# Patient Record
Sex: MALE | Race: WHITE | NOT HISPANIC OR LATINO | Employment: OTHER | URBAN - METROPOLITAN AREA
[De-identification: names, ages, dates, MRNs, and addresses within clinical notes are randomized per-mention and may not be internally consistent; named-entity substitution may affect disease eponyms.]

---

## 2017-04-05 RX ORDER — LEVOTHYROXINE SODIUM 0.05 MG/1
TABLET ORAL
Qty: 30 TABLET | Refills: 3 | Status: SHIPPED | OUTPATIENT
Start: 2017-04-05 | End: 2017-08-01 | Stop reason: SDUPTHER

## 2017-08-01 ENCOUNTER — OFFICE VISIT (OUTPATIENT)
Dept: INTERNAL MEDICINE | Facility: CLINIC | Age: 57
End: 2017-08-01

## 2017-08-01 VITALS
HEART RATE: 57 BPM | BODY MASS INDEX: 28.21 KG/M2 | OXYGEN SATURATION: 98 % | DIASTOLIC BLOOD PRESSURE: 62 MMHG | SYSTOLIC BLOOD PRESSURE: 98 MMHG | WEIGHT: 196.6 LBS

## 2017-08-01 DIAGNOSIS — K21.9 GASTROESOPHAGEAL REFLUX DISEASE, ESOPHAGITIS PRESENCE NOT SPECIFIED: ICD-10-CM

## 2017-08-01 DIAGNOSIS — E03.8 OTHER SPECIFIED HYPOTHYROIDISM: Primary | ICD-10-CM

## 2017-08-01 LAB
T4 FREE SERPL-MCNC: 0.96 NG/DL (ref 0.89–1.76)
TSH SERPL DL<=0.05 MIU/L-ACNC: 3.42 MIU/ML (ref 0.35–5.35)

## 2017-08-01 PROCEDURE — 84439 ASSAY OF FREE THYROXINE: CPT | Performed by: INTERNAL MEDICINE

## 2017-08-01 PROCEDURE — 84443 ASSAY THYROID STIM HORMONE: CPT | Performed by: INTERNAL MEDICINE

## 2017-08-01 PROCEDURE — 99213 OFFICE O/P EST LOW 20 MIN: CPT | Performed by: INTERNAL MEDICINE

## 2017-08-01 RX ORDER — LEVOTHYROXINE SODIUM 0.05 MG/1
50 TABLET ORAL DAILY
Qty: 30 TABLET | Refills: 0 | Status: SHIPPED | OUTPATIENT
Start: 2017-08-01 | End: 2018-01-31 | Stop reason: SDUPTHER

## 2017-08-01 NOTE — PROGRESS NOTES
Hypothyroidism    Subjective   Philippe Mixon is a 57 y.o. male is here today for follow-up.    History of Present Illness   Here for f/u on his hypothyroid nad gerd.  Doing well, denies sxs - wt gain, fatigue or hair loss.  Occ epigastric discomfort.  Takes meds prn only.      Current Outpatient Prescriptions:   •  levothyroxine (SYNTHROID, LEVOTHROID) 50 MCG tablet, Take 1 tablet by mouth Daily., Disp: 30 tablet, Rfl: 0      The following portions of the patient's history were reviewed and updated as appropriate: allergies, current medications, past family history, past medical history, past social history, past surgical history and problem list.    Review of Systems   Constitutional: Negative.  Negative for chills and fever.   HENT: Negative for ear discharge, ear pain, sinus pressure and sore throat.    Respiratory: Negative for cough, chest tightness and shortness of breath.    Cardiovascular: Negative for chest pain, palpitations and leg swelling.   Gastrointestinal: Positive for abdominal pain (epigastric occ.). Negative for diarrhea, nausea and vomiting.   Musculoskeletal: Negative for arthralgias, back pain and myalgias.   Neurological: Negative for dizziness, syncope and headaches.   Psychiatric/Behavioral: Negative for confusion and sleep disturbance.       Objective   BP 98/62  Pulse 57  Wt 196 lb 9.6 oz (89.2 kg)  SpO2 98% Comment: ra  BMI 28.21 kg/m2  Physical Exam   Constitutional: He is oriented to person, place, and time. He appears well-developed and well-nourished.   HENT:   Head: Normocephalic and atraumatic.   Mouth/Throat: No oropharyngeal exudate.   Eyes: Conjunctivae are normal. Pupils are equal, round, and reactive to light.   Neck: Neck supple. No thyromegaly present.   Cardiovascular: Normal rate and regular rhythm.    Pulmonary/Chest: Effort normal and breath sounds normal.   Abdominal: Soft. Bowel sounds are normal. He exhibits no distension. There is no tenderness.    Musculoskeletal: He exhibits no edema.   Neurological: He is alert and oriented to person, place, and time. No cranial nerve deficit.   Skin: Skin is warm and dry.   Psychiatric: He has a normal mood and affect. Judgment normal.   Nursing note and vitals reviewed.        Results for orders placed or performed in visit on 12/13/16   T4, Free   Result Value Ref Range    Free T4 1.12 0.89 - 1.76 ng/dL   TSH   Result Value Ref Range    TSH 4.845 0.350 - 5.350 mIU/mL   Comprehensive Metabolic Panel   Result Value Ref Range    Glucose 84 70 - 100 mg/dL    BUN 15 9 - 23 mg/dL    Creatinine 0.90 0.60 - 1.30 mg/dL    Sodium 139 132 - 146 mmol/L    Potassium 4.2 3.5 - 5.5 mmol/L    Chloride 102 99 - 109 mmol/L    CO2 32.0 (H) 20.0 - 31.0 mmol/L    Calcium 9.9 8.7 - 10.4 mg/dL    Total Protein 7.5 5.7 - 8.2 g/dL    Albumin 4.40 3.20 - 4.80 g/dL    ALT (SGPT) 24 7 - 40 U/L    AST (SGOT) 33 0 - 33 U/L    Alkaline Phosphatase 80 25 - 100 U/L    Total Bilirubin 0.5 0.3 - 1.2 mg/dL    eGFR Non African Amer 88 >60 mL/min/1.73    Globulin 3.1 gm/dL    A/G Ratio 1.4 g/dL    BUN/Creatinine Ratio 16.7 7.0 - 25.0    Anion Gap 5.0 3.0 - 11.0 mmol/L   Lipid Panel   Result Value Ref Range    Total Cholesterol 174 0 - 200 mg/dL    Triglycerides 98 0 - 150 mg/dL    HDL Cholesterol 60 40 - 60 mg/dL    LDL Cholesterol  60 0 - 130 mg/dL   Hepatitis C Antibody   Result Value Ref Range    Hepatitis C Ab Non-Reactive Non-Reactive   PSA   Result Value Ref Range    PSA 1.300 0.000 - 4.000 ng/mL   Vitamin D 25 Hydroxy   Result Value Ref Range    25 Hydroxy, Vitamin D 37.4 ng/ml   CBC (No Diff)   Result Value Ref Range    WBC 7.22 3.50 - 10.80 10*3/mm3    RBC 5.22 4.20 - 5.76 10*6/mm3    Hemoglobin 15.2 13.1 - 17.5 g/dL    Hematocrit 46.5 38.9 - 50.9 %    MCV 89.1 80.0 - 99.0 fL    MCH 29.1 27.0 - 31.0 pg    MCHC 32.7 32.0 - 36.0 g/dL    RDW 12.5 11.3 - 14.5 %    RDW-SD 40.2 37.0 - 54.0 fl    MPV 9.2 6.0 - 12.0 fL    Platelets 255 150 - 450 10*3/mm3    POC Urinalysis Dipstick, Automated   Result Value Ref Range    Color Yellow Yellow, Straw, Dark Yellow, Karena    Clarity, UA Clear Clear    Glucose, UA Negative Negative mg/dL    Bilirubin Negative Negative    Ketones, UA Negative Negative    Specific Gravity  1.015 1.005 - 1.030    Blood, UA Negative Negative    pH, Urine 7.0 5.0 - 8.0    Protein, POC Negative Negative mg/dL    Urobilinogen, UA Normal Normal    Leukocytes Negative Negative    Nitrite, UA Negative Negative             Assessment/Plan   Diagnoses and all orders for this visit:    Other specified hypothyroidism  -     levothyroxine (SYNTHROID, LEVOTHROID) 50 MCG tablet; Take 1 tablet by mouth Daily.  -     TSH  -     T4, Free    Gastroesophageal reflux disease, esophagitis presence not specified  Comments:  stable, continue current regimen.                 Return in about 6 months (around 2/1/2018) for Annual physical.

## 2017-10-01 RX ORDER — LEVOTHYROXINE SODIUM 0.05 MG/1
TABLET ORAL
Qty: 30 TABLET | Refills: 2 | Status: SHIPPED | OUTPATIENT
Start: 2017-10-01 | End: 2017-12-27 | Stop reason: SDUPTHER

## 2017-12-27 RX ORDER — LEVOTHYROXINE SODIUM 0.05 MG/1
TABLET ORAL
Qty: 30 TABLET | Refills: 1 | Status: SHIPPED | OUTPATIENT
Start: 2017-12-27 | End: 2018-01-31 | Stop reason: SDUPTHER

## 2018-01-31 ENCOUNTER — OFFICE VISIT (OUTPATIENT)
Dept: INTERNAL MEDICINE | Facility: CLINIC | Age: 58
End: 2018-01-31

## 2018-01-31 VITALS
HEART RATE: 69 BPM | SYSTOLIC BLOOD PRESSURE: 120 MMHG | WEIGHT: 204.3 LBS | BODY MASS INDEX: 29.25 KG/M2 | HEIGHT: 70 IN | OXYGEN SATURATION: 97 % | DIASTOLIC BLOOD PRESSURE: 80 MMHG

## 2018-01-31 DIAGNOSIS — E03.8 OTHER SPECIFIED HYPOTHYROIDISM: ICD-10-CM

## 2018-01-31 DIAGNOSIS — Z83.3 FAMILY HISTORY OF DIABETES MELLITUS (DM): ICD-10-CM

## 2018-01-31 DIAGNOSIS — N52.9 ERECTILE DYSFUNCTION, UNSPECIFIED ERECTILE DYSFUNCTION TYPE: ICD-10-CM

## 2018-01-31 DIAGNOSIS — B35.1 ONYCHOMYCOSIS: ICD-10-CM

## 2018-01-31 DIAGNOSIS — Z00.00 ANNUAL PHYSICAL EXAM: Primary | ICD-10-CM

## 2018-01-31 LAB
25(OH)D3 SERPL-MCNC: 32.4 NG/ML
ARTICHOKE IGE QN: 62 MG/DL (ref 0–130)
BILIRUB BLD-MCNC: NEGATIVE MG/DL
CHOLEST SERPL-MCNC: 157 MG/DL (ref 0–200)
CLARITY, POC: CLEAR
COLOR UR: YELLOW
DEPRECATED RDW RBC AUTO: 41.2 FL (ref 37–54)
ERYTHROCYTE [DISTWIDTH] IN BLOOD BY AUTOMATED COUNT: 12.8 % (ref 11.3–14.5)
GLUCOSE UR STRIP-MCNC: NEGATIVE MG/DL
HBA1C MFR BLD: 5.6 % (ref 4.8–5.6)
HCT VFR BLD AUTO: 46.7 % (ref 38.9–50.9)
HDLC SERPL-MCNC: 57 MG/DL (ref 40–60)
HGB BLD-MCNC: 15.7 G/DL (ref 13.1–17.5)
KETONES UR QL: NEGATIVE
LEUKOCYTE EST, POC: NEGATIVE
MCH RBC QN AUTO: 29.8 PG (ref 27–31)
MCHC RBC AUTO-ENTMCNC: 33.6 G/DL (ref 32–36)
MCV RBC AUTO: 88.6 FL (ref 80–99)
NITRITE UR-MCNC: NEGATIVE MG/ML
PH UR: 7 [PH] (ref 5–8)
PLATELET # BLD AUTO: 266 10*3/MM3 (ref 150–450)
PMV BLD AUTO: 9.6 FL (ref 6–12)
PROT UR STRIP-MCNC: NEGATIVE MG/DL
PSA SERPL-MCNC: 1.45 NG/ML (ref 0–4)
RBC # BLD AUTO: 5.27 10*6/MM3 (ref 4.2–5.76)
RBC # UR STRIP: NEGATIVE /UL
SP GR UR: 1.01 (ref 1–1.03)
T4 FREE SERPL-MCNC: 1.11 NG/DL (ref 0.89–1.76)
TRIGL SERPL-MCNC: 83 MG/DL (ref 0–150)
TSH SERPL DL<=0.05 MIU/L-ACNC: 5.64 MIU/ML (ref 0.35–5.35)
UROBILINOGEN UR QL: NORMAL
WBC NRBC COR # BLD: 7.26 10*3/MM3 (ref 3.5–10.8)

## 2018-01-31 PROCEDURE — 85027 COMPLETE CBC AUTOMATED: CPT | Performed by: INTERNAL MEDICINE

## 2018-01-31 PROCEDURE — 99396 PREV VISIT EST AGE 40-64: CPT | Performed by: INTERNAL MEDICINE

## 2018-01-31 PROCEDURE — 82270 OCCULT BLOOD FECES: CPT | Performed by: INTERNAL MEDICINE

## 2018-01-31 PROCEDURE — 84439 ASSAY OF FREE THYROXINE: CPT | Performed by: INTERNAL MEDICINE

## 2018-01-31 PROCEDURE — G0103 PSA SCREENING: HCPCS | Performed by: INTERNAL MEDICINE

## 2018-01-31 PROCEDURE — 84443 ASSAY THYROID STIM HORMONE: CPT | Performed by: INTERNAL MEDICINE

## 2018-01-31 PROCEDURE — 83036 HEMOGLOBIN GLYCOSYLATED A1C: CPT | Performed by: INTERNAL MEDICINE

## 2018-01-31 PROCEDURE — 81003 URINALYSIS AUTO W/O SCOPE: CPT | Performed by: INTERNAL MEDICINE

## 2018-01-31 PROCEDURE — 82306 VITAMIN D 25 HYDROXY: CPT | Performed by: INTERNAL MEDICINE

## 2018-01-31 PROCEDURE — 80061 LIPID PANEL: CPT | Performed by: INTERNAL MEDICINE

## 2018-01-31 RX ORDER — LEVOTHYROXINE SODIUM 0.05 MG/1
50 TABLET ORAL DAILY
Qty: 90 TABLET | Refills: 1 | Status: SHIPPED | OUTPATIENT
Start: 2018-01-31 | End: 2018-08-17 | Stop reason: SDUPTHER

## 2018-01-31 RX ORDER — SILDENAFIL 100 MG/1
100 TABLET, FILM COATED ORAL DAILY PRN
Qty: 10 TABLET | Refills: 2 | Status: SHIPPED | OUTPATIENT
Start: 2018-01-31 | End: 2018-08-17

## 2018-01-31 NOTE — PROGRESS NOTES
Chief Complaint   Patient presents with   • Annual Exam       History of Present Illness  HM, Adult Male: The patient is being seen for a health maintenance evaluation. The last health maintenance visit was many year(s) ago.   Social History: Household members include spouse. He is . Work status: working full time. The patient is a former cigarette smoker and quit smoking 1990. Appx 20 pack years.The patient has no concerns about alcohol abuse. He has never used illicit drugs.   General Health: The patient's health is described as fair. He has regular dental visits. He denies vision problems. He denies hearing loss. Immunizations status: not up to date.   Lifestyle:. He consumes a diverse and healthy diet. He does not have any weight concerns. He exercises regularly. Denies tobacco or alcohol use.  Risk: Uptodate on immunization.  Cancer risk ,Screening is UTD, updated.    Feeling very tired. Working 60 + hours , and shift change. 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  Currently getting 5-6 hrs of sleep. occ HA on the rt forehead        Review of Systems   Constitutional: Positive for fatigue. Negative for chills and fever.   HENT: Negative for ear discharge, ear pain, sinus pressure and sore throat.    Eyes: Negative for pain and redness.   Respiratory: Negative for cough, chest tightness and shortness of breath.    Cardiovascular: Negative for chest pain, palpitations and leg swelling.   Gastrointestinal: Positive for abdominal pain (epigastric ). Negative for diarrhea, nausea and vomiting.   Genitourinary: Negative for frequency and urgency.   Musculoskeletal: Negative for arthralgias, back pain and myalgias.   Skin: Negative for wound.   Neurological: Positive for headaches (rt sided). Negative for dizziness and syncope.   Psychiatric/Behavioral: Negative for confusion and sleep disturbance.       Patient Active Problem List   Diagnosis   • Esophageal reflux   • Hyperglycemia   • Hypothyroidism   •  "Vitamin D deficiency   • Duodenitis   • Dyslipidemia   • Family history of prostate cancer in father       Social History     Social History   • Marital status:      Spouse name: N/A   • Number of children: N/A   • Years of education: N/A     Occupational History   • Not on file.     Social History Main Topics   • Smoking status: Former Smoker   • Smokeless tobacco: Not on file   • Alcohol use No   • Drug use: Not on file   • Sexual activity: Not on file     Other Topics Concern   • Not on file     Social History Narrative       Current Outpatient Prescriptions on File Prior to Visit   Medication Sig Dispense Refill   • [DISCONTINUED] levothyroxine (SYNTHROID, LEVOTHROID) 50 MCG tablet Take 1 tablet by mouth Daily. 30 tablet 0   • [DISCONTINUED] levothyroxine (SYNTHROID, LEVOTHROID) 50 MCG tablet TAKE ONE TABLET BY MOUTH DAILY 30 tablet 1     No current facility-administered medications on file prior to visit.        No Known Allergies    /80 (BP Location: Left arm, Patient Position: Sitting, Cuff Size: Adult)  Pulse 69  Ht 177.8 cm (70\")  Wt 92.7 kg (204 lb 4.8 oz)  SpO2 97%  BMI 29.31 kg/m2         The following portions of the patient's history were reviewed and updated as appropriate: allergies, current medications, past family history, past medical history, past social history, past surgical history and problem list.    Physical Exam   Constitutional: He is oriented to person, place, and time.   HENT:   Head: Normocephalic and atraumatic.   Mouth/Throat: No oropharyngeal exudate.   Eyes: Conjunctivae and EOM are normal. Pupils are equal, round, and reactive to light. Right eye exhibits no discharge. Left eye exhibits no discharge. No scleral icterus.   Neck: Normal range of motion. Neck supple. No JVD present. No muscular tenderness present. Carotid bruit is not present. No rigidity. No tracheal deviation and normal range of motion present. No thyroid mass and no thyromegaly present. "   Cardiovascular: Normal rate, regular rhythm, S1 normal, S2 normal, normal heart sounds and intact distal pulses.    No murmur heard.  Pulmonary/Chest: Effort normal and breath sounds normal. No respiratory distress. He has no decreased breath sounds. He has no wheezes. He has no rhonchi. He has no rales.   Abdominal: Soft. Normal appearance and bowel sounds are normal. He exhibits no distension, no ascites and no mass. There is no hepatosplenomegaly. There is no tenderness. There is no rigidity, no rebound and no guarding. No hernia. Hernia confirmed negative in the right inguinal area and confirmed negative in the left inguinal area.   Genitourinary: Rectal exam shows no external hemorrhoid, no internal hemorrhoid, no mass and guaiac negative stool. Prostate is not enlarged and not tender.   Genitourinary Comments: Mildly enlarged prostate, no nodules.   Musculoskeletal:        Right shoulder: Normal.        Left shoulder: Normal.        Right hip: Normal.        Left hip: Normal.        Cervical back: He exhibits no tenderness and no swelling.        Thoracic back: He exhibits normal range of motion, no tenderness and no deformity.        Lumbar back: He exhibits normal range of motion, no tenderness and no deformity.       Vascular Status -  His exam exhibits right foot vasculature normal. His exam exhibits no right foot edema. His exam exhibits left foot vasculature normal. His exam exhibits no left foot edema.  Lymphadenopathy:     He has no cervical adenopathy.     He has no axillary adenopathy. No inguinal adenopathy noted on the right or left side.        Right: No inguinal adenopathy present.        Left: No inguinal adenopathy present.   Neurological: He is alert and oriented to person, place, and time. He has normal strength and normal reflexes. He is not disoriented. No cranial nerve deficit or sensory deficit. He displays no Babinski's sign on the right side. He displays no Babinski's sign on the left  side.   Skin: No bruising, no ecchymosis and no rash noted. He is not diaphoretic.       Results for orders placed or performed in visit on 01/31/18   POC Urinalysis Dipstick, Automated   Result Value Ref Range    Color Yellow Yellow, Straw, Dark Yellow, Karena    Clarity, UA Clear Clear    Glucose, UA Negative Negative, 1000 mg/dL (3+) mg/dL    Bilirubin Negative Negative    Ketones, UA Negative Negative    Specific Gravity  1.010 1.005 - 1.030    Blood, UA Negative Negative    pH, Urine 7.0 5.0 - 8.0    Protein, POC Negative Negative mg/dL    Urobilinogen, UA Normal Normal    Leukocytes Negative Negative    Nitrite, UA Negative Negative       Philippe was seen today for annual exam.    Diagnoses and all orders for this visit:    Annual physical exam  -     POC Urinalysis Dipstick, Automated  -     CBC (No Diff)  -     Hemoglobin A1c  -     Lipid Panel  -     TSH  -     T4, Free  -     Vitamin D 25 Hydroxy  -     PSA Screen    Family history of diabetes mellitus (DM)  -     Hemoglobin A1c    Other specified hypothyroidism  -     levothyroxine (SYNTHROID, LEVOTHROID) 50 MCG tablet; Take 1 tablet by mouth Daily.    Erectile dysfunction, unspecified erectile dysfunction type  -     sildenafil (VIAGRA) 100 MG tablet; Take 1 tablet by mouth Daily As Needed for erectile dysfunction.    Onychomycosis  -     ciclopirox (PENLAC) 8 % solution; Apply  topically Every Night.        Health Maintenance   Topic Date Due   • COLONOSCOPY  05/23/2021   • TDAP/TD VACCINES (2 - Td) 09/14/2025   • HEPATITIS C SCREENING  Completed   • INFLUENZA VACCINE  Addressed       Discussion/Summary  Impression: health maintenance visit, healthy adult male.   Currently, he eats a healthy diet and has an adequate exercise regimen.   Prostate cancer screening: PSA was ordered.   Testicular cancer screening: monthly self testicular exam was advised.   Colorectal cancer screening: fecal occult blood testing is needed every year and colonoscopy .    Screening lab work includes glucose, lipid profile and 25-hydroxyvitamin D.   The immunizations are up to date and Adv. to come in for Zostavax, if insurance covers it.   Advice and education were given regarding cardiovascular risk reduction and self skin examination.   Patient discussion: discussed with the patient.     Return in about 7 months (around 8/31/2018) for Next scheduled follow up.

## 2018-08-15 DIAGNOSIS — E03.8 OTHER SPECIFIED HYPOTHYROIDISM: ICD-10-CM

## 2018-08-16 RX ORDER — LEVOTHYROXINE SODIUM 0.05 MG/1
TABLET ORAL
Qty: 90 TABLET | Refills: 0 | OUTPATIENT
Start: 2018-08-16

## 2018-08-17 ENCOUNTER — OFFICE VISIT (OUTPATIENT)
Dept: INTERNAL MEDICINE | Facility: CLINIC | Age: 58
End: 2018-08-17

## 2018-08-17 VITALS
DIASTOLIC BLOOD PRESSURE: 80 MMHG | BODY MASS INDEX: 29.04 KG/M2 | SYSTOLIC BLOOD PRESSURE: 102 MMHG | HEART RATE: 78 BPM | OXYGEN SATURATION: 98 % | WEIGHT: 202.4 LBS

## 2018-08-17 DIAGNOSIS — E78.5 DYSLIPIDEMIA: ICD-10-CM

## 2018-08-17 DIAGNOSIS — E03.8 OTHER SPECIFIED HYPOTHYROIDISM: Primary | ICD-10-CM

## 2018-08-17 DIAGNOSIS — N52.9 ERECTILE DYSFUNCTION, UNSPECIFIED ERECTILE DYSFUNCTION TYPE: ICD-10-CM

## 2018-08-17 DIAGNOSIS — R73.9 HYPERGLYCEMIA: ICD-10-CM

## 2018-08-17 DIAGNOSIS — E53.8 B12 DEFICIENCY: ICD-10-CM

## 2018-08-17 LAB
HBA1C MFR BLD: 5.7 % (ref 4.8–5.6)
T4 FREE SERPL-MCNC: 1.11 NG/DL (ref 0.89–1.76)
TSH SERPL DL<=0.05 MIU/L-ACNC: 2.81 MIU/ML (ref 0.35–5.35)
VIT B12 BLD-MCNC: 493 PG/ML (ref 211–911)

## 2018-08-17 PROCEDURE — 82607 VITAMIN B-12: CPT | Performed by: INTERNAL MEDICINE

## 2018-08-17 PROCEDURE — 86376 MICROSOMAL ANTIBODY EACH: CPT | Performed by: INTERNAL MEDICINE

## 2018-08-17 PROCEDURE — 84439 ASSAY OF FREE THYROXINE: CPT | Performed by: INTERNAL MEDICINE

## 2018-08-17 PROCEDURE — 99214 OFFICE O/P EST MOD 30 MIN: CPT | Performed by: INTERNAL MEDICINE

## 2018-08-17 PROCEDURE — 84443 ASSAY THYROID STIM HORMONE: CPT | Performed by: INTERNAL MEDICINE

## 2018-08-17 PROCEDURE — 83036 HEMOGLOBIN GLYCOSYLATED A1C: CPT | Performed by: INTERNAL MEDICINE

## 2018-08-17 RX ORDER — LEVOTHYROXINE SODIUM 0.05 MG/1
50 TABLET ORAL DAILY
Qty: 90 TABLET | Refills: 1 | Status: SHIPPED | OUTPATIENT
Start: 2018-08-17 | End: 2018-08-20 | Stop reason: SDUPTHER

## 2018-08-17 RX ORDER — SILDENAFIL CITRATE 20 MG/1
TABLET ORAL
Qty: 30 TABLET | Refills: 5 | Status: SHIPPED | OUTPATIENT
Start: 2018-08-17 | End: 2019-02-22 | Stop reason: SDUPTHER

## 2018-08-17 NOTE — PROGRESS NOTES
Hypothyroidism (Follow up) and Hyperglycemia    Subjective   Philippe Mixon is a 58 y.o. male is here today for follow-up.    History of Present Illness   Philippe is here for a follow up on the hypothyroid, ifg, would like b12 checked.  Also viagra helped on occasion , not all the time.    Current Outpatient Prescriptions:   •  levothyroxine (SYNTHROID, LEVOTHROID) 50 MCG tablet, Take 1 tablet by mouth Daily., Disp: 90 tablet, Rfl: 1  •  sildenafil (REVATIO) 20 MG tablet, Take 2-5 PO daily prn, Disp: 30 tablet, Rfl: 5      The following portions of the patient's history were reviewed and updated as appropriate: allergies, current medications, past family history, past medical history, past social history, past surgical history and problem list.    Review of Systems   Constitutional: Negative.  Negative for chills and fever.   HENT: Negative for ear discharge, ear pain, sinus pressure and sore throat.    Respiratory: Negative for cough, chest tightness and shortness of breath.    Cardiovascular: Negative for chest pain, palpitations and leg swelling.   Gastrointestinal: Negative for diarrhea, nausea and vomiting.   Musculoskeletal: Negative for arthralgias, back pain and myalgias.   Neurological: Negative for dizziness, syncope and headaches.   Psychiatric/Behavioral: Negative for confusion and sleep disturbance.       Objective   /80   Pulse 78   Wt 91.8 kg (202 lb 6.4 oz)   SpO2 98%   BMI 29.04 kg/m²   Physical Exam   Constitutional: He is oriented to person, place, and time. He appears well-developed and well-nourished.   HENT:   Head: Normocephalic and atraumatic.   Mouth/Throat: No oropharyngeal exudate.   Neck: Neck supple. No thyromegaly present.   Cardiovascular: Normal rate and regular rhythm.    Pulmonary/Chest: Effort normal and breath sounds normal.   Abdominal: Soft. Bowel sounds are normal. He exhibits no distension. There is no tenderness.   Musculoskeletal: He exhibits no edema.    Neurological: He is alert and oriented to person, place, and time. No cranial nerve deficit.   Skin: Skin is warm and dry.   Psychiatric: He has a normal mood and affect. Judgment normal.   Nursing note and vitals reviewed.        Results for orders placed or performed in visit on 08/17/18   TSH   Result Value Ref Range    TSH 2.810 0.350 - 5.350 mIU/mL   T4, Free   Result Value Ref Range    Free T4 1.11 0.89 - 1.76 ng/dL   Thyroid Peroxidase Antibody   Result Value Ref Range    Thyroid Peroxidase Antibody 44 (H) 0 - 34 IU/mL   Vitamin B12   Result Value Ref Range    Vitamin B-12 493 211 - 911 pg/mL   Hemoglobin A1c   Result Value Ref Range    Hemoglobin A1C 5.70 (H) 4.80 - 5.60 %             Assessment/Plan   Diagnoses and all orders for this visit:    Other specified hypothyroidism  Comments:  check labs , and if low, increase levothyroxine- to 75mcg.  Orders:  -     levothyroxine (SYNTHROID, LEVOTHROID) 50 MCG tablet; Take 1 tablet by mouth Daily.  -     TSH  -     T4, Free  -     Thyroid Peroxidase Antibody    Dyslipidemia    B12 deficiency  -     Vitamin B12    Erectile dysfunction, unspecified erectile dysfunction type  -     sildenafil (REVATIO) 20 MG tablet; Take 2-5 PO daily prn    Hyperglycemia  -     Hemoglobin A1c                 Return in about 6 months (around 2/17/2019) for Annual.

## 2018-08-18 LAB — THYROPEROXIDASE AB SERPL-ACNC: 44 IU/ML (ref 0–34)

## 2018-08-20 DIAGNOSIS — E03.8 OTHER SPECIFIED HYPOTHYROIDISM: ICD-10-CM

## 2018-08-20 RX ORDER — LEVOTHYROXINE SODIUM 0.07 MG/1
75 TABLET ORAL DAILY
Qty: 30 TABLET | Refills: 5 | Status: SHIPPED | OUTPATIENT
Start: 2018-08-20 | End: 2019-02-22

## 2018-12-11 ENCOUNTER — CLINICAL SUPPORT (OUTPATIENT)
Dept: INTERNAL MEDICINE | Facility: CLINIC | Age: 58
End: 2018-12-11

## 2018-12-11 DIAGNOSIS — Z23 ENCOUNTER FOR VACCINATION: ICD-10-CM

## 2018-12-11 PROCEDURE — 90471 IMMUNIZATION ADMIN: CPT | Performed by: INTERNAL MEDICINE

## 2018-12-11 PROCEDURE — 90632 HEPA VACCINE ADULT IM: CPT | Performed by: INTERNAL MEDICINE

## 2019-02-22 ENCOUNTER — OFFICE VISIT (OUTPATIENT)
Dept: INTERNAL MEDICINE | Facility: CLINIC | Age: 59
End: 2019-02-22

## 2019-02-22 VITALS
BODY MASS INDEX: 27.06 KG/M2 | OXYGEN SATURATION: 96 % | HEART RATE: 64 BPM | DIASTOLIC BLOOD PRESSURE: 85 MMHG | SYSTOLIC BLOOD PRESSURE: 120 MMHG | WEIGHT: 189 LBS | HEIGHT: 70 IN | RESPIRATION RATE: 20 BRPM

## 2019-02-22 DIAGNOSIS — N52.9 ERECTILE DYSFUNCTION, UNSPECIFIED ERECTILE DYSFUNCTION TYPE: ICD-10-CM

## 2019-02-22 DIAGNOSIS — M54.41 CHRONIC RIGHT-SIDED LOW BACK PAIN WITH RIGHT-SIDED SCIATICA: ICD-10-CM

## 2019-02-22 DIAGNOSIS — E03.8 OTHER SPECIFIED HYPOTHYROIDISM: ICD-10-CM

## 2019-02-22 DIAGNOSIS — G89.29 CHRONIC RIGHT-SIDED LOW BACK PAIN WITH RIGHT-SIDED SCIATICA: ICD-10-CM

## 2019-02-22 DIAGNOSIS — Z80.9 FAMILY HISTORY OF CANCER IN BROTHER: ICD-10-CM

## 2019-02-22 DIAGNOSIS — Z80.42 FAMILY HISTORY OF PROSTATE CANCER IN FATHER: ICD-10-CM

## 2019-02-22 DIAGNOSIS — Z00.00 ANNUAL PHYSICAL EXAM: Primary | ICD-10-CM

## 2019-02-22 DIAGNOSIS — R73.01 IFG (IMPAIRED FASTING GLUCOSE): ICD-10-CM

## 2019-02-22 LAB
25(OH)D3 SERPL-MCNC: 51 NG/ML
ALBUMIN SERPL-MCNC: 4.52 G/DL (ref 3.2–4.8)
ALBUMIN/GLOB SERPL: 2 G/DL (ref 1.5–2.5)
ALP SERPL-CCNC: 79 U/L (ref 25–100)
ALT SERPL W P-5'-P-CCNC: 22 U/L (ref 7–40)
ANION GAP SERPL CALCULATED.3IONS-SCNC: 7 MMOL/L (ref 3–11)
ARTICHOKE IGE QN: 44 MG/DL (ref 0–130)
AST SERPL-CCNC: 23 U/L (ref 0–33)
BILIRUB SERPL-MCNC: 0.8 MG/DL (ref 0.3–1.2)
BUN BLD-MCNC: 14 MG/DL (ref 9–23)
BUN/CREAT SERPL: 13.3 (ref 7–25)
CALCIUM SPEC-SCNC: 9.7 MG/DL (ref 8.7–10.4)
CHLORIDE SERPL-SCNC: 103 MMOL/L (ref 99–109)
CHOLEST SERPL-MCNC: 150 MG/DL (ref 0–200)
CO2 SERPL-SCNC: 31 MMOL/L (ref 20–31)
CREAT BLD-MCNC: 1.05 MG/DL (ref 0.6–1.3)
DEPRECATED RDW RBC AUTO: 42.5 FL (ref 37–54)
ERYTHROCYTE [DISTWIDTH] IN BLOOD BY AUTOMATED COUNT: 12.9 % (ref 11.3–14.5)
GFR SERPL CREATININE-BSD FRML MDRD: 73 ML/MIN/1.73
GLOBULIN UR ELPH-MCNC: 2.3 GM/DL
GLUCOSE BLD-MCNC: 79 MG/DL (ref 70–100)
HBA1C MFR BLD: 5.2 % (ref 4.8–5.6)
HCT VFR BLD AUTO: 48.6 % (ref 38.9–50.9)
HDLC SERPL-MCNC: 62 MG/DL (ref 40–60)
HGB BLD-MCNC: 16 G/DL (ref 13.1–17.5)
MCH RBC QN AUTO: 29.9 PG (ref 27–31)
MCHC RBC AUTO-ENTMCNC: 32.9 G/DL (ref 32–36)
MCV RBC AUTO: 90.8 FL (ref 80–99)
PLATELET # BLD AUTO: 281 10*3/MM3 (ref 150–450)
PMV BLD AUTO: 9.7 FL (ref 6–12)
POTASSIUM BLD-SCNC: 4.1 MMOL/L (ref 3.5–5.5)
PROT SERPL-MCNC: 6.8 G/DL (ref 5.7–8.2)
PSA SERPL-MCNC: 1.68 NG/ML (ref 0–4)
RBC # BLD AUTO: 5.35 10*6/MM3 (ref 4.2–5.76)
SODIUM BLD-SCNC: 141 MMOL/L (ref 132–146)
TRIGL SERPL-MCNC: 104 MG/DL (ref 0–150)
TSH SERPL DL<=0.05 MIU/L-ACNC: 3.76 MIU/ML (ref 0.35–5.35)
WBC NRBC COR # BLD: 7.13 10*3/MM3 (ref 3.5–10.8)

## 2019-02-22 PROCEDURE — 99396 PREV VISIT EST AGE 40-64: CPT | Performed by: INTERNAL MEDICINE

## 2019-02-22 PROCEDURE — 84443 ASSAY THYROID STIM HORMONE: CPT | Performed by: INTERNAL MEDICINE

## 2019-02-22 PROCEDURE — 83036 HEMOGLOBIN GLYCOSYLATED A1C: CPT | Performed by: INTERNAL MEDICINE

## 2019-02-22 PROCEDURE — G0103 PSA SCREENING: HCPCS | Performed by: INTERNAL MEDICINE

## 2019-02-22 PROCEDURE — 80053 COMPREHEN METABOLIC PANEL: CPT | Performed by: INTERNAL MEDICINE

## 2019-02-22 PROCEDURE — 82306 VITAMIN D 25 HYDROXY: CPT | Performed by: INTERNAL MEDICINE

## 2019-02-22 PROCEDURE — 85027 COMPLETE CBC AUTOMATED: CPT | Performed by: INTERNAL MEDICINE

## 2019-02-22 PROCEDURE — 80061 LIPID PANEL: CPT | Performed by: INTERNAL MEDICINE

## 2019-02-22 PROCEDURE — 99213 OFFICE O/P EST LOW 20 MIN: CPT | Performed by: INTERNAL MEDICINE

## 2019-02-22 RX ORDER — SILDENAFIL CITRATE 20 MG/1
TABLET ORAL
Qty: 30 TABLET | Refills: 5 | Status: SHIPPED | OUTPATIENT
Start: 2019-02-22 | End: 2019-08-23 | Stop reason: SDUPTHER

## 2019-02-22 RX ORDER — LEVOTHYROXINE SODIUM 0.07 MG/1
75 TABLET ORAL DAILY
Qty: 90 TABLET | Refills: 1 | Status: SHIPPED | OUTPATIENT
Start: 2019-02-22 | End: 2019-07-23 | Stop reason: SDUPTHER

## 2019-02-22 NOTE — PROGRESS NOTES
Chief Complaint   Patient presents with   • Hypothyroidism   • Annual Exam       History of Present Illness  HM, Adult Male: The patient is being seen for a health maintenance evaluation. The last health maintenance visit was many year(s) ago.   Social History: Household members include spouse. He is . Work status: working full time. The patient is a former cigarette smoker and quit smoking - ( 1- 1.5 packs for 10- 15 years). Appx 20 pack years.The patient has no concerns about alcohol abuse. He has never used illicit drugs.   General Health: The patient's health is described as fair. He has regular dental visits. He denies vision problems. He denies hearing loss. Immunizations status: not up to date.   Lifestyle:. He consumes a diverse and healthy diet. He does not have any weight concerns. He exercises regularly. Denies tobacco or alcohol use.  Risk: Uptodate on immunization.  Cancer risk ,Screening is UTD, updated. .     Has been doing better- exercise and diet wise.   Dad had prostate ca and brother  of lung cancer , would like genetic testing.  Also his epigastric pain comes on and off, goes away on its own.  Rt lower back pain radiates to his thigh and leg Has had it x 2-3 years, thinks may be getting worse.   Thinks has had PT several years ago.    Review of Systems   Constitutional: Negative.  Negative for chills and fever.   HENT: Negative for ear discharge, ear pain, sinus pressure and sore throat.    Eyes: Negative for pain and redness.   Respiratory: Negative for cough, chest tightness and shortness of breath.    Cardiovascular: Negative for chest pain, palpitations and leg swelling.   Gastrointestinal: Positive for abdominal pain. Negative for diarrhea, nausea and vomiting.   Endocrine: Negative for polydipsia and polyphagia.   Genitourinary: Negative for frequency and urgency.   Musculoskeletal: Positive for arthralgias, back pain and neck pain. Negative for myalgias.   Skin: Negative  "for pallor and rash.   Neurological: Positive for numbness. Negative for dizziness, syncope and headaches.   Psychiatric/Behavioral: Negative for confusion and sleep disturbance.       Patient Active Problem List   Diagnosis   • Esophageal reflux   • Hyperglycemia   • Hypothyroidism   • Vitamin D deficiency   • Duodenitis   • Dyslipidemia   • Family history of prostate cancer in father       Social History     Socioeconomic History   • Marital status:      Spouse name: Not on file   • Number of children: Not on file   • Years of education: Not on file   • Highest education level: Not on file   Social Needs   • Financial resource strain: Not on file   • Food insecurity - worry: Not on file   • Food insecurity - inability: Not on file   • Transportation needs - medical: Not on file   • Transportation needs - non-medical: Not on file   Occupational History   • Not on file   Tobacco Use   • Smoking status: Former Smoker   Substance and Sexual Activity   • Alcohol use: No   • Drug use: Not on file   • Sexual activity: Not on file   Other Topics Concern   • Not on file   Social History Narrative   • Not on file       No current outpatient medications on file prior to visit.     No current facility-administered medications on file prior to visit.        No Known Allergies    /85   Pulse 64   Resp 20   Ht 177.8 cm (70\")   Wt 85.7 kg (189 lb)   SpO2 96%   BMI 27.12 kg/m²          The following portions of the patient's history were reviewed and updated as appropriate: allergies, current medications, past family history, past medical history, past social history, past surgical history and problem list.    Physical Exam   Constitutional: He is oriented to person, place, and time. He appears well-developed and well-nourished.   HENT:   Head: Normocephalic and atraumatic.   Right Ear: External ear normal.   Left Ear: External ear normal.   Mouth/Throat: No oropharyngeal exudate.   Eyes: Conjunctivae are " normal. Pupils are equal, round, and reactive to light.   Neck: Neck supple. No thyromegaly present.   Cardiovascular: Normal rate, regular rhythm and intact distal pulses. Exam reveals no gallop and no friction rub.   Pulmonary/Chest: Effort normal and breath sounds normal. No stridor. He has no wheezes.   Abdominal: Soft. Bowel sounds are normal. He exhibits no distension. There is no tenderness.   Musculoskeletal: He exhibits tenderness. He exhibits no edema.   Neurological: He is alert and oriented to person, place, and time. No cranial nerve deficit.   Skin: Skin is warm and dry.   Psychiatric: He has a normal mood and affect. Judgment normal.   Nursing note and vitals reviewed.      Results for orders placed or performed in visit on 02/22/19   CBC (No Diff)   Result Value Ref Range    WBC 7.13 3.50 - 10.80 10*3/mm3    RBC 5.35 4.20 - 5.76 10*6/mm3    Hemoglobin 16.0 13.1 - 17.5 g/dL    Hematocrit 48.6 38.9 - 50.9 %    MCV 90.8 80.0 - 99.0 fL    MCH 29.9 27.0 - 31.0 pg    MCHC 32.9 32.0 - 36.0 g/dL    RDW 12.9 11.3 - 14.5 %    RDW-SD 42.5 37.0 - 54.0 fl    MPV 9.7 6.0 - 12.0 fL    Platelets 281 150 - 450 10*3/mm3   Comprehensive Metabolic Panel   Result Value Ref Range    Glucose 79 70 - 100 mg/dL    BUN 14 9 - 23 mg/dL    Creatinine 1.05 0.60 - 1.30 mg/dL    Sodium 141 132 - 146 mmol/L    Potassium 4.1 3.5 - 5.5 mmol/L    Chloride 103 99 - 109 mmol/L    CO2 31.0 20.0 - 31.0 mmol/L    Calcium 9.7 8.7 - 10.4 mg/dL    Total Protein 6.8 5.7 - 8.2 g/dL    Albumin 4.52 3.20 - 4.80 g/dL    ALT (SGPT) 22 7 - 40 U/L    AST (SGOT) 23 0 - 33 U/L    Alkaline Phosphatase 79 25 - 100 U/L    Total Bilirubin 0.8 0.3 - 1.2 mg/dL    eGFR Non African Amer 73 >60 mL/min/1.73    Globulin 2.3 gm/dL    A/G Ratio 2.0 1.5 - 2.5 g/dL    BUN/Creatinine Ratio 13.3 7.0 - 25.0    Anion Gap 7.0 3.0 - 11.0 mmol/L   Lipid Panel   Result Value Ref Range    Total Cholesterol 150 0 - 200 mg/dL    Triglycerides 104 0 - 150 mg/dL    HDL  Cholesterol 62 (H) 40 - 60 mg/dL    LDL Cholesterol  44 0 - 130 mg/dL   TSH   Result Value Ref Range    TSH 3.757 0.350 - 5.350 mIU/mL   Vitamin D 25 Hydroxy   Result Value Ref Range    25 Hydroxy, Vitamin D 51.0 ng/ml   PSA Screen   Result Value Ref Range    PSA 1.680 0.000 - 4.000 ng/mL   Hemoglobin A1c   Result Value Ref Range    Hemoglobin A1C 5.20 4.80 - 5.60 %       Philippe was seen today for hypothyroidism and annual exam.    Diagnoses and all orders for this visit:    Annual physical exam  -     CBC (No Diff)  -     Comprehensive Metabolic Panel  -     Lipid Panel  -     TSH  -     Vitamin D 25 Hydroxy  -     PSA Screen  -     Hemoglobin A1c    Other specified hypothyroidism  Comments:  stable, continue current regimen.  Orders:  -     levothyroxine (SYNTHROID, LEVOTHROID) 75 MCG tablet; Take 1 tablet by mouth Daily.    Erectile dysfunction, unspecified erectile dysfunction type  -     sildenafil (REVATIO) 20 MG tablet; Take 2-5 PO daily prn    Family history of prostate cancer in father  -     PSA Screen  -     Ambulatory Referral to Genetic Counseling ([facility])    Family history of cancer in brother  -     Ambulatory Referral to Genetic Counseling ([facility])    Chronic right-sided low back pain with right-sided sciatica  -     Ambulatory Referral to Physical Therapy Evaluate and treat    IFG (impaired fasting glucose)  -     Hemoglobin A1c        Health Maintenance   Topic Date Due   • ZOSTER VACCINE (1 of 2) 04/16/2011   • ANNUAL PHYSICAL  02/23/2020   • COLONOSCOPY  05/23/2021   • TDAP/TD VACCINES (2 - Td) 09/14/2025   • HEPATITIS C SCREENING  Completed   • INFLUENZA VACCINE  Addressed       Discussion/Summary  Impression: health maintenance visit, healthy adult male.   Currently, he eats a healthy diet and has an adequate exercise regimen.   Prostate cancer screening: PSA was ordered.   Testicular cancer screening: monthly self testicular exam was advised.   Colorectal cancer screening: fecal occult  blood testing is needed every year and colonoscopy .   Screening lab work includes glucose, lipid profile and 25-hydroxyvitamin D.   The immunizations are up to date.   Advice and education were given regarding cardiovascular risk reduction, healthy dietary habits, Seatbelt and helmet use and self skin examination.        Return in about 6 months (around 8/22/2019) for Next scheduled follow up, and physical in 1 year.

## 2019-03-04 ENCOUNTER — TELEPHONE (OUTPATIENT)
Dept: GENETICS | Facility: HOSPITAL | Age: 59
End: 2019-03-04

## 2019-05-10 ENCOUNTER — APPOINTMENT (OUTPATIENT)
Dept: LAB | Facility: HOSPITAL | Age: 59
End: 2019-05-10

## 2019-05-10 ENCOUNTER — CLINICAL SUPPORT (OUTPATIENT)
Dept: GENETICS | Facility: HOSPITAL | Age: 59
End: 2019-05-10

## 2019-05-10 DIAGNOSIS — Z80.1 FAMILY HISTORY OF LUNG CANCER: ICD-10-CM

## 2019-05-10 DIAGNOSIS — Z80.8 FAMILY HISTORY OF THYROID CANCER: ICD-10-CM

## 2019-05-10 DIAGNOSIS — Z80.42 FAMILY HISTORY OF PROSTATE CANCER IN FATHER: Primary | ICD-10-CM

## 2019-05-10 DIAGNOSIS — Z80.6 FAMILY HISTORY OF LEUKEMIA: ICD-10-CM

## 2019-05-10 DIAGNOSIS — Z13.79 GENETIC TESTING: Primary | ICD-10-CM

## 2019-05-10 PROCEDURE — 96040: CPT | Performed by: GENETIC COUNSELOR, MS

## 2019-05-10 NOTE — PROGRESS NOTES
Philippe Mixon is a 59-year-old male who was seen for genetic counseling due to a family history of metastatic prostate cancer.  Mr. Mixon has no personal history of cancer. Mr. Mixon was interested in discussing his risk for a hereditary cancer syndrome and genetic testing recommendations.  A multi-gene panel was ordered, the CancerNext panel through [employer] which analyzes 34 genes associated with an increased cancer risk. The genes on this panel include APC, RON, BARD1, BMPR1A, BRCA1, BRCA2, BRIP1, CDH1, CDK4, CDKN2A, CHEK2, DICER1, EPCAM, GREM1, HOXB13, MLH1, MRE11A, MSH2, MSH6, MUTYH, NBN, NF1, PALB2, PMS2, POLD1, POLE, PTEN, RAD50, RAD51C, RAD51D, SMAD4, SMARCA4, STK11, and TP53. Results from this testing are expected in approximately 2-3 weeks.    FAMILY HISTORY (see attached pedigree):    Father:  Prostate cancer (metastatic to bone), 70  Brother: Lung cancer, 48  Daughter: Thyroid cancer, 35; Leukemia (reportedly due to thyroid cancer treatment), 36  Mat. Aunt: Possible bladder cancer    We do not have medical records confirming the diagnoses in Mr. Mixon’s family.    RISK ASSESSMENT:  Mr. Mixon’s family history of cancer led to concern regarding a hereditary cancer syndrome. The most recent version of the NCCN guidelines (updated October 2017) for BRCA1/2 testing has been updated to include testing due to a family history of metastatic prostate cancer in a first- or second-degree relative. Therefore, Mr. Mixon meets this new criterion for BRCA1/2 testing based on his family history. We discussed the availability of multigene panels that evaluate BRCA1/2 and additional genes associated with increased cancer risk. Mr. Mixon opted to pursue multigene panel testing via the CancerNext panel. These risk assessments are based on the family history information provided at the time of the appointment and could change in the future should new information be obtained.    GENETIC COUNSELING (30  minutes):  We reviewed the family history information in detail. Cases of cancer follow three general patterns: sporadic, familial, and hereditary.  While most breast cancer is sporadic, some cases appear to occur in family clusters.  These cases are said to be familial and account for 10-20% of cancer cases.  Familial cases may be due to a combination of shared genes and environmental factors among family members.  In even fewer families, the cancer is said to be inherited, and the genes responsible for the cancer are known.      Family histories typical of hereditary cancer syndromes usually include multiple first- and second-degree relatives diagnosed with cancer types that define a syndrome.  These cases tend to be diagnosed at younger-than-expected ages and can be bilateral or multifocal.  The cancer in these families follows an autosomal dominant inheritance pattern, which indicates the likely presence of a mutation in a cancer susceptibility gene.  Children and siblings of an individual believed to carry this mutation have a 50% chance of inheriting that mutation, thereby inheriting the increased risk to develop cancer.  These mutations can be passed down from the maternal or the paternal lineage.    Based on Mr. Mixon’s family history, we discussed that hereditary breast cancer accounts for 5-10% of all cases of breast cancer.  A significant proportion of hereditary breast cancer can be attributed to mutations in the BRCA1 and BRCA2 genes.  Mutations in these genes confer an increased risk for breast cancer, ovarian cancer, male breast cancer, prostate cancer, and pancreatic cancer.  Women with a BRCA1 or BRCA2 mutation have up to an 87% lifetime risk of breast cancer and up to a 44% risk of ovarian cancer.     We discussed that there are other, more rare, hereditary cancer syndromes. Some of these conditions have well defined cancer risks and established management guidelines.  Other genes that can be  tested for have been more recently described, and there may be less data regarding the risks and therefore may not have established management guidelines.  We discussed these limitations at length. Based on Mr. Mixon’s family history of cancer and his desire to get more information regarding his personal risks and potential risks for his family, he opted to pursue testing through a panel evaluating several other genes known to increase the risk for cancer.    GENETIC TESTING:  The risks, benefits and limitations of genetic testing and implications for clinical management following testing were reviewed. DNA test results can influence decisions regarding screening and prevention.  Genetic testing can have significant psychological implications for both individuals and families. Also discussed was the possibility of employment and insurance discrimination based on genetic test results and the federal and states laws that are in place to prevent this (HALI).         We discussed panel testing, which would involve testing 34 genes associated with increased cancer risk. The benefits and limitations of genetic testing were discussed.  The implications of a positive or negative test result were discussed.  We also discussed the importance of testing on an affected relative. We discussed the possibility that, in some cases, genetic test results may be ambiguous due to the identification of a genetic variant. These variants may or may not be associated with an increased cancer risk. With multigene panel testing, it is not uncommon for a variant of uncertain significance (VUS) to be identified.  If a VUS is identified, testing family members is not recommended and screening recommendations are made based on the family history.  The laboratories that perform genetic testing work to reclassify the VUS and send out an amended report if and when a VUS is reclassified.  The majority of variant findings are ultimately  reclassified to a negative result. Given his family history, a negative test result does not eliminate all cancer risk, although the risk would not be as high as it would with positive genetic testing. We also discussed that some of the genes on this particular panel have not been well studied yet and there may not be clear implications or guidelines for some of the genes included on this comprehensive panel.    PLAN:  Genetic testing via the CancerNext panel through [employer] was ordered and results are expected in 2-3 weeks. We will contact Mr. Mixon with his results once they are received.      Suzette Chowdhury MS, [facility], [facility]  Licensed Certified Genetic Counselor

## 2019-06-04 ENCOUNTER — DOCUMENTATION (OUTPATIENT)
Dept: GENETICS | Facility: HOSPITAL | Age: 59
End: 2019-06-04

## 2019-06-04 NOTE — PROGRESS NOTES
Philippe Mixon is a 59-year-old male who was seen for genetic counseling due to a family history of metastatic prostate cancer.  Mr. Mixon has no personal history of cancer. He has colonoscopies every five years, and he reports having had a colon polyp removed. Mr. Mixon was interested in discussing his risk for a hereditary cancer syndrome and genetic testing recommendations.  A multi-gene panel was ordered, the CancerNext panel through [employer] which analyzes 34 genes associated with an increased cancer risk. The genes on this panel include APC, RON, BARD1, BMPR1A, BRCA1, BRCA2, BRIP1, CDH1, CDK4, CDKN2A, CHEK2, DICER1, EPCAM, GREM1, HOXB13, MLH1, MRE11A, MSH2, MSH6, MUTYH, NBN, NF1, PALB2, PMS2, POLD1, POLE, PTEN, RAD50, RAD51C, RAD51D, SMAD4, SMARCA4, STK11, and TP53. Genetic testing was negative for pathogenic mutations in BRCA1/2 and 32 additional genes on this panel. These normal results were discussed with Mr. Mixon by telephone on 6/4/19.    FAMILY HISTORY (see attached pedigree):    Father:  Prostate cancer (metastatic to bone), 70  Brother: Lung cancer, 48  Daughter: Thyroid cancer, 35; Leukemia, 36 (reported to be secondary to thyroid cancer treatment)  Mat. Aunt: Possible bladder cancer, 60s    We do not have medical records regarding the diagnoses in Mr. Mixon’s family.    RISK ASSESSMENT:  Mr. Mixon’s family history of cancer led to concern regarding a hereditary cancer syndrome. The NCCN guidelines (updated October 2017) for BRCA1/2 testing has been updated to include testing due to a family history of metastatic prostate cancer in a first- or second-degree relative. Therefore, Mr. Mixon meets this new criterion for BRCA1/2 testing based on his family history. We discussed the availability of multigene panels that evaluate BRCA1/2 and additional genes associated with increased cancer risk. Mr. Mixon opted to pursue multigene panel testing via the CancerNext panel. These risk  assessments are based on the family history information provided at the time of the appointment and could change in the future should new information be obtained.    GENETIC COUNSELING:  We reviewed the family history information in detail. Cases of cancer follow three general patterns: sporadic, familial, and hereditary.  While most cancer is sporadic, some cases appear to occur in family clusters.  These cases are said to be familial and account for 10-20% of cancer cases.  Familial cases may be due to a combination of shared genes and environmental factors among family members.  In even fewer families, the cancer is said to be inherited, and the genes responsible for the cancer are known.      Family histories typical of hereditary cancer syndromes usually include multiple first- and second-degree relatives diagnosed with cancer types that define a syndrome.  These cases tend to be diagnosed at younger-than-expected ages and can be bilateral or multifocal.  The cancer in these families follows an autosomal dominant inheritance pattern, which indicates the likely presence of a mutation in a cancer susceptibility gene.  Children and siblings of an individual believed to carry this mutation have a 50% chance of inheriting that mutation, thereby inheriting the increased risk to develop cancer.  These mutations can be passed down from the maternal or the paternal lineage.    Based on Mr. Mixon’s family history, we discussed that prostate cancer has been associated with the BRCA1/2 genes. A significant proportion of hereditary breast cancer can be attributed to mutations in the BRCA1 and BRCA2 genes.  Mutations in these genes confer an increased risk for breast cancer, ovarian cancer, male breast cancer, prostate cancer, and pancreatic cancer.  Women with a BRCA1 or BRCA2 mutation have up to an 87% lifetime risk of breast cancer and up to a 44% risk of ovarian cancer. Men with a BRCA1 or BRCA2 mutation have up to a  20% risk of prostate cancer and 7% risk of male breast cancer.     We discussed that there are other, more rare, hereditary cancer syndromes. Some of these conditions have well defined cancer risks and established management guidelines.  Other genes that can be tested for have been more recently described, and there may be less data regarding the risks and therefore may not have established management guidelines.  We discussed these limitations at length. Based on Mr. Mixon’s family history of cancer and his desire to get more information regarding his personal risks and potential risks for his family, he opted to pursue testing through a panel evaluating several other genes known to increase the risk for cancer.    GENETIC TESTING:  The risks, benefits and limitations of genetic testing and implications for clinical management following testing were reviewed. DNA test results can influence decisions regarding screening and prevention.  Genetic testing can have significant psychological implications for both individuals and families. Also discussed was the possibility of employment and insurance discrimination based on genetic test results and the federal and states laws that are in place to prevent this (HALI).         We discussed panel testing, which would involve testing 34 genes associated with increased cancer risk. The benefits and limitations of genetic testing were discussed.  The implications of a positive or negative test result were discussed.  We also discussed the importance of testing on an affected relative. We discussed the possibility that, in some cases, genetic test results may be ambiguous due to the identification of a genetic variant. These variants may or may not be associated with an increased cancer risk. With multigene panel testing, it is not uncommon for a variant of uncertain significance (VUS) to be identified.  If a VUS is identified, testing family members is not recommended and  screening recommendations are made based on the family history.  The laboratories that perform genetic testing work to reclassify the VUS and send out an amended report if and when a VUS is reclassified.  The majority of variant findings are ultimately reclassified to a negative result. Given his family history, a negative test result does not eliminate all cancer risk, although the risk would not be as high as it would with positive genetic testing. We also discussed that some of the genes on this particular panel have not been well studied yet and there may not be clear implications or guidelines for some of the genes included on this comprehensive panel.    TEST RESULTS: Genetic testing was negative for known pathogenic mutations by sequencing and rearrangement testing for the 34 genes on the CancerNext panel.  This negative result greatly lowers, but does not eliminate, the risk of a hereditary cancer syndrome for Mr. Mixon. It is possible that the family history is due to a hereditary cancer syndrome that Mr. Mixon did not happen to inherit. If a relative of his were to have testing and was found to carry a mutation in a gene included on this panel, Mr. Mixon’s risk assessment would need to be updated. This assessment is based on the information provided at the time of the consultation.    PLAN:  Genetic counseling remains available to Mr. Mixon and his family. If Mr. Mixon has any questions or concerns, he is welcome to contact us at [phone].      Suzette Chowdhury MS, [facility], [facility]  Licensed Certified Genetic Counselor       Cc: Philippe Livingston MD

## 2019-06-12 ENCOUNTER — CLINICAL SUPPORT (OUTPATIENT)
Dept: INTERNAL MEDICINE | Facility: CLINIC | Age: 59
End: 2019-06-12

## 2019-06-12 DIAGNOSIS — Z23 ENCOUNTER FOR ADMINISTRATION OF VACCINE: Primary | ICD-10-CM

## 2019-06-12 PROCEDURE — 90471 IMMUNIZATION ADMIN: CPT | Performed by: INTERNAL MEDICINE

## 2019-06-12 PROCEDURE — 90632 HEPA VACCINE ADULT IM: CPT | Performed by: INTERNAL MEDICINE

## 2019-07-23 DIAGNOSIS — E03.8 OTHER SPECIFIED HYPOTHYROIDISM: ICD-10-CM

## 2019-07-23 RX ORDER — LEVOTHYROXINE SODIUM 0.07 MG/1
TABLET ORAL
Qty: 90 TABLET | Refills: 0 | Status: SHIPPED | OUTPATIENT
Start: 2019-07-23 | End: 2019-08-20 | Stop reason: SDUPTHER

## 2019-08-20 DIAGNOSIS — E03.8 OTHER SPECIFIED HYPOTHYROIDISM: ICD-10-CM

## 2019-08-20 RX ORDER — LEVOTHYROXINE SODIUM 0.07 MG/1
TABLET ORAL
Qty: 90 TABLET | Refills: 0 | Status: SHIPPED | OUTPATIENT
Start: 2019-08-20 | End: 2019-08-23 | Stop reason: SDUPTHER

## 2019-08-23 ENCOUNTER — OFFICE VISIT (OUTPATIENT)
Dept: INTERNAL MEDICINE | Facility: CLINIC | Age: 59
End: 2019-08-23

## 2019-08-23 VITALS
BODY MASS INDEX: 27.2 KG/M2 | DIASTOLIC BLOOD PRESSURE: 62 MMHG | HEIGHT: 70 IN | HEART RATE: 62 BPM | WEIGHT: 190 LBS | SYSTOLIC BLOOD PRESSURE: 100 MMHG | OXYGEN SATURATION: 98 %

## 2019-08-23 DIAGNOSIS — G89.29 CHRONIC PAIN OF MULTIPLE JOINTS: Primary | ICD-10-CM

## 2019-08-23 DIAGNOSIS — R73.01 IFG (IMPAIRED FASTING GLUCOSE): ICD-10-CM

## 2019-08-23 DIAGNOSIS — N52.9 ERECTILE DYSFUNCTION, UNSPECIFIED ERECTILE DYSFUNCTION TYPE: ICD-10-CM

## 2019-08-23 DIAGNOSIS — E03.8 OTHER SPECIFIED HYPOTHYROIDISM: ICD-10-CM

## 2019-08-23 DIAGNOSIS — M25.50 CHRONIC PAIN OF MULTIPLE JOINTS: Primary | ICD-10-CM

## 2019-08-23 LAB
HBA1C MFR BLD: 5.44 % (ref 4.8–5.6)
T4 FREE SERPL-MCNC: 1.26 NG/DL (ref 0.93–1.7)
TSH SERPL DL<=0.05 MIU/L-ACNC: 3.2 MIU/ML (ref 0.27–4.2)

## 2019-08-23 PROCEDURE — 84443 ASSAY THYROID STIM HORMONE: CPT | Performed by: INTERNAL MEDICINE

## 2019-08-23 PROCEDURE — 83036 HEMOGLOBIN GLYCOSYLATED A1C: CPT | Performed by: INTERNAL MEDICINE

## 2019-08-23 PROCEDURE — 84439 ASSAY OF FREE THYROXINE: CPT | Performed by: INTERNAL MEDICINE

## 2019-08-23 PROCEDURE — 99213 OFFICE O/P EST LOW 20 MIN: CPT | Performed by: INTERNAL MEDICINE

## 2019-08-23 RX ORDER — LEVOTHYROXINE SODIUM 0.07 MG/1
75 TABLET ORAL DAILY
Qty: 90 TABLET | Refills: 1 | Status: SHIPPED | OUTPATIENT
Start: 2019-08-23 | End: 2020-03-04 | Stop reason: SDUPTHER

## 2019-08-23 RX ORDER — SILDENAFIL CITRATE 20 MG/1
TABLET ORAL
Qty: 30 TABLET | Refills: 5 | Status: SHIPPED | OUTPATIENT
Start: 2019-08-23 | End: 2020-03-04 | Stop reason: SDUPTHER

## 2019-08-23 RX ORDER — CELECOXIB 200 MG/1
CAPSULE ORAL
Qty: 30 CAPSULE | Refills: 1 | Status: SHIPPED | OUTPATIENT
Start: 2019-08-23 | End: 2019-11-13 | Stop reason: SDUPTHER

## 2019-08-23 NOTE — PROGRESS NOTES
"6 MONTH FU (hand weakness and pain, right knee pain, bilateral foot pain) and Hypothyroidism    Subjective   Philippe Mixon is a 59 y.o. male is here today for follow-up.    History of Present Illness   Doing better, thinks more wear and tear, some trouble with  ad some pain in hands.  S/p surgery on his shoulders. Some issues- going through workmans comp.  Knee pain on the medial aspect.  His back issues are persisiting. Never got called for PT by [facility].  Eventually he again strained it at work, therefore getting PT through them.    Current Outpatient Medications:   •  levothyroxine (SYNTHROID, LEVOTHROID) 75 MCG tablet, Take 1 tablet by mouth Daily., Disp: 90 tablet, Rfl: 1  •  sildenafil (REVATIO) 20 MG tablet, Take 2-5 PO daily prn, Disp: 30 tablet, Rfl: 5  •  celecoxib (CELEBREX) 200 MG capsule, Take 1 po daily x 10 days then take prn., Disp: 30 capsule, Rfl: 1      The following portions of the patient's history were reviewed and updated as appropriate: allergies, current medications, past family history, past medical history, past social history, past surgical history and problem list.    Review of Systems   Constitutional: Negative.  Negative for chills and fever.   HENT: Negative for ear discharge, ear pain, sinus pressure and sore throat.    Respiratory: Negative for cough, chest tightness and shortness of breath.    Cardiovascular: Negative for chest pain, palpitations and leg swelling.   Gastrointestinal: Negative for diarrhea, nausea and vomiting.   Musculoskeletal: Positive for arthralgias, back pain and joint swelling. Negative for myalgias.   Neurological: Negative for dizziness, syncope and headaches.   Psychiatric/Behavioral: Negative for confusion and sleep disturbance.       Objective   /62   Pulse 62   Ht 177.8 cm (70\")   Wt 86.2 kg (190 lb)   SpO2 98% Comment: ra  BMI 27.26 kg/m²   Physical Exam   Constitutional: He is oriented to person, place, and time. He appears " well-developed and well-nourished.   HENT:   Head: Normocephalic and atraumatic.   Right Ear: External ear normal.   Left Ear: External ear normal.   Mouth/Throat: No oropharyngeal exudate.   Eyes: Conjunctivae are normal. Pupils are equal, round, and reactive to light.   Neck: Neck supple. No thyromegaly present.   Cardiovascular: Normal rate and regular rhythm.   Pulmonary/Chest: Effort normal and breath sounds normal.   Abdominal: Soft. Bowel sounds are normal. He exhibits no distension. There is no tenderness.   Musculoskeletal: He exhibits edema, tenderness and deformity (swelling hand ).   Neurological: He is alert and oriented to person, place, and time. No cranial nerve deficit.   Skin: Skin is warm and dry.   Psychiatric: He has a normal mood and affect. Judgment normal.   Nursing note and vitals reviewed.        Results for orders placed or performed in visit on 08/23/19   TSH   Result Value Ref Range    TSH 3.200 0.270 - 4.200 mIU/mL   T4, Free   Result Value Ref Range    Free T4 1.26 0.93 - 1.70 ng/dL   Hemoglobin A1c   Result Value Ref Range    Hemoglobin A1C 5.44 4.80 - 5.60 %             Assessment/Plan   Diagnoses and all orders for this visit:    Chronic pain of multiple joints  Comments:  start turmeric and glucosamine samples.  Orders:  -     celecoxib (CELEBREX) 200 MG capsule; Take 1 po daily x 10 days then take prn.    Other specified hypothyroidism  Comments:  stable, continue current regimen.  Orders:  -     levothyroxine (SYNTHROID, LEVOTHROID) 75 MCG tablet; Take 1 tablet by mouth Daily.  -     TSH  -     T4, Free    Erectile dysfunction, unspecified erectile dysfunction type  -     sildenafil (REVATIO) 20 MG tablet; Take 2-5 PO daily prn    IFG (impaired fasting glucose)  -     Hemoglobin A1c                 Return in about 6 months (around 2/23/2020) for Annual.

## 2019-11-13 DIAGNOSIS — M25.50 CHRONIC PAIN OF MULTIPLE JOINTS: ICD-10-CM

## 2019-11-13 DIAGNOSIS — G89.29 CHRONIC PAIN OF MULTIPLE JOINTS: ICD-10-CM

## 2019-11-13 RX ORDER — CELECOXIB 200 MG/1
CAPSULE ORAL
Qty: 30 CAPSULE | Refills: 0 | Status: SHIPPED | OUTPATIENT
Start: 2019-11-13 | End: 2019-12-11 | Stop reason: SDUPTHER

## 2019-12-11 DIAGNOSIS — G89.29 CHRONIC PAIN OF MULTIPLE JOINTS: ICD-10-CM

## 2019-12-11 DIAGNOSIS — M25.50 CHRONIC PAIN OF MULTIPLE JOINTS: ICD-10-CM

## 2019-12-11 RX ORDER — CELECOXIB 200 MG/1
CAPSULE ORAL
Qty: 30 CAPSULE | Refills: 3 | Status: SHIPPED | OUTPATIENT
Start: 2019-12-11 | End: 2020-03-04

## 2020-03-04 ENCOUNTER — HOSPITAL ENCOUNTER (OUTPATIENT)
Dept: GENERAL RADIOLOGY | Facility: HOSPITAL | Age: 60
Discharge: HOME | End: 2020-03-04
Admitting: INTERNAL MEDICINE

## 2020-03-04 ENCOUNTER — OFFICE VISIT (OUTPATIENT)
Dept: INTERNAL MEDICINE | Facility: CLINIC | Age: 60
End: 2020-03-04

## 2020-03-04 ENCOUNTER — APPOINTMENT (OUTPATIENT)
Dept: LAB | Facility: HOSPITAL | Age: 60
End: 2020-03-04

## 2020-03-04 VITALS
DIASTOLIC BLOOD PRESSURE: 72 MMHG | SYSTOLIC BLOOD PRESSURE: 110 MMHG | HEART RATE: 58 BPM | HEIGHT: 70 IN | WEIGHT: 204 LBS | OXYGEN SATURATION: 99 % | BODY MASS INDEX: 29.2 KG/M2

## 2020-03-04 DIAGNOSIS — S46.812A STRAIN OF LEFT TRAPEZIUS MUSCLE, INITIAL ENCOUNTER: Primary | ICD-10-CM

## 2020-03-04 DIAGNOSIS — N52.9 ERECTILE DYSFUNCTION, UNSPECIFIED ERECTILE DYSFUNCTION TYPE: ICD-10-CM

## 2020-03-04 DIAGNOSIS — Z00.00 ANNUAL PHYSICAL EXAM: ICD-10-CM

## 2020-03-04 DIAGNOSIS — E03.8 OTHER SPECIFIED HYPOTHYROIDISM: ICD-10-CM

## 2020-03-04 LAB
25(OH)D3 SERPL-MCNC: 37 NG/ML (ref 30–100)
ALBUMIN SERPL-MCNC: 4.6 G/DL (ref 3.5–5.2)
ALBUMIN/GLOB SERPL: 1.8 G/DL
ALP SERPL-CCNC: 67 U/L (ref 39–117)
ALT SERPL W P-5'-P-CCNC: 21 U/L (ref 1–41)
ANION GAP SERPL CALCULATED.3IONS-SCNC: 11.4 MMOL/L (ref 5–15)
AST SERPL-CCNC: 18 U/L (ref 1–40)
BILIRUB BLD-MCNC: NEGATIVE MG/DL
BILIRUB SERPL-MCNC: 0.5 MG/DL (ref 0.2–1.2)
BUN BLD-MCNC: 18 MG/DL (ref 6–20)
BUN/CREAT SERPL: 17.3 (ref 7–25)
CALCIUM SPEC-SCNC: 9.7 MG/DL (ref 8.6–10.5)
CHLORIDE SERPL-SCNC: 101 MMOL/L (ref 98–107)
CHOLEST SERPL-MCNC: 177 MG/DL (ref 0–200)
CLARITY, POC: CLEAR
CO2 SERPL-SCNC: 28.6 MMOL/L (ref 22–29)
COLOR UR: NORMAL
CREAT BLD-MCNC: 1.04 MG/DL (ref 0.76–1.27)
DEPRECATED RDW RBC AUTO: 40 FL (ref 37–54)
ERYTHROCYTE [DISTWIDTH] IN BLOOD BY AUTOMATED COUNT: 12.6 % (ref 12.3–15.4)
GFR SERPL CREATININE-BSD FRML MDRD: 73 ML/MIN/1.73
GLOBULIN UR ELPH-MCNC: 2.5 GM/DL
GLUCOSE BLD-MCNC: 97 MG/DL (ref 65–99)
GLUCOSE UR STRIP-MCNC: NEGATIVE MG/DL
HCT VFR BLD AUTO: 44.8 % (ref 37.5–51)
HDLC SERPL-MCNC: 63 MG/DL (ref 40–60)
HGB BLD-MCNC: 15.7 G/DL (ref 13–17.7)
KETONES UR QL: NEGATIVE
LDLC SERPL CALC-MCNC: 83 MG/DL (ref 0–100)
LDLC/HDLC SERPL: 1.31 {RATIO}
LEUKOCYTE EST, POC: NEGATIVE
MCH RBC QN AUTO: 30.7 PG (ref 26.6–33)
MCHC RBC AUTO-ENTMCNC: 35 G/DL (ref 31.5–35.7)
MCV RBC AUTO: 87.5 FL (ref 79–97)
NITRITE UR-MCNC: NEGATIVE MG/ML
PH UR: 6 [PH] (ref 5–8)
PLATELET # BLD AUTO: 313 10*3/MM3 (ref 140–450)
PMV BLD AUTO: 10 FL (ref 6–12)
POTASSIUM BLD-SCNC: 3.8 MMOL/L (ref 3.5–5.2)
PROT SERPL-MCNC: 7.1 G/DL (ref 6–8.5)
PROT UR STRIP-MCNC: NEGATIVE MG/DL
PSA SERPL-MCNC: 1.99 NG/ML (ref 0–4)
RBC # BLD AUTO: 5.12 10*6/MM3 (ref 4.14–5.8)
RBC # UR STRIP: NEGATIVE /UL
SODIUM BLD-SCNC: 141 MMOL/L (ref 136–145)
SP GR UR: 1.02 (ref 1–1.03)
TRIGL SERPL-MCNC: 157 MG/DL (ref 0–150)
TSH SERPL DL<=0.05 MIU/L-ACNC: 1.57 UIU/ML (ref 0.27–4.2)
UROBILINOGEN UR QL: NORMAL
VLDLC SERPL-MCNC: 31.4 MG/DL (ref 5–40)
WBC NRBC COR # BLD: 10.21 10*3/MM3 (ref 3.4–10.8)

## 2020-03-04 PROCEDURE — 85027 COMPLETE CBC AUTOMATED: CPT | Performed by: INTERNAL MEDICINE

## 2020-03-04 PROCEDURE — 72040 X-RAY EXAM NECK SPINE 2-3 VW: CPT

## 2020-03-04 PROCEDURE — G0103 PSA SCREENING: HCPCS | Performed by: INTERNAL MEDICINE

## 2020-03-04 PROCEDURE — 81003 URINALYSIS AUTO W/O SCOPE: CPT | Performed by: INTERNAL MEDICINE

## 2020-03-04 PROCEDURE — 80053 COMPREHEN METABOLIC PANEL: CPT | Performed by: INTERNAL MEDICINE

## 2020-03-04 PROCEDURE — 82306 VITAMIN D 25 HYDROXY: CPT | Performed by: INTERNAL MEDICINE

## 2020-03-04 PROCEDURE — 84443 ASSAY THYROID STIM HORMONE: CPT | Performed by: INTERNAL MEDICINE

## 2020-03-04 PROCEDURE — 99396 PREV VISIT EST AGE 40-64: CPT | Performed by: INTERNAL MEDICINE

## 2020-03-04 PROCEDURE — 80061 LIPID PANEL: CPT | Performed by: INTERNAL MEDICINE

## 2020-03-04 RX ORDER — SILDENAFIL CITRATE 20 MG/1
TABLET ORAL
Qty: 30 TABLET | Refills: 5 | Status: SHIPPED | OUTPATIENT
Start: 2020-03-04 | End: 2021-01-11 | Stop reason: SDUPTHER

## 2020-03-04 RX ORDER — CYCLOBENZAPRINE HCL 10 MG
10 TABLET ORAL NIGHTLY
Qty: 30 TABLET | Refills: 3 | Status: SHIPPED | OUTPATIENT
Start: 2020-03-04 | End: 2021-03-08 | Stop reason: SDUPTHER

## 2020-03-04 RX ORDER — DICLOFENAC SODIUM 75 MG/1
75 TABLET, DELAYED RELEASE ORAL 2 TIMES DAILY
Qty: 60 TABLET | Refills: 3 | Status: SHIPPED | OUTPATIENT
Start: 2020-03-04 | End: 2021-03-08

## 2020-03-04 RX ORDER — LEVOTHYROXINE SODIUM 0.07 MG/1
75 TABLET ORAL DAILY
Qty: 90 TABLET | Refills: 1 | Status: SHIPPED | OUTPATIENT
Start: 2020-03-04 | End: 2020-11-06

## 2020-03-04 NOTE — PROGRESS NOTES
Chief Complaint   Patient presents with   • Annual Exam     would like to switch from celebrex to diclofenac   • Headache     for the past 2 months       History of Present Illness  HM, Adult Male: The patient is being seen for a health maintenance evaluation. The last health maintenance visit was many year(s) ago.   Social History: Household members include spouse. He is . Work status: working full time. The patient is a former cigarette smoker and quit smoking 1990- ( 1- 1.5 packs for 10- 15 years). Appx 20 pack years.The patient has no concerns about alcohol abuse. He has never used illicit drugs.   General Health: The patient's health is described as fair. He has regular dental visits. He denies vision problems. He denies hearing loss. Immunizations status: not up to date.   Lifestyle:. He consumes a diverse and healthy diet. He does not have any weight concerns. He exercises regularly. Denies tobacco or alcohol use.  Risk: Uptodate on immunization.  Cancer risk ,Screening is UTD, updated. .    s/p genetic testing - was okay.    Would like diclofenac, as he had it n the past for his shoulder.    Has been havingbad headaches in the base of his head/ upper neck, spreads to the front, started since 1st of the year, and happens everyday, and stays all day, and better when he lays down. Getting worse.    Review of Systems   Constitutional: Negative for chills and fatigue.   HENT: Negative for congestion, ear pain and sore throat.    Eyes: Negative for pain, redness and visual disturbance.   Respiratory: Negative for cough and shortness of breath.    Cardiovascular: Negative for chest pain, palpitations and leg swelling.   Gastrointestinal: Negative for abdominal pain, diarrhea and nausea.   Endocrine: Negative for cold intolerance and heat intolerance.   Genitourinary: Negative for flank pain and urgency.   Musculoskeletal: Positive for arthralgias, neck pain and neck stiffness. Negative for gait problem.  "  Skin: Negative for pallor and rash.   Neurological: Negative for dizziness, weakness and headaches.   Psychiatric/Behavioral: Negative for dysphoric mood and sleep disturbance. The patient is not nervous/anxious.        Patient Active Problem List   Diagnosis   • Esophageal reflux   • Hyperglycemia   • Hypothyroidism   • Vitamin D deficiency   • Duodenitis   • Dyslipidemia   • Family history of prostate cancer in father       Social History     Socioeconomic History   • Marital status:      Spouse name: Not on file   • Number of children: Not on file   • Years of education: Not on file   • Highest education level: Not on file   Tobacco Use   • Smoking status: Former Smoker   Substance and Sexual Activity   • Alcohol use: No   • Sexual activity: Defer       Current Outpatient Medications on File Prior to Visit   Medication Sig Dispense Refill   • DICLOFENAC PO Take  by mouth.       No current facility-administered medications on file prior to visit.        No Known Allergies    /72   Pulse 58   Ht 177.8 cm (70\")   Wt 92.5 kg (204 lb)   SpO2 99% Comment: ra  BMI 29.27 kg/m²          The following portions of the patient's history were reviewed and updated as appropriate: allergies, current medications, past family history, past medical history, past social history, past surgical history and problem list.    Physical Exam    Results for orders placed or performed in visit on 03/04/20   CBC (No Diff)   Result Value Ref Range    WBC 10.21 3.40 - 10.80 10*3/mm3    RBC 5.12 4.14 - 5.80 10*6/mm3    Hemoglobin 15.7 13.0 - 17.7 g/dL    Hematocrit 44.8 37.5 - 51.0 %    MCV 87.5 79.0 - 97.0 fL    MCH 30.7 26.6 - 33.0 pg    MCHC 35.0 31.5 - 35.7 g/dL    RDW 12.6 12.3 - 15.4 %    RDW-SD 40.0 37.0 - 54.0 fl    MPV 10.0 6.0 - 12.0 fL    Platelets 313 140 - 450 10*3/mm3   Comprehensive Metabolic Panel   Result Value Ref Range    Glucose 97 65 - 99 mg/dL    BUN 18 6 - 20 mg/dL    Creatinine 1.04 0.76 - 1.27 mg/dL "    Sodium 141 136 - 145 mmol/L    Potassium 3.8 3.5 - 5.2 mmol/L    Chloride 101 98 - 107 mmol/L    CO2 28.6 22.0 - 29.0 mmol/L    Calcium 9.7 8.6 - 10.5 mg/dL    Total Protein 7.1 6.0 - 8.5 g/dL    Albumin 4.60 3.50 - 5.20 g/dL    ALT (SGPT) 21 1 - 41 U/L    AST (SGOT) 18 1 - 40 U/L    Alkaline Phosphatase 67 39 - 117 U/L    Total Bilirubin 0.5 0.2 - 1.2 mg/dL    eGFR Non African Amer 73 >60 mL/min/1.73    Globulin 2.5 gm/dL    A/G Ratio 1.8 g/dL    BUN/Creatinine Ratio 17.3 7.0 - 25.0    Anion Gap 11.4 5.0 - 15.0 mmol/L   Lipid Panel   Result Value Ref Range    Total Cholesterol 177 0 - 200 mg/dL    Triglycerides 157 (H) 0 - 150 mg/dL    HDL Cholesterol 63 (H) 40 - 60 mg/dL    LDL Cholesterol  83 0 - 100 mg/dL    VLDL Cholesterol 31.4 5 - 40 mg/dL    LDL/HDL Ratio 1.31    TSH   Result Value Ref Range    TSH 1.570 0.270 - 4.200 uIU/mL   Vitamin D 25 Hydroxy   Result Value Ref Range    25 Hydroxy, Vitamin D 37.0 30.0 - 100.0 ng/ml   PSA Screen   Result Value Ref Range    PSA 1.990 0.000 - 4.000 ng/mL   POCT urinalysis dipstick, automated   Result Value Ref Range    Color Dark Yellow Yellow, Straw, Dark Yellow, Karena    Clarity, UA Clear Clear    Specific Gravity  1.025 1.005 - 1.030    pH, Urine 6.0 5.0 - 8.0    Leukocytes Negative Negative    Nitrite, UA Negative Negative    Protein, POC Negative Negative mg/dL    Glucose, UA Negative Negative, 1000 mg/dL (3+) mg/dL    Ketones, UA Negative Negative    Urobilinogen, UA Normal Normal    Bilirubin Negative Negative    Blood, UA Negative Negative       Philippe was seen today for annual exam and headache.    Diagnoses and all orders for this visit:    Strain of left trapezius muscle, initial encounter  -     diclofenac (VOLTAREN) 75 MG EC tablet; Take 1 tablet by mouth 2 (Two) Times a Day. With food  -     cyclobenzaprine (FLEXERIL) 10 MG tablet; Take 1 tablet by mouth Every Night.  -     XR Spine Cervical 2 or 3 View  -     Ambulatory Referral to Physical Therapy  Evaluate and treat    Other specified hypothyroidism  Comments:  stable, continue current regimen.  Orders:  -     levothyroxine (SYNTHROID, LEVOTHROID) 75 MCG tablet; Take 1 tablet by mouth Daily.    Erectile dysfunction, unspecified erectile dysfunction type  -     sildenafil (REVATIO) 20 MG tablet; Take 2-5 PO daily prn    Annual physical exam  -     CBC (No Diff)  -     Comprehensive Metabolic Panel  -     Lipid Panel  -     TSH  -     Vitamin D 25 Hydroxy  -     PSA Screen  -     POCT urinalysis dipstick, automated        Health Maintenance   Topic Date Due   • ZOSTER VACCINE (1 of 2) 08/31/2020 (Originally 4/16/2011)   • ANNUAL PHYSICAL  03/05/2021   • COLONOSCOPY  05/23/2021   • TDAP/TD VACCINES (3 - Td) 09/14/2025   • HEPATITIS C SCREENING  Completed   • INFLUENZA VACCINE  Addressed       Discussion/Summary  Impression: health maintenance visit, healthy adult male.   Currently, he eats a healthy diet and has an adequate exercise regimen.   Prostate cancer screening: PSA was ordered.   Testicular cancer screening: monthly self testicular exam was advised.   Colorectal cancer screening: fecal occult blood testing is needed every year and colonoscopy is utd.   Screening lab work includes glucose, lipid profile and 25-hydroxyvitamin D.   The immunizations are up to date.   Advice and education were given regarding cardiovascular risk reduction, healthy dietary habits, Seatbelt and helmet use and self skin examination.        Return in about 6 months (around 9/4/2020) for Next scheduled follow up.

## 2020-03-09 ENCOUNTER — TELEPHONE (OUTPATIENT)
Dept: INTERNAL MEDICINE | Facility: CLINIC | Age: 60
End: 2020-03-09

## 2020-03-09 NOTE — TELEPHONE ENCOUNTER
----- Message from Stephanie Livingston MD sent at 3/8/2020  7:32 PM EDT -----  Please make sure patient is aware of the following mychart message:    Your neck x-rays show arthritis.  The bone spurs could be causing the pinched nerve, hope you were able to get into physical therapy.    Please call if you are not better, we can proceed with the MRI.

## 2020-03-24 ENCOUNTER — TELEPHONE (OUTPATIENT)
Dept: INTERNAL MEDICINE | Facility: CLINIC | Age: 60
End: 2020-03-24

## 2020-03-24 NOTE — TELEPHONE ENCOUNTER
Pt went to PT 4 times and is still having pain, PT has closed, pt wants to know what the next step would be.  Please advise

## 2020-03-24 NOTE — TELEPHONE ENCOUNTER
All non urgent MRIs are on hold as well.  So if his pain is bearable, he needs to continue any home exercises given by them and stay on the meds as discussed.  If pain is worsening, we can try to get the MRI..      thanks

## 2020-03-24 NOTE — TELEPHONE ENCOUNTER
Patient would like a call back to discuss his therapy that he has completed and what his next step is.

## 2020-09-16 ENCOUNTER — TRANSCRIBE ORDERS (OUTPATIENT)
Dept: INTERNAL MEDICINE | Facility: CLINIC | Age: 60
End: 2020-09-16

## 2020-09-16 ENCOUNTER — LAB (OUTPATIENT)
Dept: LAB | Facility: HOSPITAL | Age: 60
End: 2020-09-16

## 2020-09-16 ENCOUNTER — OFFICE VISIT (OUTPATIENT)
Dept: INTERNAL MEDICINE | Facility: CLINIC | Age: 60
End: 2020-09-16

## 2020-09-16 VITALS
WEIGHT: 203.8 LBS | SYSTOLIC BLOOD PRESSURE: 128 MMHG | OXYGEN SATURATION: 98 % | HEIGHT: 70 IN | HEART RATE: 54 BPM | BODY MASS INDEX: 29.18 KG/M2 | TEMPERATURE: 98 F | DIASTOLIC BLOOD PRESSURE: 82 MMHG

## 2020-09-16 DIAGNOSIS — R51.9 WORSENING HEADACHES: ICD-10-CM

## 2020-09-16 DIAGNOSIS — E03.8 OTHER SPECIFIED HYPOTHYROIDISM: ICD-10-CM

## 2020-09-16 DIAGNOSIS — M25.50 PAIN IN JOINT, MULTIPLE SITES: ICD-10-CM

## 2020-09-16 DIAGNOSIS — M79.641 PAIN IN BOTH HANDS: ICD-10-CM

## 2020-09-16 DIAGNOSIS — M79.642 PAIN IN BOTH HANDS: ICD-10-CM

## 2020-09-16 DIAGNOSIS — M54.2 CERVICALGIA: ICD-10-CM

## 2020-09-16 DIAGNOSIS — M54.2 CERVICALGIA: Primary | ICD-10-CM

## 2020-09-16 DIAGNOSIS — R51.9 WORSENING HEADACHES: Primary | ICD-10-CM

## 2020-09-16 DIAGNOSIS — M05.80 POLYARTHRITIS WITH POSITIVE RHEUMATOID FACTOR (HCC): ICD-10-CM

## 2020-09-16 LAB
CHROMATIN AB SERPL-ACNC: 17.1 IU/ML (ref 0–14)
DEPRECATED RDW RBC AUTO: 39 FL (ref 37–54)
ERYTHROCYTE [DISTWIDTH] IN BLOOD BY AUTOMATED COUNT: 12.5 % (ref 12.3–15.4)
HCT VFR BLD AUTO: 46.2 % (ref 37.5–51)
HGB BLD-MCNC: 15.8 G/DL (ref 13–17.7)
MCH RBC QN AUTO: 29.5 PG (ref 26.6–33)
MCHC RBC AUTO-ENTMCNC: 34.2 G/DL (ref 31.5–35.7)
MCV RBC AUTO: 86.4 FL (ref 79–97)
PLATELET # BLD AUTO: 261 10*3/MM3 (ref 140–450)
PMV BLD AUTO: 9.9 FL (ref 6–12)
RBC # BLD AUTO: 5.35 10*6/MM3 (ref 4.14–5.8)
T4 FREE SERPL-MCNC: 1.27 NG/DL (ref 0.93–1.7)
TSH SERPL DL<=0.05 MIU/L-ACNC: 3.74 UIU/ML (ref 0.27–4.2)
WBC # BLD AUTO: 5.94 10*3/MM3 (ref 3.4–10.8)

## 2020-09-16 PROCEDURE — 86038 ANTINUCLEAR ANTIBODIES: CPT | Performed by: INTERNAL MEDICINE

## 2020-09-16 PROCEDURE — 99214 OFFICE O/P EST MOD 30 MIN: CPT | Performed by: INTERNAL MEDICINE

## 2020-09-16 PROCEDURE — 84443 ASSAY THYROID STIM HORMONE: CPT | Performed by: INTERNAL MEDICINE

## 2020-09-16 PROCEDURE — 86235 NUCLEAR ANTIGEN ANTIBODY: CPT

## 2020-09-16 PROCEDURE — 85027 COMPLETE CBC AUTOMATED: CPT | Performed by: INTERNAL MEDICINE

## 2020-09-16 PROCEDURE — 85652 RBC SED RATE AUTOMATED: CPT | Performed by: INTERNAL MEDICINE

## 2020-09-16 PROCEDURE — 84439 ASSAY OF FREE THYROXINE: CPT | Performed by: INTERNAL MEDICINE

## 2020-09-16 PROCEDURE — 86225 DNA ANTIBODY NATIVE: CPT | Performed by: INTERNAL MEDICINE

## 2020-09-16 PROCEDURE — 86431 RHEUMATOID FACTOR QUANT: CPT | Performed by: INTERNAL MEDICINE

## 2020-09-16 RX ORDER — PREDNISONE 10 MG/1
TABLET ORAL
Qty: 18 TABLET | Refills: 0 | Status: SHIPPED | OUTPATIENT
Start: 2020-09-16 | End: 2021-03-08

## 2020-09-16 RX ORDER — AMOXICILLIN AND CLAVULANATE POTASSIUM 875; 125 MG/1; MG/1
1 TABLET, FILM COATED ORAL 2 TIMES DAILY
Qty: 20 TABLET | Refills: 0 | Status: SHIPPED | OUTPATIENT
Start: 2020-09-16 | End: 2021-03-08

## 2020-09-16 NOTE — PROGRESS NOTES
Hypothyroidism    Subjective   Philippe Mixon is a 60 y.o. male is here today for follow-up.    History of Present Illness   Still having neck pain and headaches. Notes when he bends and gets back, gets very unsteady, and sometimes it gets very sore on the side of his head.  Not all the time. Head is tender near the suture lines.  Pressure on the front and top of the head. occ runs into walls when in a hurry.  Also having hand pain, and bruising,     Current Outpatient Medications:   •  cyclobenzaprine (FLEXERIL) 10 MG tablet, Take 1 tablet by mouth Every Night., Disp: 30 tablet, Rfl: 3  •  diclofenac (VOLTAREN) 75 MG EC tablet, Take 1 tablet by mouth 2 (Two) Times a Day. With food, Disp: 60 tablet, Rfl: 3  •  levothyroxine (SYNTHROID, LEVOTHROID) 75 MCG tablet, Take 1 tablet by mouth Daily., Disp: 90 tablet, Rfl: 1  •  sildenafil (REVATIO) 20 MG tablet, Take 2-5 PO daily prn, Disp: 30 tablet, Rfl: 5  •  amoxicillin-clavulanate (Augmentin) 875-125 MG per tablet, Take 1 tablet by mouth 2 (Two) Times a Day., Disp: 20 tablet, Rfl: 0  •  DICLOFENAC PO, Take  by mouth., Disp: , Rfl:   •  predniSONE (DELTASONE) 10 MG tablet, Take 3 tabs daily x 3 days then 2 tabs daily x 3 days then 1 tab daily x 3 days then stop., Disp: 18 tablet, Rfl: 0      The following portions of the patient's history were reviewed and updated as appropriate: allergies, current medications, past family history, past medical history, past social history, past surgical history and problem list.    Review of Systems   Constitutional: Negative.  Negative for chills and fever.   HENT: Positive for sinus pressure. Negative for ear discharge, ear pain and sore throat.    Respiratory: Negative for cough, chest tightness and shortness of breath.    Cardiovascular: Negative for chest pain, palpitations and leg swelling.   Gastrointestinal: Negative for diarrhea, nausea and vomiting.   Musculoskeletal: Positive for arthralgias and myalgias. Negative for back  "pain.   Neurological: Positive for headaches. Negative for dizziness and syncope.   Hematological: Bruises/bleeds easily.   Psychiatric/Behavioral: Negative for confusion and sleep disturbance.       Objective   /82   Pulse 54   Temp 98 °F (36.7 °C)   Ht 177.8 cm (70\")   Wt 92.4 kg (203 lb 12.8 oz)   SpO2 98% Comment: ra  BMI 29.24 kg/m²   Physical Exam  Vitals signs and nursing note reviewed.   Constitutional:       Appearance: He is well-developed.   HENT:      Head: Normocephalic and atraumatic.      Right Ear: External ear normal. Tympanic membrane is bulging.      Left Ear: External ear normal. Tympanic membrane is bulging.      Nose: Congestion present.      Mouth/Throat:      Pharynx: Posterior oropharyngeal erythema present. No oropharyngeal exudate.      Tonsils: No tonsillar abscesses.   Eyes:      Conjunctiva/sclera: Conjunctivae normal.      Pupils: Pupils are equal, round, and reactive to light.   Neck:      Musculoskeletal: Normal range of motion and neck supple.      Thyroid: No thyromegaly.   Cardiovascular:      Rate and Rhythm: Normal rate and regular rhythm.   Pulmonary:      Effort: Pulmonary effort is normal.      Breath sounds: Normal breath sounds. No stridor.   Abdominal:      General: Bowel sounds are normal. There is no distension.      Palpations: Abdomen is soft.      Tenderness: There is no abdominal tenderness.   Lymphadenopathy:      Cervical: No cervical adenopathy.   Skin:     General: Skin is warm and dry.   Neurological:      Mental Status: He is alert and oriented to person, place, and time.      Cranial Nerves: No cranial nerve deficit.   Psychiatric:         Judgment: Judgment normal.           Results for orders placed or performed in visit on 03/04/20   CBC (No Diff)    Specimen: Blood   Result Value Ref Range    WBC 10.21 3.40 - 10.80 10*3/mm3    RBC 5.12 4.14 - 5.80 10*6/mm3    Hemoglobin 15.7 13.0 - 17.7 g/dL    Hematocrit 44.8 37.5 - 51.0 %    MCV 87.5 79.0 - " 97.0 fL    MCH 30.7 26.6 - 33.0 pg    MCHC 35.0 31.5 - 35.7 g/dL    RDW 12.6 12.3 - 15.4 %    RDW-SD 40.0 37.0 - 54.0 fl    MPV 10.0 6.0 - 12.0 fL    Platelets 313 140 - 450 10*3/mm3   Comprehensive Metabolic Panel    Specimen: Blood   Result Value Ref Range    Glucose 97 65 - 99 mg/dL    BUN 18 6 - 20 mg/dL    Creatinine 1.04 0.76 - 1.27 mg/dL    Sodium 141 136 - 145 mmol/L    Potassium 3.8 3.5 - 5.2 mmol/L    Chloride 101 98 - 107 mmol/L    CO2 28.6 22.0 - 29.0 mmol/L    Calcium 9.7 8.6 - 10.5 mg/dL    Total Protein 7.1 6.0 - 8.5 g/dL    Albumin 4.60 3.50 - 5.20 g/dL    ALT (SGPT) 21 1 - 41 U/L    AST (SGOT) 18 1 - 40 U/L    Alkaline Phosphatase 67 39 - 117 U/L    Total Bilirubin 0.5 0.2 - 1.2 mg/dL    eGFR Non African Amer 73 >60 mL/min/1.73    Globulin 2.5 gm/dL    A/G Ratio 1.8 g/dL    BUN/Creatinine Ratio 17.3 7.0 - 25.0    Anion Gap 11.4 5.0 - 15.0 mmol/L   Lipid Panel    Specimen: Blood   Result Value Ref Range    Total Cholesterol 177 0 - 200 mg/dL    Triglycerides 157 (H) 0 - 150 mg/dL    HDL Cholesterol 63 (H) 40 - 60 mg/dL    LDL Cholesterol  83 0 - 100 mg/dL    VLDL Cholesterol 31.4 5 - 40 mg/dL    LDL/HDL Ratio 1.31    TSH    Specimen: Blood   Result Value Ref Range    TSH 1.570 0.270 - 4.200 uIU/mL   Vitamin D 25 Hydroxy    Specimen: Blood   Result Value Ref Range    25 Hydroxy, Vitamin D 37.0 30.0 - 100.0 ng/ml   PSA Screen    Specimen: Blood   Result Value Ref Range    PSA 1.990 0.000 - 4.000 ng/mL   POCT urinalysis dipstick, automated    Specimen: Urine   Result Value Ref Range    Color Dark Yellow Yellow, Straw, Dark Yellow, Karena    Clarity, UA Clear Clear    Specific Gravity  1.025 1.005 - 1.030    pH, Urine 6.0 5.0 - 8.0    Leukocytes Negative Negative    Nitrite, UA Negative Negative    Protein, POC Negative Negative mg/dL    Glucose, UA Negative Negative, 1000 mg/dL (3+) mg/dL    Ketones, UA Negative Negative    Urobilinogen, UA Normal Normal    Bilirubin Negative Negative    Blood, UA  Negative Negative             Assessment/Plan   Diagnoses and all orders for this visit:    Worsening headaches  -     MRI Brain Without Contrast; Future  -     amoxicillin-clavulanate (Augmentin) 875-125 MG per tablet; Take 1 tablet by mouth 2 (Two) Times a Day.  -     predniSONE (DELTASONE) 10 MG tablet; Take 3 tabs daily x 3 days then 2 tabs daily x 3 days then 1 tab daily x 3 days then stop.  -     CBC (No Diff); Future    Cervicalgia  -     MRI Cervical Spine Without Contrast; Future    Other specified hypothyroidism  -     TSH; Future  -     T4, Free; Future    Pain in both hands  -     CBC (No Diff); Future  -     JUAN With / DsDNA, RNP, Sjogrens A / B, Saab; Future  -     Sedimentation Rate; Future  -     Rheumatoid Factor; Future    Pain in joint, multiple sites  -     CBC (No Diff); Future  -     JUAN With / DsDNA, RNP, Sjogrens A / B, Saab; Future  -     Sedimentation Rate; Future  -     Rheumatoid Factor; Future                 Return in about 6 months (around 3/16/2021) for Annual.

## 2020-09-17 LAB
ANA SER QL: POSITIVE
CENTROMERE B AB SER-ACNC: <0.2 AI (ref 0–0.9)
CHROMATIN AB SERPL-ACNC: <0.2 AI (ref 0–0.9)
DSDNA AB SER-ACNC: 7 IU/ML (ref 0–9)
ENA JO1 AB SER-ACNC: <0.2 AI (ref 0–0.9)
ENA RNP AB SER-ACNC: 3.3 AI (ref 0–0.9)
ENA SCL70 AB SER-ACNC: <0.2 AI (ref 0–0.9)
ENA SM AB SER-ACNC: <0.2 AI (ref 0–0.9)
ENA SS-A AB SER-ACNC: <0.2 AI (ref 0–0.9)
ENA SS-B AB SER-ACNC: <0.2 AI (ref 0–0.9)
ERYTHROCYTE [SEDIMENTATION RATE] IN BLOOD: 1 MM/HR (ref 0–20)
Lab: ABNORMAL

## 2020-10-01 ENCOUNTER — TELEPHONE (OUTPATIENT)
Dept: INTERNAL MEDICINE | Facility: CLINIC | Age: 60
End: 2020-10-01

## 2020-10-01 NOTE — TELEPHONE ENCOUNTER
Patient states that he had labs drawn recently and a brain scan and would like to see if he could get the results.  He can be reached at [phone]

## 2020-10-01 NOTE — TELEPHONE ENCOUNTER
Please let him know that the MRI of his brain is consistent with chronic migraines.  Neck MRI shows arthritis and disk degeneration in multiple levels.  If his neck pain is worsening, he may benefit from pain management eval for possible injections.    thanks

## 2020-10-13 ENCOUNTER — TELEPHONE (OUTPATIENT)
Dept: INTERNAL MEDICINE | Facility: CLINIC | Age: 60
End: 2020-10-13

## 2020-10-13 NOTE — TELEPHONE ENCOUNTER
[facility] for pt to let them know MRI's were authorized by both [employer].  Dr. Livingston also did a peer to peer for MRI's.      Let pt know also that any request for records must come from the insurance company themselves.  We cannot blindly send records.

## 2020-10-13 NOTE — TELEPHONE ENCOUNTER
Patient was referred for an MRI and his insurance is not wanting to cover it. They are requesting the records so they can try and get it processed for medical necessity.     You can fax the records to Jessica at [phone]

## 2020-11-06 DIAGNOSIS — E03.8 OTHER SPECIFIED HYPOTHYROIDISM: ICD-10-CM

## 2020-11-06 RX ORDER — LEVOTHYROXINE SODIUM 0.07 MG/1
TABLET ORAL
Qty: 90 TABLET | Refills: 1 | Status: SHIPPED | OUTPATIENT
Start: 2020-11-06 | End: 2021-03-08 | Stop reason: SDUPTHER

## 2021-01-11 DIAGNOSIS — N52.9 ERECTILE DYSFUNCTION, UNSPECIFIED ERECTILE DYSFUNCTION TYPE: ICD-10-CM

## 2021-01-11 RX ORDER — SILDENAFIL CITRATE 20 MG/1
TABLET ORAL
Qty: 30 TABLET | Refills: 1 | Status: SHIPPED | OUTPATIENT
Start: 2021-01-11 | End: 2021-01-11 | Stop reason: SDUPTHER

## 2021-01-11 RX ORDER — SILDENAFIL 100 MG/1
50-100 TABLET, FILM COATED ORAL DAILY PRN
Qty: 30 TABLET | Refills: 1 | Status: SHIPPED | OUTPATIENT
Start: 2021-01-11 | End: 2021-03-08 | Stop reason: SDUPTHER

## 2021-01-11 NOTE — TELEPHONE ENCOUNTER
Caller: Philippe Mixon    Relationship: Self    Best call back number:[phone]     Medication needed:   Requested Prescriptions     Pending Prescriptions Disp Refills   • sildenafil (REVATIO) 20 MG tablet 30 tablet 5     Sig: Take 2-5 PO daily prn       When do you need the refill by: ASAP  What details did the patient provide when requesting the medication: OUT OF MEDICATION    Does the patient have less than a 3 day supply:  [x] Yes  [] No    What is the patient's preferred pharmacy: [facility] [address] - [address] AT [facility] [address] - [phone]  - [phone] FX

## 2021-01-11 NOTE — TELEPHONE ENCOUNTER
Last Office Visit: 09/16/20  Next Office Visit:03/08/20    Labs completed in past 6 months?yes  Labs completed in past year? yes    Last Refill Date:3/4/20  Quantity:30  Refills:5    Pharmacy:

## 2021-01-25 ENCOUNTER — TRANSCRIBE ORDERS (OUTPATIENT)
Dept: INTERNAL MEDICINE | Facility: CLINIC | Age: 61
End: 2021-01-25

## 2021-01-25 ENCOUNTER — TELEPHONE (OUTPATIENT)
Dept: INTERNAL MEDICINE | Facility: CLINIC | Age: 61
End: 2021-01-25

## 2021-01-25 NOTE — TELEPHONE ENCOUNTER
Advised pt that Dr Livingston does not do disability forms, states it is a simple form for diagnosis. I asked pt if he could get us another copy of this and states he will mail it to office to my attention.

## 2021-01-25 NOTE — TELEPHONE ENCOUNTER
Pt called stating he dropped off disability paperwork for Dr. Livingston to fill out several months ago (4-5 months ago).      I was not able to find anything in Epic regarding this.  Pt would like a call back regarding these forms.

## 2021-03-08 ENCOUNTER — LAB (OUTPATIENT)
Dept: LAB | Facility: HOSPITAL | Age: 61
End: 2021-03-08

## 2021-03-08 ENCOUNTER — OFFICE VISIT (OUTPATIENT)
Dept: INTERNAL MEDICINE | Facility: CLINIC | Age: 61
End: 2021-03-08

## 2021-03-08 VITALS
DIASTOLIC BLOOD PRESSURE: 80 MMHG | HEIGHT: 70 IN | TEMPERATURE: 97 F | BODY MASS INDEX: 28.23 KG/M2 | HEART RATE: 64 BPM | SYSTOLIC BLOOD PRESSURE: 122 MMHG | WEIGHT: 197.2 LBS

## 2021-03-08 DIAGNOSIS — K21.00 GASTROESOPHAGEAL REFLUX DISEASE WITH ESOPHAGITIS WITHOUT HEMORRHAGE: ICD-10-CM

## 2021-03-08 DIAGNOSIS — M25.50 CHRONIC PAIN OF MULTIPLE JOINTS: ICD-10-CM

## 2021-03-08 DIAGNOSIS — M54.2 CERVICALGIA: ICD-10-CM

## 2021-03-08 DIAGNOSIS — Z12.5 PROSTATE CANCER SCREENING: ICD-10-CM

## 2021-03-08 DIAGNOSIS — N52.9 ERECTILE DYSFUNCTION, UNSPECIFIED ERECTILE DYSFUNCTION TYPE: ICD-10-CM

## 2021-03-08 DIAGNOSIS — G89.29 CHRONIC PAIN OF MULTIPLE JOINTS: ICD-10-CM

## 2021-03-08 DIAGNOSIS — Z00.00 ROUTINE GENERAL MEDICAL EXAMINATION AT A HEALTH CARE FACILITY: ICD-10-CM

## 2021-03-08 DIAGNOSIS — R51.9 CHRONIC INTRACTABLE HEADACHE, UNSPECIFIED HEADACHE TYPE: ICD-10-CM

## 2021-03-08 DIAGNOSIS — Z80.42 FAMILY HISTORY OF PROSTATE CANCER IN FATHER: ICD-10-CM

## 2021-03-08 DIAGNOSIS — G89.29 CHRONIC INTRACTABLE HEADACHE, UNSPECIFIED HEADACHE TYPE: ICD-10-CM

## 2021-03-08 DIAGNOSIS — R73.01 IFG (IMPAIRED FASTING GLUCOSE): ICD-10-CM

## 2021-03-08 DIAGNOSIS — E03.8 OTHER SPECIFIED HYPOTHYROIDISM: ICD-10-CM

## 2021-03-08 DIAGNOSIS — Z00.00 ROUTINE GENERAL MEDICAL EXAMINATION AT A HEALTH CARE FACILITY: Primary | ICD-10-CM

## 2021-03-08 LAB
25(OH)D3 SERPL-MCNC: 47.6 NG/ML (ref 30–100)
ALBUMIN SERPL-MCNC: 4.6 G/DL (ref 3.5–5.2)
ALBUMIN/GLOB SERPL: 1.8 G/DL
ALP SERPL-CCNC: 84 U/L (ref 39–117)
ALT SERPL W P-5'-P-CCNC: 20 U/L (ref 1–41)
ANION GAP SERPL CALCULATED.3IONS-SCNC: 8.6 MMOL/L (ref 5–15)
AST SERPL-CCNC: 23 U/L (ref 1–40)
BILIRUB BLD-MCNC: NEGATIVE MG/DL
BILIRUB SERPL-MCNC: 0.4 MG/DL (ref 0–1.2)
BUN SERPL-MCNC: 13 MG/DL (ref 6–20)
BUN/CREAT SERPL: 14.9 (ref 7–25)
CALCIUM SPEC-SCNC: 9.2 MG/DL (ref 8.6–10.5)
CHLORIDE SERPL-SCNC: 104 MMOL/L (ref 98–107)
CHOLEST SERPL-MCNC: 142 MG/DL (ref 0–200)
CLARITY, POC: CLEAR
CO2 SERPL-SCNC: 28.4 MMOL/L (ref 22–29)
COLOR UR: YELLOW
CREAT SERPL-MCNC: 0.87 MG/DL (ref 0.76–1.27)
DEPRECATED RDW RBC AUTO: 42.5 FL (ref 37–54)
ERYTHROCYTE [DISTWIDTH] IN BLOOD BY AUTOMATED COUNT: 13.4 % (ref 12.3–15.4)
GFR SERPL CREATININE-BSD FRML MDRD: 90 ML/MIN/1.73
GLOBULIN UR ELPH-MCNC: 2.6 GM/DL
GLUCOSE SERPL-MCNC: 96 MG/DL (ref 65–99)
GLUCOSE UR STRIP-MCNC: NEGATIVE MG/DL
HBA1C MFR BLD: 5.57 % (ref 4.8–5.6)
HCT VFR BLD AUTO: 46.7 % (ref 37.5–51)
HDLC SERPL-MCNC: 59 MG/DL (ref 40–60)
HGB BLD-MCNC: 16.4 G/DL (ref 13–17.7)
KETONES UR QL: NEGATIVE
LDLC SERPL CALC-MCNC: 66 MG/DL (ref 0–100)
LDLC/HDLC SERPL: 1.09 {RATIO}
LEUKOCYTE EST, POC: NEGATIVE
MCH RBC QN AUTO: 31 PG (ref 26.6–33)
MCHC RBC AUTO-ENTMCNC: 35.1 G/DL (ref 31.5–35.7)
MCV RBC AUTO: 88.3 FL (ref 79–97)
NITRITE UR-MCNC: NEGATIVE MG/ML
PH UR: 6 [PH] (ref 5–8)
PLATELET # BLD AUTO: 309 10*3/MM3 (ref 140–450)
PMV BLD AUTO: 9.3 FL (ref 6–12)
POTASSIUM SERPL-SCNC: 4.2 MMOL/L (ref 3.5–5.2)
PROT SERPL-MCNC: 7.2 G/DL (ref 6–8.5)
PROT UR STRIP-MCNC: NEGATIVE MG/DL
PSA SERPL-MCNC: 2.6 NG/ML (ref 0–4)
RBC # BLD AUTO: 5.29 10*6/MM3 (ref 4.14–5.8)
RBC # UR STRIP: NEGATIVE /UL
SODIUM SERPL-SCNC: 141 MMOL/L (ref 136–145)
SP GR UR: 1.01 (ref 1–1.03)
T4 FREE SERPL-MCNC: 1.35 NG/DL (ref 0.93–1.7)
TRIGL SERPL-MCNC: 92 MG/DL (ref 0–150)
TSH SERPL DL<=0.05 MIU/L-ACNC: 2.65 UIU/ML (ref 0.27–4.2)
UROBILINOGEN UR QL: NORMAL
VIT B12 BLD-MCNC: 709 PG/ML (ref 211–946)
VLDLC SERPL-MCNC: 17 MG/DL (ref 5–40)
WBC # BLD AUTO: 5.69 10*3/MM3 (ref 3.4–10.8)

## 2021-03-08 PROCEDURE — 82270 OCCULT BLOOD FECES: CPT | Performed by: INTERNAL MEDICINE

## 2021-03-08 PROCEDURE — 84443 ASSAY THYROID STIM HORMONE: CPT

## 2021-03-08 PROCEDURE — 82607 VITAMIN B-12: CPT

## 2021-03-08 PROCEDURE — G0103 PSA SCREENING: HCPCS

## 2021-03-08 PROCEDURE — 80053 COMPREHEN METABOLIC PANEL: CPT

## 2021-03-08 PROCEDURE — 99213 OFFICE O/P EST LOW 20 MIN: CPT | Performed by: INTERNAL MEDICINE

## 2021-03-08 PROCEDURE — 85027 COMPLETE CBC AUTOMATED: CPT

## 2021-03-08 PROCEDURE — 99396 PREV VISIT EST AGE 40-64: CPT | Performed by: INTERNAL MEDICINE

## 2021-03-08 PROCEDURE — 82306 VITAMIN D 25 HYDROXY: CPT

## 2021-03-08 PROCEDURE — 81003 URINALYSIS AUTO W/O SCOPE: CPT | Performed by: INTERNAL MEDICINE

## 2021-03-08 PROCEDURE — 84439 ASSAY OF FREE THYROXINE: CPT

## 2021-03-08 PROCEDURE — 83036 HEMOGLOBIN GLYCOSYLATED A1C: CPT

## 2021-03-08 PROCEDURE — 80061 LIPID PANEL: CPT

## 2021-03-08 RX ORDER — CYCLOBENZAPRINE HCL 10 MG
10 TABLET ORAL NIGHTLY
Qty: 30 TABLET | Refills: 3 | Status: SHIPPED | OUTPATIENT
Start: 2021-03-08 | End: 2021-09-13 | Stop reason: SDUPTHER

## 2021-03-08 RX ORDER — FOLIC ACID 1 MG/1
1 TABLET ORAL DAILY
COMMUNITY
Start: 2020-12-09

## 2021-03-08 RX ORDER — LEVOTHYROXINE SODIUM 0.07 MG/1
75 TABLET ORAL DAILY
Qty: 90 TABLET | Refills: 1 | Status: SHIPPED | OUTPATIENT
Start: 2021-03-08 | End: 2021-09-13 | Stop reason: SDUPTHER

## 2021-03-08 RX ORDER — CELECOXIB 200 MG/1
200 CAPSULE ORAL DAILY
Qty: 30 CAPSULE | Refills: 5 | Status: SHIPPED | OUTPATIENT
Start: 2021-03-08 | End: 2021-09-13 | Stop reason: SDUPTHER

## 2021-03-08 RX ORDER — SILDENAFIL 100 MG/1
50-100 TABLET, FILM COATED ORAL DAILY PRN
Qty: 30 TABLET | Refills: 1 | Status: SHIPPED | OUTPATIENT
Start: 2021-03-08 | End: 2021-05-03

## 2021-03-08 RX ORDER — DICLOFENAC SODIUM 75 MG/1
75 TABLET, DELAYED RELEASE ORAL 2 TIMES DAILY
Qty: 60 TABLET | Refills: 3 | Status: CANCELLED | OUTPATIENT
Start: 2021-03-08

## 2021-03-08 RX ORDER — IBUPROFEN 800 MG/1
1 TABLET ORAL EVERY 12 HOURS
COMMUNITY
End: 2021-03-08

## 2021-03-08 RX ORDER — OMEPRAZOLE 20 MG/1
20 CAPSULE, DELAYED RELEASE ORAL DAILY
Qty: 30 CAPSULE | Refills: 5 | Status: SHIPPED | OUTPATIENT
Start: 2021-03-08 | End: 2021-09-13 | Stop reason: SDUPTHER

## 2021-03-08 RX ORDER — OMEPRAZOLE 20 MG/1
1 CAPSULE, DELAYED RELEASE ORAL DAILY
COMMUNITY
End: 2021-03-08 | Stop reason: SDUPTHER

## 2021-03-08 NOTE — PROGRESS NOTES
Chief Complaint   Patient presents with   • Annual Exam   • Hip Pain     left for the past couple months       History of Present Illness  HM, Adult Male: The patient is being seen for a health maintenance evaluation. The last health maintenance visit was many year(s) ago.   Social History: Household members include spouse. He is . Work status: working full time. The patient is a former cigarette smoker and quit smoking 1990. Appx 20 pack years.The patient has no concerns about alcohol abuse. He has never used illicit drugs.   General Health: The patient's health is described as fair. He has regular dental visits. He denies vision problems. He denies hearing loss. Immunizations status: not up to date.   Lifestyle:. He consumes a diverse and healthy diet. He does not have any weight concerns. He exercises regularly. Denies tobacco or alcohol use.  Risk: Uptodate on immunization.  Cancer risk ,Screening is UTD, updated.    Having all kind of issues, back and neck pain, increased HA. Left hip pain, and unable to sit in the car for > 20 min.    Now dxed with RA by Dr. Vega. Started on MTX and folic acid, and has not seen any improvement in his pain, and hands and feet hurt. Pain in the bottom of his feet when walking.  Feels like he is going down in the last few months. his hands and feet witrh pain in the plantar aspect of feet.  Back issues are through workers comp.    To see Dr. Vega later today.      Review of Systems   Constitutional: Positive for activity change and fatigue. Negative for chills.   HENT: Negative for congestion, ear pain and sore throat.    Eyes: Negative for pain, redness and visual disturbance.   Respiratory: Negative for cough and shortness of breath.    Cardiovascular: Negative for chest pain, palpitations and leg swelling.   Gastrointestinal: Negative for abdominal pain, diarrhea and nausea.   Endocrine: Negative for cold intolerance and heat intolerance.   Genitourinary:  "Negative for flank pain and urgency.   Musculoskeletal: Positive for arthralgias, back pain, gait problem, myalgias, neck pain and neck stiffness.   Skin: Negative for pallor and rash.   Neurological: Positive for weakness. Negative for dizziness and headaches.   Psychiatric/Behavioral: Negative for dysphoric mood and sleep disturbance. The patient is not nervous/anxious.        Patient Active Problem List   Diagnosis   • Esophageal reflux   • Hyperglycemia   • Hypothyroidism   • Vitamin D deficiency   • Duodenitis   • Dyslipidemia   • Family history of prostate cancer in father       Social History     Socioeconomic History   • Marital status:      Spouse name: Not on file   • Number of children: Not on file   • Years of education: Not on file   • Highest education level: Not on file   Tobacco Use   • Smoking status: Former Smoker   Substance and Sexual Activity   • Alcohol use: No   • Sexual activity: Defer       Current Outpatient Medications on File Prior to Visit   Medication Sig Dispense Refill   • Cholecalciferol 25 MCG (1000 UT) capsule Take 1 capsule by mouth Daily.     • folic acid (FOLVITE) 1 MG tablet Take 1 tablet by mouth Daily.     • methotrexate 2.5 MG tablet Take 6 tablets by mouth 1 (One) Time Per Week.       No current facility-administered medications on file prior to visit.       No Known Allergies    /80   Pulse 64   Temp 97 °F (36.1 °C)   Ht 177.8 cm (70\")   Wt 89.4 kg (197 lb 3.2 oz)   BMI 28.30 kg/m²          The following portions of the patient's history were reviewed and updated as appropriate: allergies, current medications, past family history, past medical history, past social history, past surgical history and problem list.    Physical Exam  Vitals and nursing note reviewed.   Constitutional:       Appearance: Normal appearance. He is well-developed. He is not diaphoretic.   HENT:      Head: Normocephalic and atraumatic.      Right Ear: Tympanic membrane and external " ear normal.      Left Ear: Tympanic membrane and external ear normal.      Nose: Nose normal.      Mouth/Throat:      Pharynx: No oropharyngeal exudate.   Eyes:      General: No scleral icterus.        Right eye: No discharge.         Left eye: No discharge.      Conjunctiva/sclera: Conjunctivae normal.      Pupils: Pupils are equal, round, and reactive to light.   Neck:      Thyroid: No thyroid mass or thyromegaly.      Vascular: No carotid bruit or JVD.      Trachea: No tracheal deviation.   Cardiovascular:      Rate and Rhythm: Normal rate and regular rhythm.      Pulses: Normal pulses.      Heart sounds: Normal heart sounds, S1 normal and S2 normal. No murmur. No friction rub. No gallop.    Pulmonary:      Effort: Pulmonary effort is normal. No respiratory distress.      Breath sounds: Normal breath sounds. No decreased breath sounds, wheezing, rhonchi or rales.   Abdominal:      General: Bowel sounds are normal. There is no distension.      Palpations: Abdomen is soft. Abdomen is not rigid. There is no mass.      Tenderness: There is no abdominal tenderness. There is no right CVA tenderness, left CVA tenderness, guarding or rebound.      Hernia: No hernia is present. There is no hernia in the left inguinal area.   Genitourinary:     Prostate: Normal. Not enlarged and not tender.      Rectum: Guaiac result negative. No mass, external hemorrhoid or internal hemorrhoid.      Comments: Chaperone declined  Musculoskeletal:         General: Tenderness (neck or low back) and deformity present. No swelling.      Right shoulder: Normal.      Left shoulder: Normal.      Cervical back: Normal range of motion and neck supple. No swelling, rigidity or tenderness. No muscular tenderness. Normal range of motion.      Thoracic back: No deformity or tenderness. Normal range of motion.      Lumbar back: No deformity or tenderness. Normal range of motion.      Right hip: Normal.      Left hip: Normal.      Right lower leg: No  edema.      Left lower leg: No edema.   Lymphadenopathy:      Cervical: No cervical adenopathy.      Lower Body: No right inguinal adenopathy. No left inguinal adenopathy.   Skin:     General: Skin is warm and dry.      Findings: No bruising, ecchymosis, erythema, lesion or rash.   Neurological:      Mental Status: He is alert and oriented to person, place, and time. He is not disoriented.      Cranial Nerves: No cranial nerve deficit.      Sensory: No sensory deficit.      Motor: No weakness.      Coordination: Coordination normal.      Gait: Gait abnormal.      Deep Tendon Reflexes: Reflexes are normal and symmetric. Reflexes normal. Babinski sign absent on the right side. Babinski sign absent on the left side.   Psychiatric:         Mood and Affect: Mood normal.         Behavior: Behavior normal.         Thought Content: Thought content normal.         Judgment: Judgment normal.         Results for orders placed or performed in visit on 03/08/21   POCT urinalysis dipstick, automated    Specimen: Urine   Result Value Ref Range    Color Yellow Yellow, Straw, Dark Yellow, Karena    Clarity, UA Clear Clear    Specific Gravity  1.015 1.005 - 1.030    pH, Urine 6.0 5.0 - 8.0    Leukocytes Negative Negative    Nitrite, UA Negative Negative    Protein, POC Negative Negative mg/dL    Glucose, UA Negative Negative, 1000 mg/dL (3+) mg/dL    Ketones, UA Negative Negative    Urobilinogen, UA Normal Normal    Bilirubin Negative Negative    Blood, UA Negative Negative       Diagnoses and all orders for this visit:    1. Routine general medical examination at a health care facility (Primary)  -     POCT urinalysis dipstick, automated  -     PSA Screen; Future  -     CBC (No Diff); Future  -     Comprehensive Metabolic Panel; Future  -     Lipid Panel; Future  -     TSH; Future  -     Vitamin D 25 Hydroxy; Future  -     T4, Free; Future  -     Vitamin B12; Future    2. Other specified hypothyroidism  Comments:  stable, continue  current regimen.  Orders:  -     levothyroxine (SYNTHROID, LEVOTHROID) 75 MCG tablet; Take 1 tablet by mouth Daily.  Dispense: 90 tablet; Refill: 1    3. Chronic pain of multiple joints  -     cyclobenzaprine (FLEXERIL) 10 MG tablet; Take 1 tablet by mouth Every Night.  Dispense: 30 tablet; Refill: 3  -     celecoxib (CeleBREX) 200 MG capsule; Take 1 capsule by mouth Daily. Replaces ibuprofen  Dispense: 30 capsule; Refill: 5    4. Erectile dysfunction, unspecified erectile dysfunction type  -     sildenafil (Viagra) 100 MG tablet; Take 0.5-1 tablets by mouth Daily As Needed for Erectile Dysfunction.  Dispense: 30 tablet; Refill: 1    5. Gastroesophageal reflux disease with esophagitis without hemorrhage  -     omeprazole (priLOSEC) 20 MG capsule; Take 1 capsule by mouth Daily.  Dispense: 30 capsule; Refill: 5    6. Cervicalgia  -     Ambulatory Referral to Pain Management    7. Chronic intractable headache, unspecified headache type  -     Ambulatory Referral to Pain Management    8. Family history of prostate cancer in father    9. Prostate cancer screening  -     PSA Screen; Future    10. IFG (impaired fasting glucose)  -     Hemoglobin A1c; Future    Other orders  -     Cancel: diclofenac (VOLTAREN) 75 MG EC tablet; Take 1 tablet by mouth 2 (Two) Times a Day. With food  Dispense: 60 tablet; Refill: 3      D/w Pt and wife Collette.  His back is currently under Workman's comp. If the  claims otherwise, then he can move it to his insurance. Very frustrated that he could not have his back issues covered despite having 2 insurances.    Health Maintenance   Topic Date Due   • ZOSTER VACCINE (1 of 2) Never done   • INFLUENZA VACCINE  09/16/2021 (Originally 8/1/2020)   • COLONOSCOPY  05/23/2021   • ANNUAL PHYSICAL  03/09/2022   • TDAP/TD VACCINES (3 - Td) 09/14/2025   • HEPATITIS C SCREENING  Completed   • Pneumococcal Vaccine 0-64  Aged Out   • MENINGOCOCCAL VACCINE  Aged Out       Discussion/Summary  Impression:  health maintenance visit, healthy adult male.   Currently, he eats a healthy diet and has an adequate exercise regimen.   Prostate cancer screening: PSA was ordered.   Testicular cancer screening: monthly self testicular exam was advised.   Colorectal cancer screening: fecal occult blood testing is needed every year and colonoscopy is utd .   Screening lab work includes glucose, lipid profile and 25-hydroxyvitamin D.   The immunizations due- reiterated covid vaccination    Advice and education were given regarding cardiovascular risk reduction, healthy dietary habits, Seatbelt and helmet use and self skin examination.        Return in about 6 months (around 9/8/2021) for Next scheduled follow up.

## 2021-03-10 ENCOUNTER — TELEPHONE (OUTPATIENT)
Dept: INTERNAL MEDICINE | Facility: CLINIC | Age: 61
End: 2021-03-10

## 2021-03-10 NOTE — TELEPHONE ENCOUNTER
Advised pt that he will need to take the CD of MRI with him, phone number given for pt to call for this.

## 2021-03-10 NOTE — TELEPHONE ENCOUNTER
Caller: Philippe Mixon    Relationship: Self    Best call back number: [phone]   What form or medical record are you requesting: COPY OF MRI   Who is requesting this form or medical record from you: PATIENT  How would you like to receive the form or medical records (pick-up, mail, fax): MAILED    [address] [facility] [id number]    Timeframe paperwork needed: COPY OF MRI    Additional notes: THE PATIENT REPORTS THAT DR MARS REFERRED HIM TO PAIN MANAGEMENT AND IS REQUESTING A COPY HIS MRI RESULTS SO HE CAN TAKE THIS TO THE APPOINTMENT TO SEE THE PAIN MANAGEMENT TO SEE DR ANGELES.      THE PATIENT STATES THE  AT DR ANGELES'S OFFICE IS THE ONE THAT STATED HE NEEDED TO BRING IN THE MRI RESULTS, BUT THE PATIENT STATES THAT BRIDGETTE IS A  DR AND STATES IF DR ANGELES CAN VIEW THE RESULTS IN THE PATIENT'S CHART, THEN PERHAPS A COPY DOES NOT NEED TO BE MAILED TO THE PATIENT.    PLEASE CALL AND ADVISE [phone].

## 2021-03-10 NOTE — TELEPHONE ENCOUNTER
PT CALLED BACK AND SAID THE NUMBER THAT [facility] PROVIDED WAS NOT THE LOCATION WHERE HE HAD THE MRI.  THEY ADVISED HIM THAT THEY DON'T HAVE RECORD OF IT.  I LOOKED IN HIS REFERRALS AND DISCOVERED HE HAD THE MRIS COMPLETED AT [facility].  HE WILL CALL THERE TO OBTAIN THE CD

## 2021-03-30 PROBLEM — M50.30 DDD (DEGENERATIVE DISC DISEASE), CERVICAL: Status: ACTIVE | Noted: 2021-03-30

## 2021-03-30 PROBLEM — M47.812 CERVICAL SPONDYLOSIS WITHOUT MYELOPATHY: Status: ACTIVE | Noted: 2021-03-30

## 2021-04-01 ENCOUNTER — OFFICE VISIT (OUTPATIENT)
Dept: PAIN MEDICINE | Facility: CLINIC | Age: 61
End: 2021-04-01

## 2021-04-01 VITALS
HEART RATE: 65 BPM | RESPIRATION RATE: 15 BRPM | HEIGHT: 70 IN | OXYGEN SATURATION: 98 % | WEIGHT: 197.2 LBS | BODY MASS INDEX: 28.23 KG/M2 | DIASTOLIC BLOOD PRESSURE: 80 MMHG | SYSTOLIC BLOOD PRESSURE: 120 MMHG | TEMPERATURE: 97.8 F

## 2021-04-01 DIAGNOSIS — M71.9 CERVICOTHORACIC INTERSPINOUS BURSITIS: ICD-10-CM

## 2021-04-01 DIAGNOSIS — M79.18 MYOFASCIAL PAIN: ICD-10-CM

## 2021-04-01 DIAGNOSIS — M50.30 DDD (DEGENERATIVE DISC DISEASE), CERVICAL: ICD-10-CM

## 2021-04-01 DIAGNOSIS — M05.9 RHEUMATOID ARTHRITIS WITH POSITIVE RHEUMATOID FACTOR, INVOLVING UNSPECIFIED SITE (HCC): ICD-10-CM

## 2021-04-01 DIAGNOSIS — M47.812 CERVICAL SPONDYLOSIS WITHOUT MYELOPATHY: Primary | ICD-10-CM

## 2021-04-01 DIAGNOSIS — G47.01 INSOMNIA DUE TO MEDICAL CONDITION: ICD-10-CM

## 2021-04-01 DIAGNOSIS — M47.812 CERVICAL SPONDYLOSIS WITHOUT MYELOPATHY: ICD-10-CM

## 2021-04-01 DIAGNOSIS — R51.9 OCCIPITAL HEADACHE: ICD-10-CM

## 2021-04-01 PROCEDURE — 99204 OFFICE O/P NEW MOD 45 MIN: CPT | Performed by: ANESTHESIOLOGY

## 2021-04-01 RX ORDER — ME-TETRAHYDROFOLATE/B12/HRB236 1-1-500 MG
1 CAPSULE ORAL DAILY
Qty: 90 CAPSULE | Refills: 1 | Status: SHIPPED | OUTPATIENT
Start: 2021-04-01 | End: 2021-09-13

## 2021-04-01 RX ORDER — NORTRIPTYLINE HYDROCHLORIDE 10 MG/1
CAPSULE ORAL
Qty: 60 CAPSULE | Refills: 5 | Status: SHIPPED | OUTPATIENT
Start: 2021-04-01 | End: 2022-03-16

## 2021-04-16 ENCOUNTER — APPOINTMENT (OUTPATIENT)
Dept: PREADMISSION TESTING | Facility: HOSPITAL | Age: 61
End: 2021-04-16

## 2021-04-16 PROCEDURE — U0004 COV-19 TEST NON-CDC HGH THRU: HCPCS

## 2021-04-16 PROCEDURE — C9803 HOPD COVID-19 SPEC COLLECT: HCPCS

## 2021-04-17 LAB — SARS-COV-2 RNA NOSE QL NAA+PROBE: NOT DETECTED

## 2021-04-26 ENCOUNTER — APPOINTMENT (OUTPATIENT)
Dept: PREADMISSION TESTING | Facility: HOSPITAL | Age: 61
End: 2021-04-26

## 2021-04-26 LAB — SARS-COV-2 RNA PNL SPEC NAA+PROBE: NOT DETECTED

## 2021-04-26 PROCEDURE — U0004 COV-19 TEST NON-CDC HGH THRU: HCPCS

## 2021-04-26 PROCEDURE — C9803 HOPD COVID-19 SPEC COLLECT: HCPCS

## 2021-04-28 ENCOUNTER — OUTSIDE FACILITY SERVICE (OUTPATIENT)
Dept: PAIN MEDICINE | Facility: CLINIC | Age: 61
End: 2021-04-28

## 2021-04-28 PROCEDURE — 99152 MOD SED SAME PHYS/QHP 5/>YRS: CPT | Performed by: ANESTHESIOLOGY

## 2021-04-28 PROCEDURE — 64490 INJ PARAVERT F JNT C/T 1 LEV: CPT | Performed by: ANESTHESIOLOGY

## 2021-04-28 PROCEDURE — 64491 INJ PARAVERT F JNT C/T 2 LEV: CPT | Performed by: ANESTHESIOLOGY

## 2021-04-29 ENCOUNTER — TELEPHONE (OUTPATIENT)
Dept: PAIN MEDICINE | Facility: CLINIC | Age: 61
End: 2021-04-29

## 2021-04-29 NOTE — TELEPHONE ENCOUNTER
Spoke with pt and inquired about how he's was doing after his procedure yesterday with Dr. Hansen. He states that he is doing well, stated he has a headache, and small amount of soreness in neck and back. Reminded them of follow up appointment/ next procedure and placed reminder card in the mail.

## 2021-05-03 DIAGNOSIS — N52.9 ERECTILE DYSFUNCTION, UNSPECIFIED ERECTILE DYSFUNCTION TYPE: ICD-10-CM

## 2021-05-03 RX ORDER — SILDENAFIL 100 MG/1
TABLET, FILM COATED ORAL
Qty: 30 TABLET | Refills: 1 | Status: SHIPPED | OUTPATIENT
Start: 2021-05-03 | End: 2021-07-22

## 2021-05-03 NOTE — TELEPHONE ENCOUNTER
Last Office Visit:  3/8/21  Next Office Visit: 9/13/21    Labs completed in past 6 months? yes  Labs completed in past year? yes    Last Refill Date: 3/8/21  Quantity:30  Refills:1    Pharmacy:     Please review pended refill request for any changes needed on refills or quantities. Thank you!

## 2021-06-15 ENCOUNTER — PRIOR AUTHORIZATION (OUTPATIENT)
Dept: INTERNAL MEDICINE | Facility: CLINIC | Age: 61
End: 2021-06-15

## 2021-06-21 ENCOUNTER — TELEPHONE (OUTPATIENT)
Dept: PAIN MEDICINE | Facility: CLINIC | Age: 61
End: 2021-06-21

## 2021-06-21 NOTE — TELEPHONE ENCOUNTER
Patient was directed to call his insurance to verify it being covered and to call us with the approval or not.

## 2021-06-21 NOTE — TELEPHONE ENCOUNTER
Caller: TALI HECK    Relationship to patient: SELF    Best call back number: [phone]    Type of visit: INJECTION FOLLOW UP    If rescheduling, when is the original appointment: 6/23/21    Additional notes:PATIENT STATES HE CANNOT MAKE IT IN AND WAS TOLD HE COULD CALL AND MAKE THIS A VISIT OVER THE PHONE. PLEASE CALL BACK AND CLARIFY WITH PATIENT

## 2021-07-22 DIAGNOSIS — N52.9 ERECTILE DYSFUNCTION, UNSPECIFIED ERECTILE DYSFUNCTION TYPE: ICD-10-CM

## 2021-07-22 RX ORDER — SILDENAFIL 100 MG/1
TABLET, FILM COATED ORAL
Qty: 30 TABLET | Refills: 0 | Status: SHIPPED | OUTPATIENT
Start: 2021-07-22 | End: 2021-08-18

## 2021-07-22 NOTE — TELEPHONE ENCOUNTER
Last Office Visit: 03/08/21  Next Office Visit: 09/13/21    Labs completed in past 6 months? yes  Labs completed in past year? yes    Last Refill Date: 05/03/21  Quantity:30  Refills:1    Pharmacy:     Please review pended refill request for any changes needed on refills or quantities. Thank you!

## 2021-08-08 NOTE — PROGRESS NOTES
"Chief Complaint: \"My pain got much better after injections.\"       History of Present Illness:  Mr. Philippe Mixon, 61 y.o. male originally referred by Dr. Stephanie Livingston in consultation for chronic intractable neck pain and headaches. Patient reports a 1-2 year history of neck pain, which began without incident. He was last seen on April 28, 2021, when he underwent diagnostic and therapeutic cervical facet joint injections: bilateral C4-C5, bilateral C5-C6 combined with cervicothoracic interspinous bursa injection, from which he reports experiencing almost complete pain relief lasting about eight weeks.  After cervical facet joint injections, and he continues with better range of motion.  He did not participate in physical therapy, because his insurance only allows 70 visits per year, which he is utilizing for his lower back at this time. He returns today for postprocedure follow up and evaluation.  Pain Description: constant pain with intermittent exacerbation, described as aching, stabbing, throbbing, and sharp sensation.   Radiation of Pain: The pain radiates into the occipital region causing headaches  Pain intensity today: 6/10  Average pain intensity last week: 5/10  Pain intensity ranges from: 4/10 to 7/10  Aggravating factors: Pain increases with extension, rotation of the cervical spine   Alleviating factors: Pain decreases with physical therapy   Associated Symptoms:   Patient denies pain, numbness, or weakness in the upper extremities.   Patient denies any new bladder or bowel problems.   Patient reports difficulties with his balance but denies recent falls (related to his chronic back pain).   Patient reports bilateral cervicogenic and occipital headaches several times a week lasting several hours resolved after cervical facet joint injections, they have started to recur.    Review of previous therapies and additional medical records:  Philippe Mixon has already failed the following measures, " including:   Conservative Measures: Oral analgesics, topical analgesics, ice, heat, chiropractic therapy, physical therapy   Interventional Measures: Some injections with Dr. Batres more than 8 years ago  04/28/2021: DxTx cervical facet joint injections: bilateral C4-C5, bilateral C5-C6 combined with cervicothoracic interspinous bursa injection  Surgical Measures: No history of cervical spine or shoulder surgery. No history of lumbar or hip surgery  Philippe Mixon has not undergone orthopedic spine or neurosurgical consultation for chronic pain condition   Philippe Mixon presents with significant comorbidities including RA, hypothyroidism  In terms of current analgesics, Philippe Mixon takes: diclofenac, Flexeril. Patient also takes methotrexate  I have reviewed [facility] Report #[id number] consistent with medication reconciliation.  SOAPP: Low Risk    Global Pain Scale 04-01 2021 08-09 2021         Pain 14 12         Feelings 0 0         Clinical outcomes 10 9         Activities 14 11         GPS Total: 38 32           Review of Diagnostic Studies:   MRI of the brain without contrast September 26, 2020.  Images reveal extensive high T2/FLAIR signal foci throughout the supratentorial white matter.  Findings are nonspecific and could reflect chronic small vessel disease, demyelinating disease, vasculitis, or chronic migraines.  Trace mastoid effusions.  Mild paranasal sinus mucosal thickening  MRI of the cervical spine without contrast September 26, 2020.  Images reveal the posterior fossa image unremarkable.  The cord signal and caliber appear normal.  Diffuse cervical spondylosis.  Axial imaging:  C2-C3: Disc osteophyte complex, central disc protrusion.  Mild bilateral foraminal stenosis  C3-C4: Disc osteophyte complex.  Moderate bilateral foraminal stenosis  C4-C5: Disc osteophyte complex.  Mild bilateral foraminal stenosis  C5-C6: Mild left paracentral disc osteophyte effacing the ventral thecal sac.   Central disc protrusion.  Mild right and moderate to severe left neuroforaminal stenosis  C6-C7: Left paracentral disc protrusion causing mild deformation of the ventral cord contour.  Mild right and moderate left foraminal stenosis  C7-T1: Mild disc osteophyte complex.  No significant canal stenosis.  Mild bilateral foraminal stenosis    Review of Systems   Musculoskeletal: Positive for neck pain and neck stiffness.   All other systems reviewed and are negative.        Patient Active Problem List   Diagnosis   • Esophageal reflux   • Hyperglycemia   • Hypothyroidism   • Vitamin D deficiency   • Duodenitis   • Dyslipidemia   • Family history of prostate cancer in father   • Cervical spondylosis without myelopathy   • DDD (degenerative disc disease), cervical   • Rheumatoid arthritis with positive rheumatoid factor (CMS/HCC)   • Myofascial pain   • Occipital headache   • Insomnia due to medical condition   • Cervicothoracic interspinous bursitis       Past Medical History:   Diagnosis Date   • Abdominal tenderness, epigastric    • Finger injury    • Hypothyroidism          Past Surgical History:   Procedure Laterality Date   • EYE SURGERY      Repair   • EYE SURGERY Left 2018    Cataract Removal   • RETINAL DETACHMENT REPAIR Left 2016   • ROTATOR CUFF REPAIR Left 2010    yomaira jaquez   • ROTATOR CUFF REPAIR Right 2011    Yomaira Jaquez   • SHOULDER ARTHROSCOPY           Family History   Problem Relation Age of Onset   • Arthritis Other    • Cancer Other    • Diabetes Other    • Hypertension Other    • Thyroid disease Other          Social History     Socioeconomic History   • Marital status:      Spouse name: Not on file   • Number of children: Not on file   • Years of education: Not on file   • Highest education level: Not on file   Tobacco Use   • Smoking status: Former Smoker   Substance and Sexual Activity   • Alcohol use: No   • Sexual activity: Defer           Current Outpatient Medications:   •  celecoxib  "(CeleBREX) 200 MG capsule, Take 1 capsule by mouth Daily. Replaces ibuprofen, Disp: 30 capsule, Rfl: 5  •  Cholecalciferol 25 MCG (1000 UT) capsule, Take 1 capsule by mouth Daily., Disp: , Rfl:   •  cyclobenzaprine (FLEXERIL) 10 MG tablet, Take 1 tablet by mouth Every Night., Disp: 30 tablet, Rfl: 3  •  folic acid (FOLVITE) 1 MG tablet, Take 1 tablet by mouth Daily., Disp: , Rfl:   •  levothyroxine (SYNTHROID, LEVOTHROID) 75 MCG tablet, Take 1 tablet by mouth Daily., Disp: 90 tablet, Rfl: 1  •  methotrexate 2.5 MG tablet, Take 6 tablets by mouth 1 (One) Time Per Week., Disp: , Rfl:   •  omeprazole (priLOSEC) 20 MG capsule, Take 1 capsule by mouth Daily., Disp: 30 capsule, Rfl: 5  •  sildenafil (REVATIO) 20 MG tablet, sildenafil (pulmonary hypertension) 20 mg tablet  TAKE 2 5 TABLETS BY MOUTH ONCE DAILY AS NEEDED, Disp: , Rfl:   •  sildenafil (VIAGRA) 100 MG tablet, TAKE 1/2 TO 1 TABLET BY MOUTH DAILY AS NEEDED FOR ERECTILE DYSFUNCTION, Disp: 30 tablet, Rfl: 0  •  Dietary Management Product (Rheumate) capsule, Take 1 capsule by mouth Daily., Disp: 90 capsule, Rfl: 1  •  nortriptyline (PAMELOR) 10 MG capsule, 1-2 tablets at bedtime, Disp: 60 capsule, Rfl: 5      No Known Allergies      /93 (BP Location: Left arm, Patient Position: Sitting, Cuff Size: Adult)   Pulse 57   Temp 96.4 °F (35.8 °C)   Ht 177.8 cm (70\")   Wt 85.7 kg (189 lb)   SpO2 98%   BMI 27.12 kg/m²       Physical Exam:  Constitutional: Patient appears well-developed, well-nourished, well-hydrated  HEENT: Head: Normocephalic and atraumatic  Eyes: Conjunctivae and lids are normal  Pupils: Equal, round, reactive to light  Neck: Trachea normal. Neck supple. No JVD present.   Lymphatic: No cervical adenopathy  Pulmonary: No increased work of breathing or signs of respiratory distress. Auscultation of lungs: Clear to auscultation.   Cardiovascular: Normal rate and rhythm, normal S1 and S2, no murmurs.   Musculoskeletal   Gait and station: Gait " evaluation demonstrated a normal gait. Able to walk on heels, toes, tandem walking   Cervical spine: Passive and active range of motion are improved, with pain. Extension, lateral flexion, rotation of the cervical spine increased and reproduced pain. Cervical facet joint loading maneuvers are positive.  There is no pain upon palpation of the cervicothoracic interspinous bursa today.  Muscles: Presence of active trigger points at the levator scapulae   Shoulders: The range of motion of the glenohumeral joints is full and without pain. Rotator cuff strength is 5/5.   Neurological:   Patient is alert and oriented to person, place, and time.   Speech: Normal.   Cortical function: Normal mental status.   Cranial nerves 2-12: intact.   Reflex Scores:  Right brachioradialis: 2+  Left brachioradialis: 2+  Right biceps: 2+  Left biceps: 2+  Right triceps: 2+  Left triceps: 2+  Right patellar: 2+  Left patellar: 2+  Right Achilles: 2+  Left Achilles: 2+  Motor strength: 5/5  Motor Tone: Normal .   Involuntary movements: None.   Superficial/Primitive Reflexes: Primitive reflexes were absent.   Right Bai: Absent  Left Bai: Absent  Right ankle clonus: Absent  Left ankle clonus: Absent   Babinsky: Absent  Spurling sign: Negative. Neck tornado test: Negative. Lhermitte sign: Negative. Long tract signs: Negative.   Sensory exam: Intact to light touch, intact pain and temperature sensation, intact vibration sensation and normal proprioception.   Coordination: Normal finger to nose and heel to shin. Normal balance and negative Romberg's sign   Skin and subcutaneous tissue: Skin is warm and intact. No rash noted. No cyanosis.   Psychiatric: Judgment and insight: Normal. Orientation to person, place and time: Normal. Recent and remote memory: Intact. Mood and affect: Normal.     I have completed a physical examination and have updated or changed the appropriate documentation from previous visit on 04/01/2021, otherwise physical  exam is unchanged.         ASSESSMENT:   1. Cervical spondylosis without myelopathy    2. Cervicothoracic interspinous bursitis    3. DDD (degenerative disc disease), cervical    4. Occipital headache    5. Myofascial pain    6. Rheumatoid arthritis with positive rheumatoid factor, involving unspecified site (CMS/[facility])        PLAN/MEDICAL DECISION MAKING:  Patient reports a 1-2 year history of neck pain.  MRI of the cervical spine without contrast on September 26, 2020 revealed diffuse cervical spondylosis and multilevel degenerative disc disease, with foraminal stenosis. Patient has been diagnosed with rheumatoid arthritis by Dr. Vega and he's currently taking methotrexate. Patient has failed to obtain pain relief with conservative measures including oral analgesics and physical therapy. I have reviewed all available patient's medical records as well as previous therapies as referenced above. I had a lengthy conversation with Mr. Philippe Mixon regarding his chronic pain condition and potential therapeutic options including risks, benefits, alternative therapies, to name a few. Therefore, I have proposed the following plan:  1. Pharmacological measures: Reviewed and discussed;   A. Patient takes diclofenac, Flexeril. Patient also takes methotrexate  B. Continue nortriptyline 10 mg 1-2 tablets at bedtime  C. Continue Rheumate one tablet twice daily  2. Interventional pain management measures: Patient will be scheduled for diagnostic bilateral cervical medial branch blocks at C4, C5, C6; for bilateral cervical facet joints at C4-C5, C5-C6. We may repeat procedure depending on patient's outcome.  If patient fails to obtain sustained pain relief, then, we will proceed with , to clarify the origin of chronic refractory mechanical/axial cervicalgia. If patient experiences more than 80% pain relief along with significant improvement in the range of motion of the cervical spine, then, patient will be scheduled for a  second set of diagnostic bilateral cervical medial branch blocks, to then, proceed with bilateral cervical medial branch rhizotomies.  Otherwise, we may consider cervical epidural steroid injection by interlaminar approach versus diagnostic and therapeutic bilateral greater occipital nerve blocks by hydrodissection technique under ultrasound and fluoroscopic guidance  3. Long-term rehabilitation efforts:  A. The patient does not have a history of falls. I did complete a risk assessment for falls  B. Patient will start a comprehensive physical therapy program for upper body strengthening/posture correction, core strengthening, gait and balance training, ultrasound, ASTYM, myofascial release, cupping and dry needling   C. Start an exercise program such as yoga, water therapy and swimming  D. Contrast therapy: Apply ice-packs for 15-20 minutes, followed by heating pads for 15-20 minutes to affected area   4. The patient and his family have been instructed to contact my office with any questions or difficulties. The patient understands the plan and agrees to proceed accordingly.        Patient Care Team:  Stephanie Livingston MD as PCP - General     No orders of the defined types were placed in this encounter.        Future Appointments   Date Time Provider Department Center   8/30/2021 10:15 AM Donny Hansen MD [facility]   9/13/2021  8:30 AM Stephanie Livingston MD [facility]   9/15/2021 10:00 AM Obey Ureña MD [facility] [address] [facility]         MORGAN Rosales Dragon/Transcription disclaimer:  Much of this encounter note is an electronic transcription of spoken language to printed text. Electronic transcription of spoken language may permit erroneous, or at times, nonsensical words or phrases to be inadvertently transcribed. Although I have reviewed the note for such errors, some may still exist.

## 2021-08-09 ENCOUNTER — OFFICE VISIT (OUTPATIENT)
Dept: PAIN MEDICINE | Facility: CLINIC | Age: 61
End: 2021-08-09

## 2021-08-09 VITALS
HEART RATE: 57 BPM | OXYGEN SATURATION: 98 % | BODY MASS INDEX: 27.06 KG/M2 | SYSTOLIC BLOOD PRESSURE: 151 MMHG | DIASTOLIC BLOOD PRESSURE: 93 MMHG | HEIGHT: 70 IN | WEIGHT: 189 LBS | TEMPERATURE: 96.4 F

## 2021-08-09 DIAGNOSIS — M50.30 DDD (DEGENERATIVE DISC DISEASE), CERVICAL: ICD-10-CM

## 2021-08-09 DIAGNOSIS — M47.812 CERVICAL SPONDYLOSIS WITHOUT MYELOPATHY: ICD-10-CM

## 2021-08-09 DIAGNOSIS — M05.9 RHEUMATOID ARTHRITIS WITH POSITIVE RHEUMATOID FACTOR, INVOLVING UNSPECIFIED SITE (HCC): ICD-10-CM

## 2021-08-09 DIAGNOSIS — M79.18 MYOFASCIAL PAIN: ICD-10-CM

## 2021-08-09 DIAGNOSIS — M47.812 CERVICAL SPONDYLOSIS WITHOUT MYELOPATHY: Primary | ICD-10-CM

## 2021-08-09 DIAGNOSIS — R51.9 OCCIPITAL HEADACHE: ICD-10-CM

## 2021-08-09 DIAGNOSIS — M71.9 CERVICOTHORACIC INTERSPINOUS BURSITIS: ICD-10-CM

## 2021-08-09 PROCEDURE — 99213 OFFICE O/P EST LOW 20 MIN: CPT | Performed by: NURSE PRACTITIONER

## 2021-08-09 RX ORDER — SILDENAFIL CITRATE 20 MG/1
TABLET ORAL
COMMUNITY
End: 2021-09-13

## 2021-08-18 DIAGNOSIS — N52.9 ERECTILE DYSFUNCTION, UNSPECIFIED ERECTILE DYSFUNCTION TYPE: ICD-10-CM

## 2021-08-18 RX ORDER — SILDENAFIL 100 MG/1
TABLET, FILM COATED ORAL
Qty: 30 TABLET | Refills: 3 | Status: SHIPPED | OUTPATIENT
Start: 2021-08-18 | End: 2021-12-27

## 2021-08-20 DIAGNOSIS — M47.812 CERVICAL SPONDYLOSIS WITHOUT MYELOPATHY: Primary | ICD-10-CM

## 2021-08-31 ENCOUNTER — TELEPHONE (OUTPATIENT)
Dept: PAIN MEDICINE | Facility: CLINIC | Age: 61
End: 2021-08-31

## 2021-08-31 RX ORDER — SODIUM, POTASSIUM,MAG SULFATES 17.5-3.13G
2 SOLUTION, RECONSTITUTED, ORAL ORAL TAKE AS DIRECTED
Qty: 354 ML | Refills: 0 | Status: SHIPPED | OUTPATIENT
Start: 2021-08-31 | End: 2021-09-13

## 2021-08-31 NOTE — TELEPHONE ENCOUNTER
Caller: TALI HECK    Relationship: SELF    Best call back number: [phone]    What is the best time to reach you: ANY    Who are you requesting to speak with (clinical staff, provider,  specific staff member): ANY      What was the call regarding: PATIENT IS CALLING TO GET INFORMATION ON HIS PROCEDURE TOMORROW. WHERE TO ARRIVE AND WHAT TIME    Do you require a callback: YES

## 2021-09-01 ENCOUNTER — OUTSIDE FACILITY SERVICE (OUTPATIENT)
Dept: PAIN MEDICINE | Facility: CLINIC | Age: 61
End: 2021-09-01

## 2021-09-01 DIAGNOSIS — M47.812 CERVICAL SPONDYLOSIS WITHOUT MYELOPATHY: Primary | ICD-10-CM

## 2021-09-01 PROCEDURE — 64490 INJ PARAVERT F JNT C/T 1 LEV: CPT | Performed by: ANESTHESIOLOGY

## 2021-09-01 PROCEDURE — 64491 INJ PARAVERT F JNT C/T 2 LEV: CPT | Performed by: ANESTHESIOLOGY

## 2021-09-01 PROCEDURE — 99152 MOD SED SAME PHYS/QHP 5/>YRS: CPT | Performed by: ANESTHESIOLOGY

## 2021-09-02 ENCOUNTER — TELEPHONE (OUTPATIENT)
Dept: PAIN MEDICINE | Facility: CLINIC | Age: 61
End: 2021-09-02

## 2021-09-02 NOTE — TELEPHONE ENCOUNTER
Spoke with pt regarding yesterday’s procedure with Dr. Hansen. Pt stated they are doing well, with no complaints.  Advised of next procedure.Pt requested to be called back to discuss procedure dates.      Spoke with pt, procedure date of 9/20/21 will work.

## 2021-09-13 ENCOUNTER — HOSPITAL ENCOUNTER (OUTPATIENT)
Dept: GENERAL RADIOLOGY | Facility: HOSPITAL | Age: 61
Discharge: HOME | End: 2021-09-13

## 2021-09-13 ENCOUNTER — OFFICE VISIT (OUTPATIENT)
Dept: INTERNAL MEDICINE | Facility: CLINIC | Age: 61
End: 2021-09-13

## 2021-09-13 ENCOUNTER — LAB (OUTPATIENT)
Dept: LAB | Facility: HOSPITAL | Age: 61
End: 2021-09-13

## 2021-09-13 VITALS
DIASTOLIC BLOOD PRESSURE: 64 MMHG | SYSTOLIC BLOOD PRESSURE: 102 MMHG | BODY MASS INDEX: 26.37 KG/M2 | HEART RATE: 77 BPM | OXYGEN SATURATION: 97 % | WEIGHT: 184.2 LBS | HEIGHT: 70 IN

## 2021-09-13 DIAGNOSIS — G89.29 CHRONIC PAIN OF MULTIPLE JOINTS: ICD-10-CM

## 2021-09-13 DIAGNOSIS — E03.8 OTHER SPECIFIED HYPOTHYROIDISM: ICD-10-CM

## 2021-09-13 DIAGNOSIS — K21.00 GASTROESOPHAGEAL REFLUX DISEASE WITH ESOPHAGITIS WITHOUT HEMORRHAGE: ICD-10-CM

## 2021-09-13 DIAGNOSIS — M79.651 BILATERAL THIGH PAIN: ICD-10-CM

## 2021-09-13 DIAGNOSIS — M77.12 LATERAL EPICONDYLITIS OF LEFT ELBOW: ICD-10-CM

## 2021-09-13 DIAGNOSIS — R73.01 IFG (IMPAIRED FASTING GLUCOSE): ICD-10-CM

## 2021-09-13 DIAGNOSIS — M05.9 RHEUMATOID ARTHRITIS WITH POSITIVE RHEUMATOID FACTOR, INVOLVING UNSPECIFIED SITE (HCC): ICD-10-CM

## 2021-09-13 DIAGNOSIS — Z80.1 FAMILY HISTORY OF LUNG CANCER: ICD-10-CM

## 2021-09-13 DIAGNOSIS — M17.11 PRIMARY OSTEOARTHRITIS OF RIGHT KNEE: Primary | ICD-10-CM

## 2021-09-13 DIAGNOSIS — M79.652 BILATERAL THIGH PAIN: ICD-10-CM

## 2021-09-13 DIAGNOSIS — M17.11 PRIMARY OSTEOARTHRITIS OF RIGHT KNEE: ICD-10-CM

## 2021-09-13 DIAGNOSIS — M25.50 CHRONIC PAIN OF MULTIPLE JOINTS: ICD-10-CM

## 2021-09-13 LAB
BASOPHILS # BLD AUTO: 0.05 10*3/MM3 (ref 0–0.2)
BASOPHILS NFR BLD AUTO: 1 % (ref 0–1.5)
DEPRECATED RDW RBC AUTO: 44.5 FL (ref 37–54)
EOSINOPHIL # BLD AUTO: 0.09 10*3/MM3 (ref 0–0.4)
EOSINOPHIL NFR BLD AUTO: 1.8 % (ref 0.3–6.2)
ERYTHROCYTE [DISTWIDTH] IN BLOOD BY AUTOMATED COUNT: 13.1 % (ref 12.3–15.4)
ERYTHROCYTE [SEDIMENTATION RATE] IN BLOOD: 4 MM/HR (ref 0–20)
HCT VFR BLD AUTO: 47.1 % (ref 37.5–51)
HGB BLD-MCNC: 15.3 G/DL (ref 13–17.7)
IMM GRANULOCYTES # BLD AUTO: 0.01 10*3/MM3 (ref 0–0.05)
IMM GRANULOCYTES NFR BLD AUTO: 0.2 % (ref 0–0.5)
LYMPHOCYTES # BLD AUTO: 1.38 10*3/MM3 (ref 0.7–3.1)
LYMPHOCYTES NFR BLD AUTO: 26.9 % (ref 19.6–45.3)
MCH RBC QN AUTO: 30.6 PG (ref 26.6–33)
MCHC RBC AUTO-ENTMCNC: 32.5 G/DL (ref 31.5–35.7)
MCV RBC AUTO: 94.2 FL (ref 79–97)
MONOCYTES # BLD AUTO: 0.5 10*3/MM3 (ref 0.1–0.9)
MONOCYTES NFR BLD AUTO: 9.7 % (ref 5–12)
NEUTROPHILS NFR BLD AUTO: 3.1 10*3/MM3 (ref 1.7–7)
NEUTROPHILS NFR BLD AUTO: 60.4 % (ref 42.7–76)
NRBC BLD AUTO-RTO: 0 /100 WBC (ref 0–0.2)
PLATELET # BLD AUTO: 291 10*3/MM3 (ref 140–450)
PMV BLD AUTO: 9.8 FL (ref 6–12)
RBC # BLD AUTO: 5 10*6/MM3 (ref 4.14–5.8)
WBC # BLD AUTO: 5.13 10*3/MM3 (ref 3.4–10.8)

## 2021-09-13 PROCEDURE — 71046 X-RAY EXAM CHEST 2 VIEWS: CPT

## 2021-09-13 PROCEDURE — 73552 X-RAY EXAM OF FEMUR 2/>: CPT

## 2021-09-13 PROCEDURE — 85652 RBC SED RATE AUTOMATED: CPT

## 2021-09-13 PROCEDURE — 99214 OFFICE O/P EST MOD 30 MIN: CPT | Performed by: INTERNAL MEDICINE

## 2021-09-13 PROCEDURE — 84443 ASSAY THYROID STIM HORMONE: CPT

## 2021-09-13 PROCEDURE — 80061 LIPID PANEL: CPT

## 2021-09-13 PROCEDURE — 80053 COMPREHEN METABOLIC PANEL: CPT

## 2021-09-13 PROCEDURE — 84439 ASSAY OF FREE THYROXINE: CPT

## 2021-09-13 PROCEDURE — 83036 HEMOGLOBIN GLYCOSYLATED A1C: CPT

## 2021-09-13 PROCEDURE — 85025 COMPLETE CBC W/AUTO DIFF WBC: CPT

## 2021-09-13 RX ORDER — LEVOTHYROXINE SODIUM 0.07 MG/1
75 TABLET ORAL DAILY
Qty: 90 TABLET | Refills: 1 | Status: SHIPPED | OUTPATIENT
Start: 2021-09-13 | End: 2022-03-16 | Stop reason: SDUPTHER

## 2021-09-13 RX ORDER — PREDNISONE 10 MG/1
TABLET ORAL
Qty: 12 TABLET | Refills: 0 | Status: SHIPPED | OUTPATIENT
Start: 2021-09-13 | End: 2021-11-11

## 2021-09-13 RX ORDER — CELECOXIB 200 MG/1
200 CAPSULE ORAL DAILY
Qty: 30 CAPSULE | Refills: 5 | Status: SHIPPED | OUTPATIENT
Start: 2021-09-13 | End: 2022-03-16 | Stop reason: SDUPTHER

## 2021-09-13 RX ORDER — OMEPRAZOLE 20 MG/1
20 CAPSULE, DELAYED RELEASE ORAL DAILY
Qty: 30 CAPSULE | Refills: 5 | Status: SHIPPED | OUTPATIENT
Start: 2021-09-13 | End: 2022-03-16 | Stop reason: SDUPTHER

## 2021-09-13 RX ORDER — CYCLOBENZAPRINE HCL 10 MG
10 TABLET ORAL NIGHTLY
Qty: 30 TABLET | Refills: 3 | Status: SHIPPED | OUTPATIENT
Start: 2021-09-13 | End: 2022-03-16

## 2021-09-13 NOTE — PROGRESS NOTES
Hypothyroidism, Headache, and Knee Pain (right)    Subjective   Philippe Mixon is a 61 y.o. male is here today for follow-up.    History of Present Illness   Getting shots in his neck and back.  Now his right knee pain and left elbow pain, trouble walking.  Sister recently passed away with metastatic lung cancer, chronic smoker.  He is a prior smoker- > 25 - 30 years ago.  Brother was a smoker and  of lung cancer.  Sister had mets to her left femur. And he is concerned    Current Outpatient Medications:   •  celecoxib (CeleBREX) 200 MG capsule, Take 1 capsule by mouth Daily. Replaces ibuprofen, Disp: 30 capsule, Rfl: 5  •  Cholecalciferol 25 MCG (1000 UT) capsule, Take 1 capsule by mouth Daily., Disp: , Rfl:   •  cyclobenzaprine (FLEXERIL) 10 MG tablet, Take 1 tablet by mouth Every Night., Disp: 30 tablet, Rfl: 3  •  folic acid (FOLVITE) 1 MG tablet, Take 1 tablet by mouth Daily., Disp: , Rfl:   •  levothyroxine (SYNTHROID, LEVOTHROID) 75 MCG tablet, Take 1 tablet by mouth Daily., Disp: 90 tablet, Rfl: 1  •  methotrexate 2.5 MG tablet, Take 6 tablets by mouth 1 (One) Time Per Week., Disp: , Rfl:   •  nortriptyline (PAMELOR) 10 MG capsule, 1-2 tablets at bedtime, Disp: 60 capsule, Rfl: 5  •  omeprazole (priLOSEC) 20 MG capsule, Take 1 capsule by mouth Daily., Disp: 30 capsule, Rfl: 5  •  sildenafil (VIAGRA) 100 MG tablet, TAKE 1/2 TO 1 TABLET BY MOUTH DAILY AS NEEDED FOR ERECTILE DYSFUNCTION, Disp: 30 tablet, Rfl: 3  •  predniSONE (DELTASONE) 10 MG tablet, Take 3 tabs daily x 2 days then 2 tabs daily x 2 days then 1 tab daily x 2 days then stop., Disp: 12 tablet, Rfl: 0      The following portions of the patient's history were reviewed and updated as appropriate: allergies, current medications, past family history, past medical history, past social history, past surgical history and problem list.    Review of Systems   Constitutional: Negative.  Negative for chills and fever.   HENT: Negative for ear discharge,  "ear pain, sinus pressure and sore throat.    Respiratory: Negative for cough, chest tightness and shortness of breath.    Cardiovascular: Negative for chest pain, palpitations and leg swelling.   Gastrointestinal: Negative for diarrhea, nausea and vomiting.   Musculoskeletal: Positive for arthralgias and myalgias (left elbow pain). Negative for back pain and gait problem.   Neurological: Negative for dizziness, syncope and headaches.   Psychiatric/Behavioral: Negative for confusion and sleep disturbance.       Objective   /64   Pulse 77   Ht 177.8 cm (70\")   Wt 83.6 kg (184 lb 3.2 oz)   SpO2 97% Comment: ra  BMI 26.43 kg/m²   Physical Exam  Vitals and nursing note reviewed.   Constitutional:       Appearance: He is well-developed.   HENT:      Head: Normocephalic and atraumatic.      Mouth/Throat:      Pharynx: No oropharyngeal exudate.   Eyes:      Conjunctiva/sclera: Conjunctivae normal.      Pupils: Pupils are equal, round, and reactive to light.   Neck:      Thyroid: No thyromegaly.   Cardiovascular:      Rate and Rhythm: Normal rate and regular rhythm.      Pulses: Normal pulses.      Heart sounds: Normal heart sounds. No murmur heard.   No friction rub. No gallop.    Pulmonary:      Effort: Pulmonary effort is normal.      Breath sounds: Normal breath sounds.   Abdominal:      General: Bowel sounds are normal. There is no distension.      Palpations: Abdomen is soft.      Tenderness: There is no abdominal tenderness.   Musculoskeletal:      Cervical back: Neck supple.   Skin:     General: Skin is warm and dry.   Neurological:      Mental Status: He is alert and oriented to person, place, and time.      Cranial Nerves: No cranial nerve deficit.   Psychiatric:         Judgment: Judgment normal.           Results for orders placed or performed in visit on 04/26/21   COVID-19, M,T,W, [facility] IN-HOUSE NP/OP SWAB IN UTM/VTM/[facility] [employer] MED[facility] 24HR TAT - Swab, Nasopharynx    Specimen: " Nasopharynx; Swab   Result Value Ref Range    COVID19 Not Detected Not Detected - Ref. Range             Assessment/Plan   Diagnoses and all orders for this visit:    Primary osteoarthritis of right knee  -     XR Knee 1 or 2 View Right; Future    Chronic pain of multiple joints  -     celecoxib (CeleBREX) 200 MG capsule; Take 1 capsule by mouth Daily. Replaces ibuprofen  -     cyclobenzaprine (FLEXERIL) 10 MG tablet; Take 1 tablet by mouth Every Night.    Other specified hypothyroidism  Comments:  stable, continue current regimen.  Orders:  -     levothyroxine (SYNTHROID, LEVOTHROID) 75 MCG tablet; Take 1 tablet by mouth Daily.  -     TSH; Future  -     T4, Free; Future    Gastroesophageal reflux disease with esophagitis without hemorrhage  -     omeprazole (priLOSEC) 20 MG capsule; Take 1 capsule by mouth Daily.  -     CBC & Differential; Future  -     Comprehensive Metabolic Panel; Future    Lateral epicondylitis of left elbow  -     predniSONE (DELTASONE) 10 MG tablet; Take 3 tabs daily x 2 days then 2 tabs daily x 2 days then 1 tab daily x 2 days then stop.    Family history of lung cancer  -     XR Chest PA & Lateral    IFG (impaired fasting glucose)  -     Hemoglobin A1c; Future  -     Lipid Panel; Future    Rheumatoid arthritis with positive rheumatoid factor, involving unspecified site (CMS/HCC)  -     CBC & Differential; Future  -     Sedimentation Rate; Future    Bilateral thigh pain  -     XR femur 2 vw bilateral; Future                 Return in about 6 months (around 3/13/2022) for Annual.    Electronically signed by:    Stephanie Livingston MD

## 2021-09-14 LAB
ALBUMIN SERPL-MCNC: 4.5 G/DL (ref 3.5–5.2)
ALBUMIN/GLOB SERPL: 1.7 G/DL
ALP SERPL-CCNC: 67 U/L (ref 39–117)
ALT SERPL W P-5'-P-CCNC: 30 U/L (ref 1–41)
ANION GAP SERPL CALCULATED.3IONS-SCNC: 7.2 MMOL/L (ref 5–15)
AST SERPL-CCNC: 33 U/L (ref 1–40)
BILIRUB SERPL-MCNC: 0.4 MG/DL (ref 0–1.2)
BUN SERPL-MCNC: 17 MG/DL (ref 8–23)
BUN/CREAT SERPL: 19.3 (ref 7–25)
CALCIUM SPEC-SCNC: 9.2 MG/DL (ref 8.6–10.5)
CHLORIDE SERPL-SCNC: 106 MMOL/L (ref 98–107)
CHOLEST SERPL-MCNC: 123 MG/DL (ref 0–200)
CO2 SERPL-SCNC: 26.8 MMOL/L (ref 22–29)
CREAT SERPL-MCNC: 0.88 MG/DL (ref 0.76–1.27)
GFR SERPL CREATININE-BSD FRML MDRD: 88 ML/MIN/1.73
GLOBULIN UR ELPH-MCNC: 2.6 GM/DL
GLUCOSE SERPL-MCNC: 92 MG/DL (ref 65–99)
HBA1C MFR BLD: 5.4 % (ref 4.8–5.6)
HDLC SERPL-MCNC: 66 MG/DL (ref 40–60)
LDLC SERPL CALC-MCNC: 45 MG/DL (ref 0–100)
LDLC/HDLC SERPL: 0.72 {RATIO}
POTASSIUM SERPL-SCNC: 4.6 MMOL/L (ref 3.5–5.2)
PROT SERPL-MCNC: 7.1 G/DL (ref 6–8.5)
SODIUM SERPL-SCNC: 140 MMOL/L (ref 136–145)
T4 FREE SERPL-MCNC: 1.34 NG/DL (ref 0.93–1.7)
TRIGL SERPL-MCNC: 49 MG/DL (ref 0–150)
TSH SERPL DL<=0.05 MIU/L-ACNC: 1.41 UIU/ML (ref 0.27–4.2)
VLDLC SERPL-MCNC: 12 MG/DL (ref 5–40)

## 2021-09-15 ENCOUNTER — OUTSIDE FACILITY SERVICE (OUTPATIENT)
Dept: GASTROENTEROLOGY | Facility: CLINIC | Age: 61
End: 2021-09-15

## 2021-09-15 PROCEDURE — 45378 DIAGNOSTIC COLONOSCOPY: CPT | Performed by: INTERNAL MEDICINE

## 2021-09-16 ENCOUNTER — TELEPHONE (OUTPATIENT)
Dept: INTERNAL MEDICINE | Facility: CLINIC | Age: 61
End: 2021-09-16

## 2021-09-16 NOTE — TELEPHONE ENCOUNTER
Left message for patient regarding normal xray and need for completion of knee xray.  Advised to call with any questions..  Office Phone number given

## 2021-09-16 NOTE — TELEPHONE ENCOUNTER
Per radiology report, his joints looked normal and joint spaces are preserved, which would be his hips and knees.    If his knee continues to hurt, I will send him to Orthopedic specialist as he may have moderate arthritis that may need additional imaging.

## 2021-09-16 NOTE — TELEPHONE ENCOUNTER
----- Message from Stephanie Livingston MD sent at 9/15/2021 11:33 PM EDT -----  His thigh/ femur xrays are normal.  Knee xray was not done.  The order is still pending.

## 2021-09-16 NOTE — TELEPHONE ENCOUNTER
Spoke with patient and patient states xray tech told him that she would do the xray of the femur and that he would not need the knee xray because the picture would  both.  Please advise.

## 2021-09-16 NOTE — TELEPHONE ENCOUNTER
Patient advised of results, he will continue with the prednisone until it's finished and if no relief afterwards he will call back and let us know if he wants to proceed with Ortho

## 2021-09-27 DIAGNOSIS — M47.812 CERVICAL SPONDYLOSIS WITHOUT MYELOPATHY: Primary | ICD-10-CM

## 2021-10-04 ENCOUNTER — OUTSIDE FACILITY SERVICE (OUTPATIENT)
Dept: PAIN MEDICINE | Facility: CLINIC | Age: 61
End: 2021-10-04

## 2021-10-04 DIAGNOSIS — M47.812 CERVICAL SPONDYLOSIS WITHOUT MYELOPATHY: Primary | ICD-10-CM

## 2021-10-04 PROCEDURE — 64490 INJ PARAVERT F JNT C/T 1 LEV: CPT | Performed by: ANESTHESIOLOGY

## 2021-10-04 PROCEDURE — 99152 MOD SED SAME PHYS/QHP 5/>YRS: CPT | Performed by: ANESTHESIOLOGY

## 2021-10-04 PROCEDURE — 64491 INJ PARAVERT F JNT C/T 2 LEV: CPT | Performed by: ANESTHESIOLOGY

## 2021-10-05 ENCOUNTER — TELEPHONE (OUTPATIENT)
Dept: PAIN MEDICINE | Facility: CLINIC | Age: 61
End: 2021-10-05

## 2021-10-05 NOTE — TELEPHONE ENCOUNTER
PANDAM informing the patient that we were calling him to see how he was doing after his procedure yesterday with Dr. Hansen. Office number was provided so that he can call with any questions or concerns. Also asked if he would call us back so that we may go over his next injection date and time which is October 25th with an arrival time of 1000 AM. Reminder informations mailed to the patient.

## 2021-10-05 NOTE — TELEPHONE ENCOUNTER
LVM for pt regarding how they’re feeling after yesterday’s procedure with Dr. Hansen. Advised of procedure date and time. Also advised nothing to eat or drink the night prior, must have a  and that as of right now the  can come in, and that the procedure is in the [address] floor. Advised pt to contact us if needed.

## 2021-10-11 ENCOUNTER — TELEPHONE (OUTPATIENT)
Dept: PAIN MEDICINE | Facility: CLINIC | Age: 61
End: 2021-10-11

## 2021-10-11 NOTE — TELEPHONE ENCOUNTER
LVM for pt regarding r/s his procedure. Advised will call back later today or possibly tomorrow with new appointment.

## 2021-10-11 NOTE — TELEPHONE ENCOUNTER
Caller: TALI HECK    Relationship to patient: SELF    Best call back number:  [phone]    Chief complaint:  PATIENT IS SCHEDULED FOR OUT-PATIENT INJECTION PROCEDURE ON 10/25 AND NEEDS TO RESCHEDULE THE APPT.    Type of visit: INJECTION    If rescheduling, when is the original appointment:  10/25/21

## 2021-10-12 ENCOUNTER — TELEPHONE (OUTPATIENT)
Dept: PAIN MEDICINE | Facility: CLINIC | Age: 61
End: 2021-10-12

## 2021-10-12 NOTE — TELEPHONE ENCOUNTER
Caller: TALI HECK    Relationship to patient: SELF    Best call back number:  [phone]    Chief complaint: PATIENT CANCELLED SOME INJECTION APPT'S. AND NEEDS TO GET RESCHEDULED FOR A DIFFERENT  DAY.    Type of visit:  INJECTIONS

## 2021-10-22 DIAGNOSIS — M47.812 CERVICAL SPONDYLOSIS WITHOUT MYELOPATHY: Primary | ICD-10-CM

## 2021-10-27 ENCOUNTER — OUTSIDE FACILITY SERVICE (OUTPATIENT)
Dept: PAIN MEDICINE | Facility: CLINIC | Age: 61
End: 2021-10-27

## 2021-10-27 PROCEDURE — 64633 DESTROY CERV/THOR FACET JNT: CPT | Performed by: ANESTHESIOLOGY

## 2021-10-27 PROCEDURE — 99152 MOD SED SAME PHYS/QHP 5/>YRS: CPT | Performed by: ANESTHESIOLOGY

## 2021-10-27 PROCEDURE — 64634 DESTROY C/TH FACET JNT ADDL: CPT | Performed by: ANESTHESIOLOGY

## 2021-10-28 ENCOUNTER — TELEPHONE (OUTPATIENT)
Dept: PAIN MEDICINE | Facility: CLINIC | Age: 61
End: 2021-10-28

## 2021-10-28 NOTE — TELEPHONE ENCOUNTER
Spoke with pt regarding yesterday’s procedure with Dr Haque. Pt stated they were feeling well, but experiencing a lot of neck stiffness/soreness. Advised pt that he can use contrast therapy, tylenol/ibuprofen. Advised pt of follow up appointment, reminder card in the mail.

## 2021-10-29 ENCOUNTER — TELEPHONE (OUTPATIENT)
Dept: INTERNAL MEDICINE | Facility: CLINIC | Age: 61
End: 2021-10-29

## 2021-10-29 NOTE — TELEPHONE ENCOUNTER
PT called to stated the pills prescribed by Dr. Alvarado have not worked. PT says he was instructed to call about their effectiveness.   Pt also wants referral for knee pain. PT has seen Dr. Hansen and referred to Physical Therapy by that office.  Confirmed PT's phone number and preferred pharmacy.  Please call.

## 2021-10-29 NOTE — TELEPHONE ENCOUNTER
Pt states that prednisone did not help with knee pain.  Pt has not been referred to PT, but would like a referral for his right knee.  Please advise.

## 2021-10-30 NOTE — TELEPHONE ENCOUNTER
Clark Martinez , I misunderstood.  PT referral placed. Pt will be called re: same.    Malissa, please cancel the ortho referral. I cancelled the order on my end, not sure if that's enough.

## 2021-11-11 ENCOUNTER — OFFICE VISIT (OUTPATIENT)
Dept: ORTHOPEDIC SURGERY | Facility: CLINIC | Age: 61
End: 2021-11-11

## 2021-11-11 VITALS
DIASTOLIC BLOOD PRESSURE: 70 MMHG | WEIGHT: 180 LBS | BODY MASS INDEX: 25.77 KG/M2 | HEIGHT: 70 IN | SYSTOLIC BLOOD PRESSURE: 122 MMHG

## 2021-11-11 DIAGNOSIS — M25.561 RIGHT KNEE PAIN, UNSPECIFIED CHRONICITY: Primary | ICD-10-CM

## 2021-11-11 DIAGNOSIS — M17.11 PRIMARY OSTEOARTHRITIS OF RIGHT KNEE: ICD-10-CM

## 2021-11-11 PROCEDURE — 99204 OFFICE O/P NEW MOD 45 MIN: CPT | Performed by: ORTHOPAEDIC SURGERY

## 2021-11-11 NOTE — PROGRESS NOTES
[facility] [place] [facility] [place]        Subjective     Pain of the Right Knee      HPI    Philippe Mixon is a 61 y.o. male who presents with new problem of: right knee pain.  Onset: atraumatic and gradual in nature. The issue has been ongoing for 8 month(s). Pain is a 7/10 on the pain scale. Pain is described as dull, aching, burning, throbbing, stabbing and shooting. Associated symptoms include pain and swelling. The pain is worse with walking, standing, sitting, climbing stairs, sleeping and working; pain medication and/or NSAID improve the pain. Previous treatments have included: nothing.    I have reviewed the following portions of the patient's history:History of Present Illness and review of systems.        Patient is here for new problem day regarding his right knee.  This has been going on for the last 8 months or so.  Had some thigh numbness and some low back pain that was helped with an injection.  His knee pain has persisted.  He has difficulty with twisting.  He has mechanical disturbance in the knee.  He hurts at the medial joint line.  He is here at the request of Dr. Livingston.    Past Medical History:   Diagnosis Date   • Abdominal tenderness, epigastric    • Finger injury    • Hypothyroidism       Past Surgical History:   Procedure Laterality Date   • EYE SURGERY      Repair   • EYE SURGERY Left 2018    Cataract Removal   • RETINAL DETACHMENT REPAIR Left 2016   • ROTATOR CUFF REPAIR Left 2010    yomaira jaquez   • ROTATOR CUFF REPAIR Right 2011    Yomaira Jaquez   • SHOULDER ARTHROSCOPY        Family History   Problem Relation Age of Onset   • Arthritis Other    • Cancer Other    • Diabetes Other    • Hypertension Other    • Thyroid disease Other      Social History     Socioeconomic History   • Marital status:    Tobacco Use   • Smoking status: Former Smoker   • Smokeless tobacco: Never Used   Vaping Use   • Vaping Use: Never used   Substance and Sexual Activity   • Alcohol use: No   •  "Sexual activity: Defer      Current Outpatient Medications on File Prior to Visit   Medication Sig Dispense Refill   • celecoxib (CeleBREX) 200 MG capsule Take 1 capsule by mouth Daily. Replaces ibuprofen 30 capsule 5   • Cholecalciferol 25 MCG (1000 UT) capsule Take 1 capsule by mouth Daily.     • cyclobenzaprine (FLEXERIL) 10 MG tablet Take 1 tablet by mouth Every Night. 30 tablet 3   • folic acid (FOLVITE) 1 MG tablet Take 1 tablet by mouth Daily.     • levothyroxine (SYNTHROID, LEVOTHROID) 75 MCG tablet Take 1 tablet by mouth Daily. 90 tablet 1   • methotrexate 2.5 MG tablet Take 6 tablets by mouth 1 (One) Time Per Week.     • nortriptyline (PAMELOR) 10 MG capsule 1-2 tablets at bedtime 60 capsule 5   • omeprazole (priLOSEC) 20 MG capsule Take 1 capsule by mouth Daily. 30 capsule 5   • sildenafil (VIAGRA) 100 MG tablet TAKE 1/2 TO 1 TABLET BY MOUTH DAILY AS NEEDED FOR ERECTILE DYSFUNCTION 30 tablet 3   • [DISCONTINUED] predniSONE (DELTASONE) 10 MG tablet Take 3 tabs daily x 2 days then 2 tabs daily x 2 days then 1 tab daily x 2 days then stop. 12 tablet 0     No current facility-administered medications on file prior to visit.      No Known Allergies       Review of Systems   Constitutional: Negative.    HENT: Negative.    Eyes: Negative.    Respiratory: Negative.    Cardiovascular: Negative.    Gastrointestinal: Negative.    Endocrine: Negative.    Genitourinary: Negative.    Musculoskeletal: Positive for arthralgias.   Skin: Negative.    Allergic/Immunologic: Negative.    Neurological: Negative.    Hematological: Negative.    Psychiatric/Behavioral: Negative.         I reviewed the patient's chief complaint, history of present illness, review of systems, past medical history, surgical history, family history, social history, medications and allergy list.        Objective      Physical Exam  /70   Ht 177.8 cm (70\")   Wt 81.6 kg (180 lb)   BMI 25.83 kg/m²     Body mass index is 25.83 " kg/m².    General  Mental Status - alert  General Appearance - cooperative, well groomed, not in acute distress  Orientation - Oriented X3  Build & Nutrition - well developed and well nourished  Posture - normal posture  Gait - normal gait       Ortho Exam      Musculoskeletal:  Global Assessment:  Overall assessment of Lower Extremity Muscle Strength and Tone:    Right quadriceps--5/5  Right hamstrings--5/5  Right tibialis anterior--5/5  Right gastroc soleus--5/5  Right EHL--5/5    Lower Extremity:    Right knee:  Tenderness:  Over medial joint line  Effusion:  1+  Crepitus:  none  Pulses:  2+  Ecchymosis:  None  Warmth:  None     ROM:  Right:  Extension:0    Flexion:130    Instability:      Right:  Lachman Test:  Negative  Varus stress test negative  Valgus stress test negative   Anterior Drawer Test:  Negative  Posterior Drawer Test:  Negative    Functional Testing:  Right:  Nancy's test:  Positive  Patella grind test:  Negative  Q-angle:  Normal  Apprehension Sign:  Negative      Imaging/Studies  Imaging Results (Last 24 Hours)     Procedure Component Value Units Date/Time    XR Knee 4+ View Right [[id number]] Resulted: 11/11/21 1124     Updated: 11/11/21 1124    Narrative:      Knee X-Ray    Indication: Pain    Study:  Upright AP, Skiers, Lateral, and Sunrise views of Right knee(s)    Comparison: None    Findings:    Patient appears to have mild degenerative changes in the medial   compartment.  There is an early osteophyte seen off the medial aspect of   the medial tibial plateau.  There are mild degenerative changes in the lateral compartment.    There are mild changes in the patellofemoral compartment.    Patient has overall varus alignment.    Kellgren-Fred ndGndrndanddndend:nd nd2nd Impression:   Mild medial compartment and patellofemoral compartment degnerative changes   of the knee           We have also reviewed a femur x-ray from 9/12/2021.  Our interpretation is of no acute bony abnormality.  No significant  hip arthritis noted.    Assessment    Assessment:  1. Right knee pain, unspecified chronicity    2. Primary osteoarthritis of right knee        Plan:  1. Continue over-the-counter medication as needed for discomfort  2. Right knee pain in the face of mild osteoarthritis--patient symptoms are most consistent with a medial meniscal tear.  Plan would be for an MRI to further evaluate the medial meniscus and medial sided knee structures.  I will see him back to review that study and we will see if there is any role for arthroscopy going forward.        Stevie Horan MD  11/11/21  13:23 EST      Dictated Utilizing Dragon Dictation.

## 2021-11-18 ENCOUNTER — OFFICE VISIT (OUTPATIENT)
Dept: ORTHOPEDIC SURGERY | Facility: CLINIC | Age: 61
End: 2021-11-18

## 2021-11-18 ENCOUNTER — HOSPITAL ENCOUNTER (OUTPATIENT)
Dept: MRI IMAGING | Facility: HOSPITAL | Age: 61
Discharge: HOME | End: 2021-11-18
Admitting: ORTHOPAEDIC SURGERY

## 2021-11-18 VITALS
SYSTOLIC BLOOD PRESSURE: 138 MMHG | WEIGHT: 179.9 LBS | HEIGHT: 70 IN | HEART RATE: 58 BPM | BODY MASS INDEX: 25.75 KG/M2 | DIASTOLIC BLOOD PRESSURE: 75 MMHG

## 2021-11-18 DIAGNOSIS — M25.561 RIGHT KNEE PAIN, UNSPECIFIED CHRONICITY: Primary | ICD-10-CM

## 2021-11-18 DIAGNOSIS — S83.241D TEAR OF MEDIAL MENISCUS OF RIGHT KNEE, CURRENT, UNSPECIFIED TEAR TYPE, SUBSEQUENT ENCOUNTER: ICD-10-CM

## 2021-11-18 DIAGNOSIS — M17.11 PRIMARY OSTEOARTHRITIS OF RIGHT KNEE: ICD-10-CM

## 2021-11-18 DIAGNOSIS — M25.561 RIGHT KNEE PAIN, UNSPECIFIED CHRONICITY: ICD-10-CM

## 2021-11-18 PROCEDURE — 73721 MRI JNT OF LWR EXTRE W/O DYE: CPT

## 2021-11-18 PROCEDURE — 99214 OFFICE O/P EST MOD 30 MIN: CPT | Performed by: ORTHOPAEDIC SURGERY

## 2021-11-18 NOTE — PROGRESS NOTES
"    [facility] – [place] [facility] [place]        Subjective     CC: Follow-up (f/u after MRI KNEE RIGHT WO CONTRAST-11/18/21)      ADALBERTO Mixon is a 61 y.o. male.  Patient returns with the MRI of his right knee.    Overall, patient's symptoms are worsening.    ROS:    Constiutional:Pt denies fever, chills, nausea, or vomiting.  MSK:as above        Objective      Past Medical History  Past Medical History:   Diagnosis Date   • Abdominal tenderness, epigastric    • Finger injury    • Hypothyroidism          Physical Exam  /75   Pulse 58   Ht 177.8 cm (70\")   Wt 81.6 kg (179 lb 14.3 oz)   BMI 25.81 kg/m²     Body mass index is 25.81 kg/m².    Patient is well nourished and well developed.        Ortho Exam      Musculoskeletal:  Global Assessment:  Overall assessment of Lower Extremity Muscle Strength and Tone:    Right quadriceps--5/5  Right hamstrings--5/5  Right tibialis anterior--5/5  Right gastroc soleus--5/5  Right EHL--5/5    Lower Extremity:    Right knee:  Tenderness:  Over medial joint line  Effusion:  1+  Crepitus:  none  Pulses:  2+  Ecchymosis:  None  Warmth:  None     ROM:  Right:  Extension:0    Flexion:130    Instability:      Right:  Lachman Test:  Negative  Varus stress test negative  Valgus stress test negative   Anterior Drawer Test:  Negative  Posterior Drawer Test:  Negative    Functional Testing:  Right:  Nancy's test:  Positive  Patella grind test:  Negative  Q-angle:  Normal  Apprehension Sign:  Negative      Imaging/Labs/EMG Reviewed:  Imaging Results (Last 24 Hours)     ** No results found for the last 24 hours. **        MRI Knee Right Without Contrast  Narrative: EXAMINATION: MRI KNEE RIGHT  WO CONTRAST-      INDICATION: Knee trauma, meniscal/ligament injury suspected, xray done  (Age >= 1y); M25.561-Pain in right knee; M17.11-Unilateral primary  osteoarthritis, right knee     TECHNIQUE: Axial gradient echo and STIR images, sagittal T1 and T2  fat-sat, coronal gradient " echo, STIR and T1-weighted images, oblique  sagittal T2-weighted images of the right knee     COMPARISON: Right knee plain films 11/11/2021     FINDINGS: Patient history indicates gradual onset right knee pain for  approximately eight months, with no specific associated trauma. Dull,  aching, burning, throbbing, stabbing and shooting pain. Medial joint  line pain. Clinical concern for medial meniscal tear.     Axial images show joint fluid only at upper limits of normal and no  significant effusion. Patellofemoral articular cartilage is  well-maintained. Medial and lateral patellofemoral ligaments appear  intact. No significant popliteal fossa cyst is seen.     Sagittal and coronal images show the extensor mechanism to appear  normal. The ACL and PCL appear normal. Collateral ligaments appear  intact and normal. There is some generalized thinning of the medial  compartment articular cartilage. Lateral compartment articular cartilage  appears lower normal, not unusual for age. No pathologic marrow signal  changes are seen.     Lateral meniscus appears intact and normal. Medial meniscus is torn with  a well-defined oblique tear extending from superior to inferior  articular surface on sagittal T1 image 18 series 5, actually better seen  on sagittal T2 image 18 series 4, and suggested on two adjacent images  as well. The meniscus posterior the level of the tear appears irregular,  suggesting some degree of meniscal degeneration, less likely fine,  branching tear. No displaced meniscal fragment is identified.     Impression: 1. Oblique tear of the posterior horn of the medial meniscus. No  evidence of displaced meniscal fragment.  2. Moderate generalized chondromalacia of the medial compartment.  3. No other evidence of internal derangement, acute or healing trauma  elsewhere.        This report was finalized on 11/18/2021 9:46 AM by Dr. Ehsan Reyes MD.     We reviewed images and report of the MRI above.  Patient does  have a torn medial meniscus that looks to be an early bucket-handle type tear has obliquely oriented and visible on both coronal and sagittal images.    Assessment    Assessment:  1. Right knee pain, unspecified chronicity    2. Primary osteoarthritis of right knee    3. Tear of medial meniscus of right knee, current, unspecified tear type, subsequent encounter        Plan:  1. Recommend over the counter anti-inflammatories for pain and/or swelling  2. Right knee medial meniscal tear--we discussed treatment options and alternatives with the patient.  At this point, he would like to pursue something definitive.  Treatment options were discussed with him including alternatives to surgery.  Specifically, the risk, benefits, potential hazards, typical recovery and rehab as well as reasonable alternatives to right knee arthroscopy and partial medial meniscectomy were discussed.  Patient had the option ask questions and wished to proceed with scheduling.  We will get this done as an outpatient in the near future.      Stevie Horan MD  11/18/21  13:30 EST      Dictated Utilizing Dragon Dictation.

## 2021-12-01 ENCOUNTER — APPOINTMENT (OUTPATIENT)
Dept: PREADMISSION TESTING | Facility: HOSPITAL | Age: 61
End: 2021-12-01

## 2021-12-01 DIAGNOSIS — M25.561 RIGHT KNEE PAIN, UNSPECIFIED CHRONICITY: ICD-10-CM

## 2021-12-01 DIAGNOSIS — M17.11 PRIMARY OSTEOARTHRITIS OF RIGHT KNEE: ICD-10-CM

## 2021-12-01 DIAGNOSIS — S83.241D TEAR OF MEDIAL MENISCUS OF RIGHT KNEE, CURRENT, UNSPECIFIED TEAR TYPE, SUBSEQUENT ENCOUNTER: ICD-10-CM

## 2021-12-01 LAB — SARS-COV-2 RNA PNL SPEC NAA+PROBE: NOT DETECTED

## 2021-12-01 PROCEDURE — U0004 COV-19 TEST NON-CDC HGH THRU: HCPCS

## 2021-12-01 PROCEDURE — C9803 HOPD COVID-19 SPEC COLLECT: HCPCS

## 2021-12-03 ENCOUNTER — OUTSIDE FACILITY SERVICE (OUTPATIENT)
Dept: ORTHOPEDIC SURGERY | Facility: CLINIC | Age: 61
End: 2021-12-03

## 2021-12-03 DIAGNOSIS — Z98.890 S/P ARTHROSCOPIC KNEE SURGERY: Primary | ICD-10-CM

## 2021-12-03 PROCEDURE — 29881 ARTHRS KNE SRG MNISECTMY M/L: CPT | Performed by: ORTHOPAEDIC SURGERY

## 2021-12-03 RX ORDER — HYDROCODONE BITARTRATE AND ACETAMINOPHEN 5; 325 MG/1; MG/1
1 TABLET ORAL EVERY 6 HOURS PRN
Qty: 15 TABLET | Refills: 0 | Status: SHIPPED | OUTPATIENT
Start: 2021-12-03 | End: 2021-12-21

## 2021-12-21 ENCOUNTER — OFFICE VISIT (OUTPATIENT)
Dept: ORTHOPEDIC SURGERY | Facility: CLINIC | Age: 61
End: 2021-12-21

## 2021-12-21 VITALS — TEMPERATURE: 97.3 F

## 2021-12-21 DIAGNOSIS — M17.11 PRIMARY OSTEOARTHRITIS OF RIGHT KNEE: ICD-10-CM

## 2021-12-21 DIAGNOSIS — Z98.890 S/P ARTHROSCOPIC KNEE SURGERY: Primary | ICD-10-CM

## 2021-12-21 DIAGNOSIS — S83.241D TEAR OF MEDIAL MENISCUS OF RIGHT KNEE, CURRENT, UNSPECIFIED TEAR TYPE, SUBSEQUENT ENCOUNTER: ICD-10-CM

## 2021-12-21 PROCEDURE — 99024 POSTOP FOLLOW-UP VISIT: CPT | Performed by: PHYSICIAN ASSISTANT

## 2021-12-21 NOTE — PROGRESS NOTES
[facility] – [place] [facility] [place]        Subjective     Post-op (2.5 weeks s/p right knee arthroscopy with partial medial meniscectomy 12/3/21)       ADALBERTO Mixon is a 61 y.o. male. Patient presents for their initial postop visit following right knee arthroscopy with partial medial meniscectomy performed on the above date by Dr. Horan.    Pain scale: 3/10. Pain and swelling improving.  Still with pain walking, standing, climbing stairs, working but better since surgery.  No mechanical symptoms.  No numbness or tingling into extremity.    Patient denies any fever, chills, night sweats or other constitutional symptoms.        Objective      Physical Exam  Temp 97.3 °F (36.3 °C)     There is no height or weight on file to calculate BMI.        Ortho Exam  Peripheral Vascular:    Upper Extremity:   Inspection:  Left--no cyanotic nail beds Right--no cyanotic nail beds   Bilateral:  Pink nail beds with brisk capillary refill   Palpation:  Bilateral radial pulse normal    Musculoskeletal:  Global Assessment:  Overall assessment of Lower Extremity Muscle Strength and Tone:    Right quadriceps--4+/5    Lower Extremity:  Knee:      Inspection and Palpation:      Right knee:    Effusion:  Trace  Crepitus:  Mild  Pulses:  2+  Ecchymosis:  None  Warmth:  None  Incision:  No erythema, healing appropriately.  Sutures have already fallen out.   Calf:  Non tender    ROM:  Right:  Extension:0    Flexion:120      Imaging Reviewed:  No new imaging.      Assessment:  1. S/P arthroscopic knee surgery    2. Tear of medial meniscus of right knee, current, unspecified tear type, subsequent encounter    3. Primary osteoarthritis of right knee        Plan:  1. Status post right knee arthroscopy with partial medial meniscectomy along with noted arthritic changes, stable.  2. Reviewed arthroscopic images with patient.  3. Offered to referred to formal PT but patient declines.  He'll work on home exercises.  4. Recommend  OTC NSAIDS/pain medication.  5. Follow up in 4 weeks for repeat evaluation.  May cancel if not having any issues.  6. Questions and concerns answered.      Christina Lechuga PA-C  12/23/21  21:39 EST      Dictated Utilizing Dragon Dictation.

## 2021-12-27 DIAGNOSIS — N52.9 ERECTILE DYSFUNCTION, UNSPECIFIED ERECTILE DYSFUNCTION TYPE: ICD-10-CM

## 2021-12-27 RX ORDER — SILDENAFIL 100 MG/1
TABLET, FILM COATED ORAL
Qty: 30 TABLET | Refills: 3 | Status: SHIPPED | OUTPATIENT
Start: 2021-12-27 | End: 2022-03-16 | Stop reason: SDUPTHER

## 2022-03-16 ENCOUNTER — OFFICE VISIT (OUTPATIENT)
Dept: INTERNAL MEDICINE | Facility: CLINIC | Age: 62
End: 2022-03-16

## 2022-03-16 ENCOUNTER — LAB (OUTPATIENT)
Dept: LAB | Facility: HOSPITAL | Age: 62
End: 2022-03-16

## 2022-03-16 VITALS
BODY MASS INDEX: 26.37 KG/M2 | WEIGHT: 184.2 LBS | HEIGHT: 70 IN | DIASTOLIC BLOOD PRESSURE: 60 MMHG | HEART RATE: 60 BPM | OXYGEN SATURATION: 95 % | SYSTOLIC BLOOD PRESSURE: 132 MMHG

## 2022-03-16 DIAGNOSIS — E55.9 VITAMIN D DEFICIENCY: ICD-10-CM

## 2022-03-16 DIAGNOSIS — Z12.5 PROSTATE CANCER SCREENING: ICD-10-CM

## 2022-03-16 DIAGNOSIS — G89.29 CHRONIC PAIN OF MULTIPLE JOINTS: ICD-10-CM

## 2022-03-16 DIAGNOSIS — K21.00 GASTROESOPHAGEAL REFLUX DISEASE WITH ESOPHAGITIS WITHOUT HEMORRHAGE: ICD-10-CM

## 2022-03-16 DIAGNOSIS — M25.50 CHRONIC PAIN OF MULTIPLE JOINTS: ICD-10-CM

## 2022-03-16 DIAGNOSIS — Z00.00 ROUTINE GENERAL MEDICAL EXAMINATION AT A HEALTH CARE FACILITY: ICD-10-CM

## 2022-03-16 DIAGNOSIS — R73.01 IFG (IMPAIRED FASTING GLUCOSE): ICD-10-CM

## 2022-03-16 DIAGNOSIS — M47.812 CERVICAL SPONDYLOSIS WITHOUT MYELOPATHY: ICD-10-CM

## 2022-03-16 DIAGNOSIS — M77.12 LATERAL EPICONDYLITIS OF LEFT ELBOW: ICD-10-CM

## 2022-03-16 DIAGNOSIS — N52.9 ERECTILE DYSFUNCTION, UNSPECIFIED ERECTILE DYSFUNCTION TYPE: ICD-10-CM

## 2022-03-16 DIAGNOSIS — Z00.00 ROUTINE GENERAL MEDICAL EXAMINATION AT A HEALTH CARE FACILITY: Primary | ICD-10-CM

## 2022-03-16 DIAGNOSIS — E03.8 OTHER SPECIFIED HYPOTHYROIDISM: ICD-10-CM

## 2022-03-16 LAB
25(OH)D3 SERPL-MCNC: 46.6 NG/ML (ref 30–100)
ALBUMIN SERPL-MCNC: 4.9 G/DL (ref 3.5–5.2)
ALBUMIN/GLOB SERPL: 2 G/DL
ALP SERPL-CCNC: 86 U/L (ref 39–117)
ALT SERPL W P-5'-P-CCNC: 31 U/L (ref 1–41)
ANION GAP SERPL CALCULATED.3IONS-SCNC: 9.2 MMOL/L (ref 5–15)
AST SERPL-CCNC: 29 U/L (ref 1–40)
BILIRUB BLD-MCNC: NEGATIVE MG/DL
BILIRUB SERPL-MCNC: 0.3 MG/DL (ref 0–1.2)
BUN SERPL-MCNC: 21 MG/DL (ref 8–23)
BUN/CREAT SERPL: 21.2 (ref 7–25)
CALCIUM SPEC-SCNC: 9.9 MG/DL (ref 8.6–10.5)
CHLORIDE SERPL-SCNC: 104 MMOL/L (ref 98–107)
CHOLEST SERPL-MCNC: 139 MG/DL (ref 0–200)
CLARITY, POC: CLEAR
CO2 SERPL-SCNC: 27.8 MMOL/L (ref 22–29)
COLOR UR: YELLOW
CREAT SERPL-MCNC: 0.99 MG/DL (ref 0.76–1.27)
DEPRECATED RDW RBC AUTO: 43.5 FL (ref 37–54)
EGFRCR SERPLBLD CKD-EPI 2021: 87.2 ML/MIN/1.73
ERYTHROCYTE [DISTWIDTH] IN BLOOD BY AUTOMATED COUNT: 13.2 % (ref 12.3–15.4)
EXPIRATION DATE: NORMAL
GLOBULIN UR ELPH-MCNC: 2.5 GM/DL
GLUCOSE SERPL-MCNC: 102 MG/DL (ref 65–99)
GLUCOSE UR STRIP-MCNC: NEGATIVE MG/DL
HBA1C MFR BLD: 5.4 % (ref 4.8–5.6)
HCT VFR BLD AUTO: 47.3 % (ref 37.5–51)
HDLC SERPL-MCNC: 65 MG/DL (ref 40–60)
HGB BLD-MCNC: 15.9 G/DL (ref 13–17.7)
KETONES UR QL: NEGATIVE
LDLC SERPL CALC-MCNC: 61 MG/DL (ref 0–100)
LDLC/HDLC SERPL: 0.94 {RATIO}
LEUKOCYTE EST, POC: NEGATIVE
Lab: NORMAL
MCH RBC QN AUTO: 31.1 PG (ref 26.6–33)
MCHC RBC AUTO-ENTMCNC: 33.6 G/DL (ref 31.5–35.7)
MCV RBC AUTO: 92.4 FL (ref 79–97)
NITRITE UR-MCNC: NEGATIVE MG/ML
PH UR: 6 [PH] (ref 5–8)
PLATELET # BLD AUTO: 303 10*3/MM3 (ref 140–450)
PMV BLD AUTO: 9.6 FL (ref 6–12)
POTASSIUM SERPL-SCNC: 5.1 MMOL/L (ref 3.5–5.2)
PROT SERPL-MCNC: 7.4 G/DL (ref 6–8.5)
PROT UR STRIP-MCNC: NEGATIVE MG/DL
PSA SERPL-MCNC: 3.2 NG/ML (ref 0–4)
RBC # BLD AUTO: 5.12 10*6/MM3 (ref 4.14–5.8)
RBC # UR STRIP: NEGATIVE /UL
SODIUM SERPL-SCNC: 141 MMOL/L (ref 136–145)
SP GR UR: 1.02 (ref 1–1.03)
TRIGL SERPL-MCNC: 66 MG/DL (ref 0–150)
TSH SERPL DL<=0.05 MIU/L-ACNC: 2.44 UIU/ML (ref 0.27–4.2)
UROBILINOGEN UR QL: NORMAL
VIT B12 BLD-MCNC: 824 PG/ML (ref 211–946)
VLDLC SERPL-MCNC: 13 MG/DL (ref 5–40)
WBC NRBC COR # BLD: 6.52 10*3/MM3 (ref 3.4–10.8)

## 2022-03-16 PROCEDURE — 36415 COLL VENOUS BLD VENIPUNCTURE: CPT

## 2022-03-16 PROCEDURE — 82607 VITAMIN B-12: CPT

## 2022-03-16 PROCEDURE — 99396 PREV VISIT EST AGE 40-64: CPT | Performed by: INTERNAL MEDICINE

## 2022-03-16 PROCEDURE — G0103 PSA SCREENING: HCPCS

## 2022-03-16 PROCEDURE — 82306 VITAMIN D 25 HYDROXY: CPT

## 2022-03-16 PROCEDURE — 80061 LIPID PANEL: CPT

## 2022-03-16 PROCEDURE — 80050 GENERAL HEALTH PANEL: CPT

## 2022-03-16 PROCEDURE — 84550 ASSAY OF BLOOD/URIC ACID: CPT

## 2022-03-16 PROCEDURE — 81003 URINALYSIS AUTO W/O SCOPE: CPT | Performed by: INTERNAL MEDICINE

## 2022-03-16 PROCEDURE — 83036 HEMOGLOBIN GLYCOSYLATED A1C: CPT

## 2022-03-16 RX ORDER — SILDENAFIL 100 MG/1
100 TABLET, FILM COATED ORAL DAILY PRN
Qty: 30 TABLET | Refills: 3 | Status: SHIPPED | OUTPATIENT
Start: 2022-03-16 | End: 2022-09-09 | Stop reason: SDUPTHER

## 2022-03-16 RX ORDER — LEVOTHYROXINE SODIUM 0.07 MG/1
75 TABLET ORAL DAILY
Qty: 90 TABLET | Refills: 3 | Status: SHIPPED | OUTPATIENT
Start: 2022-03-16

## 2022-03-16 RX ORDER — PREDNISONE 10 MG/1
TABLET ORAL
Qty: 20 TABLET | Refills: 0 | Status: SHIPPED | OUTPATIENT
Start: 2022-03-16 | End: 2022-09-09

## 2022-03-16 RX ORDER — OMEPRAZOLE 20 MG/1
20 CAPSULE, DELAYED RELEASE ORAL DAILY
Qty: 90 CAPSULE | Refills: 3 | Status: SHIPPED | OUTPATIENT
Start: 2022-03-16

## 2022-03-16 RX ORDER — CELECOXIB 200 MG/1
200 CAPSULE ORAL DAILY
Qty: 90 CAPSULE | Refills: 3 | Status: SHIPPED | OUTPATIENT
Start: 2022-03-16

## 2022-03-16 NOTE — PROGRESS NOTES
Chief Complaint   Patient presents with   • Annual Exam       History of Present Illness  HM, Adult Male: The patient is being seen for a health maintenance evaluation. The last health maintenance visit was 1 year(s) ago.   Social History: Household members include spouse. He is  with _ children. Work status: working full time. The patient has never smoked cigarettes. He reports never drinking alcohol. He has never used illicit drugs.   General Health: The patient's health is described as good. He has regular dental visits. He denies vision problems. He denies hearing loss. Immunizations status: up to date.   Lifestyle:. He consumes a diverse and healthy diet. He does not have any weight concerns. He exercises regularly. He does not use tobacco. He denies alcohol use. He denies drug use.   Screening: Cancer screening reviewed and current.   Metabolic screening reviewed and current.   Risk screening reviewed and current.     Knee is better after the scope. Back and neck are painful , and hands are swollen and painful. His rheumatologist was talking about changing his meds. Now has some nausea with MTX.  C/o left elbow pain, and has been resting it.    Has had ablation of his nerve -cervical spine. Pain came back in 3 months and is having a hard time moving his neck and headaches( frontal- 6/10) Also had injections to his hip.    Review of Systems   Constitutional: Negative for chills and fatigue.   HENT: Negative for congestion, ear pain and sore throat.    Eyes: Negative for pain, redness and visual disturbance.   Respiratory: Negative for cough and shortness of breath.    Cardiovascular: Negative for chest pain, palpitations and leg swelling.   Gastrointestinal: Negative for abdominal pain, diarrhea and nausea.   Endocrine: Negative for cold intolerance and heat intolerance.   Genitourinary: Negative for flank pain and urgency.   Musculoskeletal: Positive for arthralgias, myalgias, neck pain and neck  stiffness. Negative for gait problem.   Skin: Negative for pallor and rash.   Neurological: Positive for numbness and headaches. Negative for dizziness and weakness.   Psychiatric/Behavioral: Negative for dysphoric mood and sleep disturbance. The patient is not nervous/anxious.        Patient Active Problem List   Diagnosis   • Esophageal reflux   • Hyperglycemia   • Hypothyroidism   • Vitamin D deficiency   • Duodenitis   • Dyslipidemia   • Family history of prostate cancer in father   • Cervical spondylosis without myelopathy   • DDD (degenerative disc disease), cervical   • Rheumatoid arthritis with positive rheumatoid factor (HCC)   • Myofascial pain   • Occipital headache   • Insomnia due to medical condition   • Cervicothoracic interspinous bursitis       Social History     Socioeconomic History   • Marital status:    Tobacco Use   • Smoking status: Former Smoker   • Smokeless tobacco: Never Used   Vaping Use   • Vaping Use: Never used   Substance and Sexual Activity   • Alcohol use: No   • Sexual activity: Defer       Current Outpatient Medications on File Prior to Visit   Medication Sig Dispense Refill   • Cholecalciferol 25 MCG (1000 UT) capsule Take 1 capsule by mouth Daily.     • folic acid (FOLVITE) 1 MG tablet Take 1 tablet by mouth Daily.     • methotrexate 2.5 MG tablet Take 6 tablets by mouth 1 (One) Time Per Week.     • [DISCONTINUED] celecoxib (CeleBREX) 200 MG capsule Take 1 capsule by mouth Daily. Replaces ibuprofen 30 capsule 5   • [DISCONTINUED] levothyroxine (SYNTHROID, LEVOTHROID) 75 MCG tablet Take 1 tablet by mouth Daily. 90 tablet 1   • [DISCONTINUED] omeprazole (priLOSEC) 20 MG capsule Take 1 capsule by mouth Daily. 30 capsule 5   • [DISCONTINUED] sildenafil (VIAGRA) 100 MG tablet TAKE 1/2 TO 1 TABLET BY MOUTH DAILY AS NEEDED FOR ERECTILE DYSFUNCTION 30 tablet 3   • [DISCONTINUED] cyclobenzaprine (FLEXERIL) 10 MG tablet Take 1 tablet by mouth Every Night. 30 tablet 3   •  "[DISCONTINUED] nortriptyline (PAMELOR) 10 MG capsule 1-2 tablets at bedtime 60 capsule 5     No current facility-administered medications on file prior to visit.       No Known Allergies    /60   Pulse 60   Ht 177.8 cm (70\")   Wt 83.6 kg (184 lb 3.2 oz)   SpO2 95% Comment: cristal  BMI 26.43 kg/m²          The following portions of the patient's history were reviewed and updated as appropriate: allergies, current medications, past family history, past medical history, past social history, past surgical history and problem list.    Physical Exam  Constitutional:       General: He is not in acute distress.     Appearance: Normal appearance.   HENT:      Head: Normocephalic and atraumatic.      Right Ear: Tympanic membrane and external ear normal.      Left Ear: Tympanic membrane and external ear normal.      Nose: Nose normal.      Mouth/Throat:      Mouth: Mucous membranes are moist.   Eyes:      General: No scleral icterus.  Neck:      Vascular: No carotid bruit.   Cardiovascular:      Rate and Rhythm: Normal rate and regular rhythm.      Pulses: Normal pulses.      Heart sounds: Normal heart sounds. No murmur heard.    No friction rub. No gallop.   Pulmonary:      Effort: Pulmonary effort is normal.      Breath sounds: Normal breath sounds. No rhonchi or rales.   Abdominal:      General: Bowel sounds are normal. There is no distension.      Palpations: Abdomen is soft.      Tenderness: There is no right CVA tenderness, left CVA tenderness, guarding or rebound.      Hernia: No hernia is present.   Musculoskeletal:         General: Swelling (left elbow) and tenderness (left elbow, and b/l neck) present. Normal range of motion.      Cervical back: Normal range of motion.      Right lower leg: No edema.      Left lower leg: No edema.   Lymphadenopathy:      Cervical: No cervical adenopathy.   Skin:     General: Skin is warm.      Findings: No rash.   Neurological:      General: No focal deficit present.      " Mental Status: He is alert and oriented to person, place, and time. Mental status is at baseline.      Cranial Nerves: No cranial nerve deficit.      Sensory: No sensory deficit.      Motor: Weakness present.      Coordination: Coordination normal.      Gait: Gait normal.      Deep Tendon Reflexes: Reflexes normal.   Psychiatric:         Mood and Affect: Mood normal.         Behavior: Behavior normal.         Results for orders placed or performed in visit on 12/01/21   COVID-19, APTIMA PANTHER [facility] IN-HOUSE NP/OP SWAB IN UTM/VTM/[facility] [employer]SPORT MEDIA 24HR TAT - Swab, Nasopharynx    Specimen: Nasopharynx; Swab   Result Value Ref Range    COVID19 Not Detected Not Detected - Ref. Range       Diagnoses and all orders for this visit:    1. Routine general medical examination at a health care facility (Primary)  -     POCT urinalysis dipstick, automated  -     Hemoglobin A1c; Future  -     CBC (No Diff); Future  -     Comprehensive Metabolic Panel; Future  -     Lipid Panel; Future  -     TSH; Future  -     Vitamin B12; Future  -     Uric Acid; Future    2. Chronic pain of multiple joints  -     celecoxib (CeleBREX) 200 MG capsule; Take 1 capsule by mouth Daily. Replaces ibuprofen  Dispense: 90 capsule; Refill: 3  -     Uric Acid; Future    3. Other specified hypothyroidism  Comments:  stable, continue current regimen.  Orders:  -     levothyroxine (SYNTHROID, LEVOTHROID) 75 MCG tablet; Take 1 tablet by mouth Daily.  Dispense: 90 tablet; Refill: 3    4. Gastroesophageal reflux disease with esophagitis without hemorrhage  -     omeprazole (priLOSEC) 20 MG capsule; Take 1 capsule by mouth Daily.  Dispense: 90 capsule; Refill: 3    5. Erectile dysfunction, unspecified erectile dysfunction type  -     sildenafil (VIAGRA) 100 MG tablet; Take 1 tablet by mouth Daily As Needed for Erectile Dysfunction.  Dispense: 30 tablet; Refill: 3    6. Lateral epicondylitis of left elbow  -     predniSONE (DELTASONE) 10 MG tablet; Take 3  tabs daily x 3 days then 2 tabs daily x 3 days then 1 tab daily x 3 days, 1/2 tab daily x 3 days then stop.  Dispense: 20 tablet; Refill: 0    7. IFG (impaired fasting glucose)  -     Hemoglobin A1c; Future    8. Cervical spondylosis without myelopathy  -     MRI Cervical Spine Without Contrast; Future    9. Prostate cancer screening  -     PSA Screen; Future    10. Vitamin D deficiency  -     Vitamin D 25 Hydroxy; Future        Health Maintenance   Topic Date Due   • ZOSTER VACCINE (1 of 2) Never done   • INFLUENZA VACCINE  03/16/2023 (Originally 8/1/2021)   • COLORECTAL CANCER SCREENING  09/15/2022   • ANNUAL PHYSICAL  03/17/2023   • TDAP/TD VACCINES (3 - Td or Tdap) 09/14/2025   • HEPATITIS C SCREENING  Completed   • COVID-19 Vaccine  Completed   • Pneumococcal Vaccine 0-64  Aged Out       Discussion/Summary  Impression: health maintenance visit, healthy adult male.   Currently, he eats a healthy diet and has an adequate exercise regimen.   Prostate cancer screening: PSA was ordered.   Testicular cancer screening: monthly self testicular exam was advised.   Colorectal cancer screening: fecal occult blood testing is needed every year and colonoscopy is up-to-date.   CT low dose screen-not indicated  Screening lab work includes glucose, lipid profile and 25-hydroxyvitamin D.   The immunizations are up to date.   Advice and education were given regarding cardiovascular risk reduction, healthy dietary habits, Seatbelt and helmet use and self skin examination.        Return in about 6 months (around 9/16/2022) for Next scheduled follow up.    Electronically signed by:    Stephanie Livingston MD

## 2022-03-17 LAB — URATE SERPL-MCNC: 5.1 MG/DL (ref 3.4–7)

## 2022-04-09 ENCOUNTER — HOSPITAL ENCOUNTER (OUTPATIENT)
Dept: MRI IMAGING | Facility: HOSPITAL | Age: 62
Discharge: HOME | End: 2022-04-09
Admitting: INTERNAL MEDICINE

## 2022-04-09 DIAGNOSIS — M47.812 CERVICAL SPONDYLOSIS WITHOUT MYELOPATHY: ICD-10-CM

## 2022-04-09 PROCEDURE — 72141 MRI NECK SPINE W/O DYE: CPT

## 2022-04-12 ENCOUNTER — OFFICE VISIT (OUTPATIENT)
Dept: ORTHOPEDIC SURGERY | Facility: CLINIC | Age: 62
End: 2022-04-12

## 2022-04-12 VITALS
DIASTOLIC BLOOD PRESSURE: 76 MMHG | SYSTOLIC BLOOD PRESSURE: 132 MMHG | HEIGHT: 70 IN | BODY MASS INDEX: 26.34 KG/M2 | WEIGHT: 184 LBS

## 2022-04-12 DIAGNOSIS — M17.11 PRIMARY OSTEOARTHRITIS OF RIGHT KNEE: Primary | ICD-10-CM

## 2022-04-12 DIAGNOSIS — Z98.890 S/P ARTHROSCOPIC KNEE SURGERY: ICD-10-CM

## 2022-04-12 PROCEDURE — 99212 OFFICE O/P EST SF 10 MIN: CPT | Performed by: ORTHOPAEDIC SURGERY

## 2022-04-12 NOTE — PROGRESS NOTES
"    [facility]        Subjective     CC: Follow-up of the Right Knee (3mo f/u Tear of medial meniscus of right knee, current, unspecified tear type)      HPI    Philippe Mixon is a 61 y.o. male.  Patient is here today for follow-up of his right knee injury.  He had surgery in December 2021 and did well after that procedure.  He tells me that he had an episode a few weeks ago where he squatted down and felt sharp pain in the medial side of the right knee.  Has not happened since.  He want to get it checked out.    Overall, patient's symptoms are as above.    ROS:    Constiutional:Pt denies fever, chills, nausea, or vomiting.  MSK:as above        Objective      Past Medical History  Past Medical History:   Diagnosis Date   • Abdominal tenderness, epigastric    • Finger injury    • Hypothyroidism          Physical Exam  /76   Ht 177.8 cm (70\")   Wt 83.5 kg (184 lb)   BMI 26.40 kg/m²     Body mass index is 26.4 kg/m².    Patient is well nourished and well developed.        Ortho Exam      Musculoskeletal:  Global Assessment:  Overall assessment of Lower Extremity Muscle Strength and Tone:  Right quadriceps--5/5   Right hamstrings--5/5       Right tibialis anterior--5/5  Right gastroc-soleus--5/5  Right EHL --5/5    Lower Extremity:    Inspection and Palpation:  Right knee:  Tenderness:  Over the medial joint line and moderate severity  Effusion:  1+  Crepitus:  Positive  Pulses:  2+  Ecchymosis:  None  Warmth:  None     ROM:  Right:  Extension: 5    Flexion:120    Instability:    Right:  Lachman Test:  Negative   Varus stress test negative   Valgus stress test negative    Deformities/Malalignments/Discrepancies:    Left:  No deformities   Right:  Genu Varum    Functional Testing:  Nancy's test:  Negative  Patella grind test:  Positive  Q-angle:  normal          Imaging/Labs/EMG Reviewed:  Imaging Results (Last 24 Hours)     ** No results found for the last 24 hours. **    "         Assessment    Assessment:  1. Primary osteoarthritis of right knee    2. S/P arthroscopic knee surgery        Plan:  1. Recommend over the counter anti-inflammatories for pain and/or swelling  2. Medial sided right knee pain after partial medial meniscectomy--patient had some grade 3 changes on medial femoral condyle the time of surgery.  The plan will be for observation for now.  If this returns or becomes a recurrent issue, I have asked him to come back otherwise I think we can leave follow-up open-ended for now.      Stevie Horan MD  04/12/22  10:44 EDT      Dictated Utilizing Dragon Dictation.

## 2022-04-26 NOTE — PROGRESS NOTES
"Chief Complaint: \"My neck still bothers me, I do not feel like the procedure worked.\"       History of Present Illness:  Mr. Philippe Mixon, 62 y.o. male originally referred by Dr. Stephanie Livingston in consultation for chronic intractable neck pain and headaches. Patient reports a 1-2 year history of neck pain, which began without incident.  We last saw him on October 27, 2021, when he underwent bilateral cervical medial branch rhizotomies, from which he reports experiencing 60% pain relief lasting 3-4 weeks, with recurrence of his symptoms to baseline.  He was recently evaluated by his PCP and ordered a new MRI of the cervical spine due to an increase in pain.  He has a neurosurgical consultation with Dr. Steven Merida in May. He did participate in physical therapy. He returns today for postprocedure follow up and evaluation.  Pain Description: constant pain with intermittent exacerbation, described as aching, stabbing, throbbing, and sharp sensation.   Radiation of Pain: The pain radiates into the occipital region causing headaches and into the bilateral shoulders.   Pain intensity today: 6/10  Average pain intensity last week: 5/10  Pain intensity ranges from: 4/10 to 7/10  Aggravating factors: Pain increases with extension, rotation of the cervical spine   Alleviating factors: Pain decreases with physical therapy   Associated Symptoms:   Patient denies pain, numbness, or weakness in the upper extremities.  He does report some pain extending into his arms stopping above at the elbows, from which he relates to prior rotator cuff surgeries.  Patient denies any new bladder or bowel problems.   Patient reports difficulties with his balance but denies recent falls (related to his chronic back pain).   Patient reports bilateral cervicogenic and occipital headaches 3-4x a month lasting several hours.    Review of previous therapies and additional medical records:  Philippe Mixon has already failed the following " measures, including:   Conservative Measures: Oral analgesics, topical analgesics, ice, heat, chiropractic therapy, physical therapy   Interventional Measures: Some injections with Dr. Batres more than 8 years ago  04/28/2021: DxTx cervical facet joint injections: bilateral C4-C5, bilateral C5-C6 combined with cervicothoracic interspinous bursa injection  4/26/2022: Bilateral cervical RFA at C4-C5 and C5-C6  Surgical Measures: No history of cervical spine. No history of lumbar or hip surgery. 2011 Left Rotator cuff repair. 2010 Right Rotator cuff repair.   Philippe Mixon has not undergone orthopedic spine or neurosurgical consultation for chronic pain condition   Philippe Mixon presents with significant comorbidities including RA, hypothyroidism  In terms of current analgesics, Philippe Mixon takes: Celebrex  I have reviewed Tyrone Report is consistent with medication reconciliation.  SOAPP: Low Risk    Global Pain Scale 04-01 2021 08-09 2021         Pain 14 12         Feelings 0 0         Clinical outcomes 10 9         Activities 14 11         GPS Total: 38 32           Review of New Diagnostic Studies:  MRI of the cervical spine without contrast 4/9/2022: I have reviewed the imaging and the radiology report.  Vertebral body heights are well-maintained without evidence of malalignment.  Multilevel spondylosis.  C2-C3: Disc osteophyte complex and facet arthritis.  Mild right neuroforaminal stenosis.  C3-C4: Disc osteophyte complex with bilateral facet arthritis.  Mild central spinal canal stenosis and moderate to severe bilateral neuroforaminal stenosis, greater on the right.  C4-C5: Disc osteophyte complex and facet arthritis.  Moderate central spinal canal stenosis and bilateral neuroforaminal stenosis.  C5-C6: Disc osteophyte complex with facet arthritis.  Moderate central spinal canal stenosis and moderate right and severe left neuroforaminal stenosis.  C6-C7: Disc osteophyte complex with mild to  moderate narrowing of the left spinal canal.  With mild bilateral neuroforaminal stenosis.    Review of Diagnostic Studies:   MRI of the brain without contrast September 26, 2020.  Images reveal extensive high T2/FLAIR signal foci throughout the supratentorial white matter.  Findings are nonspecific and could reflect chronic small vessel disease, demyelinating disease, vasculitis, or chronic migraines.  Trace mastoid effusions.  Mild paranasal sinus mucosal thickening  MRI of the cervical spine without contrast September 26, 2020.  Images reveal the posterior fossa image unremarkable.  The cord signal and caliber appear normal.  Diffuse cervical spondylosis.  Axial imaging:  C2-C3: Disc osteophyte complex, central disc protrusion.  Mild bilateral foraminal stenosis  C3-C4: Disc osteophyte complex.  Moderate bilateral foraminal stenosis  C4-C5: Disc osteophyte complex.  Mild bilateral foraminal stenosis  C5-C6: Mild left paracentral disc osteophyte effacing the ventral thecal sac.  Central disc protrusion.  Mild right and moderate to severe left neuroforaminal stenosis  C6-C7: Left paracentral disc protrusion causing mild deformation of the ventral cord contour.  Mild right and moderate left foraminal stenosis  C7-T1: Mild disc osteophyte complex.  No significant canal stenosis.  Mild bilateral foraminal stenosis    Review of Systems   Musculoskeletal: Positive for neck pain and neck stiffness.   Neurological: Positive for headaches.   All other systems reviewed and are negative.        Patient Active Problem List   Diagnosis   • Esophageal reflux   • Hyperglycemia   • Hypothyroidism   • Vitamin D deficiency   • Duodenitis   • Dyslipidemia   • Family history of prostate cancer in father   • Cervical spondylosis without myelopathy   • DDD (degenerative disc disease), cervical   • Rheumatoid arthritis with positive rheumatoid factor (HCC)   • Myofascial pain   • Occipital headache   • Insomnia due to medical condition    • Cervicothoracic interspinous bursitis       Past Medical History:   Diagnosis Date   • Abdominal tenderness, epigastric    • Finger injury    • Hypothyroidism          Past Surgical History:   Procedure Laterality Date   • EYE SURGERY      Repair   • EYE SURGERY Left 2018    Cataract Removal   • KNEE SURGERY Right 12/03/2021    right knee arthroscopy with partial medial meniscectomy Dr. Horan [facility]   • RETINAL DETACHMENT REPAIR Left 2016   • ROTATOR CUFF REPAIR Left 2010    yomaira ruizbler   • ROTATOR CUFF REPAIR Right 2011    Yomaira Ruizbler   • SHOULDER ARTHROSCOPY           Family History   Problem Relation Age of Onset   • Arthritis Other    • Cancer Other    • Diabetes Other    • Hypertension Other    • Thyroid disease Other          Social History     Socioeconomic History   • Marital status:    Tobacco Use   • Smoking status: Former Smoker   • Smokeless tobacco: Never Used   Vaping Use   • Vaping Use: Never used   Substance and Sexual Activity   • Alcohol use: No   • Sexual activity: Defer           Current Outpatient Medications:   •  celecoxib (CeleBREX) 200 MG capsule, Take 1 capsule by mouth Daily. Replaces ibuprofen, Disp: 90 capsule, Rfl: 3  •  Cholecalciferol 25 MCG (1000 UT) capsule, Take 1 capsule by mouth Daily., Disp: , Rfl:   •  cyanocobalamin (VITAMIN B-12) 1000 MCG tablet, , Disp: , Rfl:   •  folic acid (FOLVITE) 1 MG tablet, Take 1 tablet by mouth Daily., Disp: , Rfl:   •  levothyroxine (SYNTHROID, LEVOTHROID) 75 MCG tablet, Take 1 tablet by mouth Daily., Disp: 90 tablet, Rfl: 3  •  methotrexate 2.5 MG tablet, Take 6 tablets by mouth 1 (One) Time Per Week., Disp: , Rfl:   •  omeprazole (priLOSEC) 20 MG capsule, Take 1 capsule by mouth Daily., Disp: 90 capsule, Rfl: 3  •  predniSONE (DELTASONE) 10 MG tablet, Take 3 tabs daily x 3 days then 2 tabs daily x 3 days then 1 tab daily x 3 days, 1/2 tab daily x 3 days then stop., Disp: 20 tablet, Rfl: 0  •  sildenafil (VIAGRA) 100 MG tablet, Take 1  "tablet by mouth Daily As Needed for Erectile Dysfunction., Disp: 30 tablet, Rfl: 3      No Known Allergies      /81   Pulse 56   Temp 97.1 °F (36.2 °C)   Ht 177.8 cm (70\")   Wt 85.5 kg (188 lb 9.6 oz)   SpO2 98%   BMI 27.06 kg/m²       Physical Exam:  Constitutional: Patient appears well-developed, well-nourished, well-hydrated  HEENT: Head: Normocephalic and atraumatic  Eyes: Conjunctivae and lids are normal  Pupils: Equal, round, reactive to light  Neck: Trachea normal. Neck supple. No JVD present.   Lymphatic: No cervical adenopathy  Pulmonary: No increased work of breathing or signs of respiratory distress.   Musculoskeletal   Gait and station: Gait evaluation demonstrated a normal gait.    Cervical spine: Passive and active range of motion are somewhat limited with pain. Extension, lateral flexion, rotation of the cervical spine increased and reproduced pain. Cervical facet joint loading maneuvers are positive.  There is no pain upon palpation of the cervicothoracic interspinous bursa today.  Muscles: Presence of active trigger points at the levator scapulae   Shoulders: The range of motion of the glenohumeral joints is slightly limited bilaterally. Rotator cuff strength is 5/5.   Neurological:   Patient is alert and oriented to person, place, and time.   Speech: Normal.   Cortical function: Normal mental status.   Cranial nerves 2-12: intact.   Reflex Scores:  Right brachioradialis: 2+  Left brachioradialis: 2+  Right biceps: 2+  Left biceps: 2+  Right triceps: 2+  Left triceps: 2+  Right patellar: 2+  Left patellar: 2+  Right Achilles: 2+  Left Achilles: 2+  Motor strength: 5/5  Motor Tone: Normal .   Involuntary movements: None.   Superficial/Primitive Reflexes: Primitive reflexes were absent.   Right Bai: Absent  Left Bai: Absent  Right ankle clonus: Absent  Left ankle clonus: Absent   Babinsky: Absent  Spurling sign: Mildly positive on the left. Neck tornado test: Negative. Lhermitte " sign: Negative. Long tract signs: Negative.   Sensory exam: Intact to light touch, intact pain and temperature sensation, intact vibration sensation and normal proprioception.   Coordination: Normal finger to nose and heel to shin. Normal balance and negative Romberg's sign   Skin and subcutaneous tissue: Skin is warm and intact. No rash noted. No cyanosis.   Psychiatric: Judgment and insight: Normal. Orientation to person, place and time: Normal. Recent and remote memory: Intact. Mood and affect: Normal.              ASSESSMENT:   1. Cervical spondylosis without myelopathy    2. Cervicothoracic interspinous bursitis    3. DDD (degenerative disc disease), cervical    4. Cervical spinal stenosis    5. Occipital headache    6. Rheumatoid arthritis with positive rheumatoid factor, involving unspecified site (HCC)    7. Myofascial pain        PLAN/MEDICAL DECISION MAKING:  Patient reports a 1-2 year history of neck pain.  Recent MRI of the cervical spine without contrast on 4/9/2022 revealed diffuse cervical spondylosis and multilevel degenerative disc disease, and areas of central spinal canal stenosis as well as bilateral neuroforaminal stenosis. Patient has been diagnosed with rheumatoid arthritis by Dr. Vega.  He has a neurosurgical consultation with Dr. Steven Merida in May.  He would like to undergo neurosurgical consultation prior to further interventions.  I have discussed with him further recommendations and regarding his pain.  Patient has failed to obtain pain relief with conservative measures including oral analgesics and physical therapy. I have reviewed all available patient's medical records as well as previous therapies as referenced above. I had a lengthy conversation with . Philippe Mixon regarding his chronic pain condition and potential therapeutic options including risks, benefits, alternative therapies, to name a few. Therefore, I have proposed the following plan:  1. Pharmacological  measures: Reviewed and discussed;   A. Patient takes Celebrex.  Patient has declined additional phonological measures.  2. Interventional pain management measures: None indicated at this time.  Follow-up on as-needed basis.  If he would like to proceed with further interventional pain management measures I have recommended a cervical epidural steroid injection by interlaminar approach.  Otherwise, we may consider diagnostic and therapeutic bilateral greater occipital nerve blocks by hydrodissection technique under ultrasound and fluoroscopic guidance if his headaches continue to be bothersome.  3. Long-term rehabilitation efforts:  A. The patient does not have a history of falls. I did complete a risk assessment for falls  B. Patient will start a comprehensive physical therapy program for upper body strengthening/posture correction, core strengthening, gait and balance training, ultrasound, ASTYM, myofascial release, cupping and dry needling if pain recurs.  C. Start an exercise program such as yoga, water therapy and swimming  D. Contrast therapy: Apply ice-packs for 15-20 minutes, followed by heating pads for 15-20 minutes to affected area   4. The patient has been instructed to contact my office with any questions or difficulties. The patient understands the plan and agrees to proceed accordingly.        Patient Care Team:  Stephanie Livingston MD as PCP - General     No orders of the defined types were placed in this encounter.        Future Appointments   Date Time Provider Department Center   5/27/2022 10:40 AM Steven Merida MD [facility]   9/9/2022 11:15 AM Stephanie Livingston MD [facility]         MORGAN Rosales

## 2022-04-27 ENCOUNTER — OFFICE VISIT (OUTPATIENT)
Dept: PAIN MEDICINE | Facility: CLINIC | Age: 62
End: 2022-04-27

## 2022-04-27 VITALS
HEIGHT: 70 IN | HEART RATE: 56 BPM | OXYGEN SATURATION: 98 % | DIASTOLIC BLOOD PRESSURE: 81 MMHG | WEIGHT: 188.6 LBS | BODY MASS INDEX: 27 KG/M2 | SYSTOLIC BLOOD PRESSURE: 133 MMHG | TEMPERATURE: 97.1 F

## 2022-04-27 DIAGNOSIS — M48.02 CERVICAL SPINAL STENOSIS: ICD-10-CM

## 2022-04-27 DIAGNOSIS — M71.9 CERVICOTHORACIC INTERSPINOUS BURSITIS: ICD-10-CM

## 2022-04-27 DIAGNOSIS — M50.30 DDD (DEGENERATIVE DISC DISEASE), CERVICAL: ICD-10-CM

## 2022-04-27 DIAGNOSIS — M79.18 MYOFASCIAL PAIN: ICD-10-CM

## 2022-04-27 DIAGNOSIS — M47.812 CERVICAL SPONDYLOSIS WITHOUT MYELOPATHY: ICD-10-CM

## 2022-04-27 DIAGNOSIS — R51.9 OCCIPITAL HEADACHE: ICD-10-CM

## 2022-04-27 DIAGNOSIS — M05.9 RHEUMATOID ARTHRITIS WITH POSITIVE RHEUMATOID FACTOR, INVOLVING UNSPECIFIED SITE: ICD-10-CM

## 2022-04-27 PROCEDURE — 99213 OFFICE O/P EST LOW 20 MIN: CPT | Performed by: NURSE PRACTITIONER

## 2022-05-27 ENCOUNTER — OFFICE VISIT (OUTPATIENT)
Dept: NEUROSURGERY | Facility: CLINIC | Age: 62
End: 2022-05-27

## 2022-05-27 VITALS — RESPIRATION RATE: 16 BRPM | HEIGHT: 70 IN | WEIGHT: 180.4 LBS | BODY MASS INDEX: 25.83 KG/M2 | TEMPERATURE: 97.5 F

## 2022-05-27 DIAGNOSIS — M54.2 NECK PAIN: Primary | ICD-10-CM

## 2022-05-27 DIAGNOSIS — M50.30 DDD (DEGENERATIVE DISC DISEASE), CERVICAL: ICD-10-CM

## 2022-05-27 DIAGNOSIS — M47.819 FACET ARTHROPATHY: ICD-10-CM

## 2022-05-27 PROBLEM — R10.816 EPIGASTRIC ABDOMINAL TENDERNESS: Status: ACTIVE | Noted: 2022-05-27

## 2022-05-27 PROBLEM — S69.90XA FINGER INJURY: Status: ACTIVE | Noted: 2022-05-27

## 2022-05-27 PROCEDURE — 99203 OFFICE O/P NEW LOW 30 MIN: CPT | Performed by: NEUROLOGICAL SURGERY

## 2022-05-27 NOTE — PROGRESS NOTES
Patient: Philippe Mixon  : 1960    Primary Care Provider: Stephanie Livingston MD    Requesting Provider: As above        History    Chief Complaint: Neck pain.    History of Present Illness: Ms. 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  He complains of axial neck pain and headache.  He has no radicular arm symptoms.  He has undergone lumbar injections by Dr. Rodríguez.  He has associated thigh pain.  Dr. Hansen has done rhizotomies which helped for about 2-3 months and then his symptoms have recurred.  However, his headaches remain better after the rhizotomies.    Review of Systems   Constitutional: Negative for activity change, appetite change, chills, diaphoresis, fatigue, fever and unexpected weight change.   HENT: Negative for congestion, dental problem, drooling, ear discharge, ear pain, facial swelling, hearing loss, mouth sores, nosebleeds, postnasal drip, rhinorrhea, sinus pressure, sneezing, sore throat, tinnitus, trouble swallowing and voice change.    Eyes: Negative for photophobia, pain, discharge, redness, itching and visual disturbance.   Respiratory: Negative for apnea, cough, choking, chest tightness, shortness of breath, wheezing and stridor.    Cardiovascular: Negative for chest pain, palpitations and leg swelling.   Gastrointestinal: Negative for abdominal distention, abdominal pain, anal bleeding, blood in stool, constipation, diarrhea, nausea, rectal pain and vomiting.   Endocrine: Negative for cold intolerance, heat intolerance, polydipsia, polyphagia and polyuria.   Genitourinary: Negative for decreased urine volume, difficulty urinating, dysuria, enuresis, flank pain, frequency, genital sores, hematuria and urgency.   Musculoskeletal: Positive for neck pain and neck stiffness. Negative for arthralgias, back pain, gait problem, joint swelling and myalgias.   Skin: Negative for color  "change, pallor, rash and wound.   Allergic/Immunologic: Negative for environmental allergies, food allergies and immunocompromised state.   Neurological: Positive for dizziness and headaches. Negative for tremors, seizures, syncope, facial asymmetry, speech difficulty, weakness, light-headedness and numbness.   Hematological: Negative for adenopathy. Does not bruise/bleed easily.   Psychiatric/Behavioral: Negative for agitation, behavioral problems, confusion, decreased concentration, dysphoric mood, hallucinations, self-injury, sleep disturbance and suicidal ideas. The patient is not nervous/anxious and is not hyperactive.    All other systems reviewed and are negative.      The patient's past medical history, past surgical history, family history, and social history have been reviewed at length in the electronic medical record.      Physical Exam:   Temp 97.5 °F (36.4 °C) (Infrared)   Resp 16   Ht 177.8 cm (70\")   Wt 81.8 kg (180 lb 6.4 oz)   BMI 25.88 kg/m²   CONSTITUTIONAL: Patient is well-nourished, pleasant and appears stated age.  MUSCULOSKELETAL:  Neck tenderness to palpation is not observed.   ROM in the neck is quite limited in all directions.  NEUROLOGICAL:  Orientation, memory, attention span, language function, and cognition have been examined and are intact.  Strength is intact in the upper and lower extremities to direct testing.  Muscle tone is normal throughout.  Station and gait are normal.  Sensation is intact to light touch testing throughout.  Deep tendon reflexes are 1+ and symmetrical.  Ramakrishna's Sign is negative bilaterally. No clonus is elicited.  Coordination is intact.      Medical Decision Making    Data Review:   (All imaging studies were personally reviewed unless stated otherwise)  MRI of the cervical spine dated 4/9/2022 demonstrates some diffuse degenerative disc disease and facet arthropathy.  There is some disc bulging to the left at C6-7.  Prominent right-sided disc " protrusion is noted at T1-2.  There is no cord compromise.    Diagnosis:   1.  Mechanical neck pain.  2.  Cervical degenerative disc disease.  3.  Cervical spondylosis.    Treatment Options:   Currently, there is no role for surgical intervention and treatment will need to remain symptomatic.  I have reviewed his MRI images with him and explained the natural history of his condition.       Diagnosis Plan   1. Neck pain     2. Facet arthropathy     3. DDD (degenerative disc disease), cervical         Scribed for Steven Merida MD by Radha Loco CMA on 5/27/2022 11:36 EDT       I, Dr. Merida, personally performed the services described in the documentation, as scribed in my presence, and it is both accurate and complete.

## 2022-09-09 ENCOUNTER — OFFICE VISIT (OUTPATIENT)
Dept: INTERNAL MEDICINE | Facility: CLINIC | Age: 62
End: 2022-09-09

## 2022-09-09 ENCOUNTER — LAB (OUTPATIENT)
Dept: LAB | Facility: HOSPITAL | Age: 62
End: 2022-09-09

## 2022-09-09 VITALS
HEART RATE: 58 BPM | TEMPERATURE: 97.3 F | HEIGHT: 70 IN | OXYGEN SATURATION: 98 % | SYSTOLIC BLOOD PRESSURE: 128 MMHG | WEIGHT: 182 LBS | BODY MASS INDEX: 26.05 KG/M2 | DIASTOLIC BLOOD PRESSURE: 76 MMHG

## 2022-09-09 DIAGNOSIS — E03.8 OTHER SPECIFIED HYPOTHYROIDISM: ICD-10-CM

## 2022-09-09 DIAGNOSIS — M05.9 RHEUMATOID ARTHRITIS WITH POSITIVE RHEUMATOID FACTOR, INVOLVING UNSPECIFIED SITE: Primary | ICD-10-CM

## 2022-09-09 DIAGNOSIS — E78.5 DYSLIPIDEMIA: ICD-10-CM

## 2022-09-09 DIAGNOSIS — R97.20 PSA ELEVATION: ICD-10-CM

## 2022-09-09 DIAGNOSIS — N52.9 ERECTILE DYSFUNCTION, UNSPECIFIED ERECTILE DYSFUNCTION TYPE: ICD-10-CM

## 2022-09-09 DIAGNOSIS — R73.01 IFG (IMPAIRED FASTING GLUCOSE): ICD-10-CM

## 2022-09-09 DIAGNOSIS — Z80.42 FAMILY HISTORY OF PROSTATE CANCER: ICD-10-CM

## 2022-09-09 PROCEDURE — 99214 OFFICE O/P EST MOD 30 MIN: CPT | Performed by: INTERNAL MEDICINE

## 2022-09-09 RX ORDER — VITAMIN B COMPLEX
CAPSULE ORAL DAILY
COMMUNITY

## 2022-09-09 RX ORDER — SILDENAFIL 100 MG/1
100 TABLET, FILM COATED ORAL DAILY PRN
Qty: 30 TABLET | Refills: 3 | Status: SHIPPED | OUTPATIENT
Start: 2022-09-09

## 2022-09-09 NOTE — PROGRESS NOTES
Hypothyroidism    Subjective   Philippe Mixon is a 62 y.o. male is here today for follow-up.    History of Present Illness   The patient presents today for a 6 month follow up. He is accompanied by an adult female.     He complains of pain in his shoulders, neck, upper and lower back, and his knees. He denies any improvement in his symptoms. He notes the methotrexate makes him ill, he notes hot flashes and nausea. He has been taking the methotrexate for a couple of years, he was diagnosed with rheumatoid arthritis. The patient states he is unsure if he wants to change his medication. He notes pain in his hands and feet as well. He takes folic acid and Celebrex. There is no edema or redness present. He states he cannot turn his head. The patient reports the pain limits his daily activities.     His blood pressure upon recheck is 128/76 mmHg. He notes he was holding his urine when his blood pressure was checked upon arrival.     The patient inquires about a handicap sticker.     The patient reports an improvement in his acid reflux, he notes he is tolerating the medication well.     His prostate enzyme increased on his last set of labs from 1.9-2.6, and then to 3.2. His father has a history of prostate cancer. He is taking saw palmetto.       Current Outpatient Medications:   •  B Complex Vitamins (vitamin b complex) capsule capsule, Take  by mouth Daily., Disp: , Rfl:   •  celecoxib (CeleBREX) 200 MG capsule, Take 1 capsule by mouth Daily. Replaces ibuprofen, Disp: 90 capsule, Rfl: 3  •  Cholecalciferol 25 MCG (1000 UT) capsule, Take 1 capsule by mouth Daily., Disp: , Rfl:   •  folic acid (FOLVITE) 1 MG tablet, Take 1 tablet by mouth Daily., Disp: , Rfl:   •  levothyroxine (SYNTHROID, LEVOTHROID) 75 MCG tablet, Take 1 tablet by mouth Daily., Disp: 90 tablet, Rfl: 3  •  methotrexate 2.5 MG tablet, Take 6 tablets by mouth 1 (One) Time Per Week., Disp: , Rfl:   •  omeprazole (priLOSEC) 20 MG capsule, Take 1 capsule by  "mouth Daily., Disp: 90 capsule, Rfl: 3  •  sildenafil (VIAGRA) 100 MG tablet, Take 1 tablet by mouth Daily As Needed for Erectile Dysfunction., Disp: 30 tablet, Rfl: 3      The following portions of the patient's history were reviewed and updated as appropriate: allergies, current medications, past family history, past medical history, past social history, past surgical history and problem list.    Review of Systems   Constitutional: Negative.  Negative for chills and fever.   HENT: Negative for ear discharge, ear pain, sinus pressure and sore throat.    Respiratory: Negative for cough, chest tightness and shortness of breath.    Cardiovascular: Negative for chest pain, palpitations and leg swelling.   Gastrointestinal: Negative for diarrhea, nausea and vomiting.   Musculoskeletal: Positive for arthralgias and myalgias. Negative for back pain.   Neurological: Negative for dizziness, syncope and headaches.   Psychiatric/Behavioral: Negative for confusion and sleep disturbance.       Objective   /76   Pulse 58   Temp 97.3 °F (36.3 °C)   Ht 177.8 cm (70\")   Wt 82.6 kg (182 lb)   SpO2 98% Comment: ra  BMI 26.11 kg/m²   Physical Exam  Vitals and nursing note reviewed.   Constitutional:       Appearance: He is well-developed.   HENT:      Head: Normocephalic and atraumatic.      Mouth/Throat:      Pharynx: No oropharyngeal exudate.   Eyes:      Conjunctiva/sclera: Conjunctivae normal.      Pupils: Pupils are equal, round, and reactive to light.   Neck:      Thyroid: No thyromegaly.   Cardiovascular:      Rate and Rhythm: Normal rate and regular rhythm.      Pulses: Normal pulses.      Heart sounds: Normal heart sounds. No murmur heard.    No friction rub. No gallop.   Pulmonary:      Effort: Pulmonary effort is normal.      Breath sounds: Normal breath sounds.   Abdominal:      General: Bowel sounds are normal. There is no distension.      Palpations: Abdomen is soft.      Tenderness: There is no abdominal " tenderness.   Musculoskeletal:         General: Tenderness (multiple joints) present. No deformity.      Cervical back: Neck supple.   Skin:     General: Skin is warm and dry.   Neurological:      Mental Status: He is alert and oriented to person, place, and time.      Cranial Nerves: No cranial nerve deficit.   Psychiatric:         Judgment: Judgment normal.           Results for orders placed or performed in visit on 03/16/22   Hemoglobin A1c    Specimen: Blood   Result Value Ref Range    Hemoglobin A1C 5.40 4.80 - 5.60 %   PSA Screen    Specimen: Blood   Result Value Ref Range    PSA 3.200 0.000 - 4.000 ng/mL   CBC (No Diff)    Specimen: Blood   Result Value Ref Range    WBC 6.52 3.40 - 10.80 10*3/mm3    RBC 5.12 4.14 - 5.80 10*6/mm3    Hemoglobin 15.9 13.0 - 17.7 g/dL    Hematocrit 47.3 37.5 - 51.0 %    MCV 92.4 79.0 - 97.0 fL    MCH 31.1 26.6 - 33.0 pg    MCHC 33.6 31.5 - 35.7 g/dL    RDW 13.2 12.3 - 15.4 %    RDW-SD 43.5 37.0 - 54.0 fl    MPV 9.6 6.0 - 12.0 fL    Platelets 303 140 - 450 10*3/mm3   Comprehensive Metabolic Panel    Specimen: Blood   Result Value Ref Range    Glucose 102 (H) 65 - 99 mg/dL    BUN 21 8 - 23 mg/dL    Creatinine 0.99 0.76 - 1.27 mg/dL    Sodium 141 136 - 145 mmol/L    Potassium 5.1 3.5 - 5.2 mmol/L    Chloride 104 98 - 107 mmol/L    CO2 27.8 22.0 - 29.0 mmol/L    Calcium 9.9 8.6 - 10.5 mg/dL    Total Protein 7.4 6.0 - 8.5 g/dL    Albumin 4.90 3.50 - 5.20 g/dL    ALT (SGPT) 31 1 - 41 U/L    AST (SGOT) 29 1 - 40 U/L    Alkaline Phosphatase 86 39 - 117 U/L    Total Bilirubin 0.3 0.0 - 1.2 mg/dL    Globulin 2.5 gm/dL    A/G Ratio 2.0 g/dL    BUN/Creatinine Ratio 21.2 7.0 - 25.0    Anion Gap 9.2 5.0 - 15.0 mmol/L    eGFR 87.2 >60.0 mL/min/1.73   Lipid Panel    Specimen: Blood   Result Value Ref Range    Total Cholesterol 139 0 - 200 mg/dL    Triglycerides 66 0 - 150 mg/dL    HDL Cholesterol 65 (H) 40 - 60 mg/dL    LDL Cholesterol  61 0 - 100 mg/dL    VLDL Cholesterol 13 5 - 40 mg/dL     LDL/HDL Ratio 0.94    TSH    Specimen: Blood   Result Value Ref Range    TSH 2.440 0.270 - 4.200 uIU/mL   Vitamin D 25 Hydroxy    Specimen: Blood   Result Value Ref Range    25 Hydroxy, Vitamin D 46.6 30.0 - 100.0 ng/ml   Vitamin B12    Specimen: Blood   Result Value Ref Range    Vitamin B-12 824 211 - 946 pg/mL   Uric Acid    Specimen: Blood   Result Value Ref Range    Uric Acid 5.1 3.4 - 7.0 mg/dL             Assessment & Plan   Diagnoses and all orders for this visit:    Rheumatoid arthritis with positive rheumatoid factor, involving unspecified site (HCC)    Erectile dysfunction, unspecified erectile dysfunction type  -     sildenafil (VIAGRA) 100 MG tablet; Take 1 tablet by mouth Daily As Needed for Erectile Dysfunction.    Other specified hypothyroidism  -     TSH; Future  -     T4, Free; Future    IFG (impaired fasting glucose)  -     Hemoglobin A1c; Future    Dyslipidemia    PSA elevation  -     PSA DIAGNOSTIC; Future    Family history of prostate cancer  -     PSA DIAGNOSTIC; Future    Other orders  -     B Complex Vitamins (vitamin b complex) capsule capsule; Take  by mouth Daily.      2. Thyroid  - Check thyroid panel  3. Rheumatoid arthritis  - Recommend he soak in [employer] salt baths  - Recommend he discuss his current medications with his physician  - Form for handicap parking filled and given to patient  4. Family history of prostate cancer  - Due to an increase in the patient's PSA, as well as a family history of prostate cancer, will recheck PSA today.             Return in about 6 months (around 3/9/2023) for Medicare Wellness.    Electronically signed by:    Stephanie Livingston MD     Transcribed from ambient dictation for Stephanie Livingston MD by Nasreen Shah.  09/09/22   14:05 EDT    Patient verbalized consent to the visit recording.  I have personally performed the services described in this document as transcribed by the above individual, and it is both accurate and complete.  Stephanie Livingston MD   9/9/2022  16:41 EDT

## 2022-09-10 LAB
HBA1C MFR BLD: 5.7 % (ref 4.8–5.6)
PSA SERPL-MCNC: 3.03 NG/ML (ref 0–4)
T4 FREE SERPL-MCNC: 1.27 NG/DL (ref 0.93–1.7)
TSH SERPL DL<=0.005 MIU/L-ACNC: 1.3 UIU/ML (ref 0.27–4.2)

## 2023-01-14 DIAGNOSIS — N52.9 ERECTILE DYSFUNCTION, UNSPECIFIED ERECTILE DYSFUNCTION TYPE: ICD-10-CM

## 2023-01-14 RX ORDER — SILDENAFIL 100 MG/1
TABLET, FILM COATED ORAL
Qty: 30 TABLET | Refills: 3 | OUTPATIENT
Start: 2023-01-14

## 2023-02-09 RX ORDER — SODIUM, POTASSIUM,MAG SULFATES 17.5-3.13G
SOLUTION, RECONSTITUTED, ORAL ORAL
Qty: 354 ML | Refills: 0 | Status: SHIPPED | OUTPATIENT
Start: 2023-02-09

## 2023-02-20 ENCOUNTER — TELEPHONE (OUTPATIENT)
Dept: GASTROENTEROLOGY | Facility: CLINIC | Age: 63
End: 2023-02-20
Payer: MEDICARE

## 2023-02-20 NOTE — TELEPHONE ENCOUNTER
PATIENT CALLED IN AND WISHED TO CHANGE HIS PHARMACY TO THE [facility] IN [place] AND ASKED THAT HIS BOWEL PREP BE CALLED IN THERE INSTEAD.     I CALLED SUPREP BOWEL PREP INTO HIS NEW PHARMACY AND SPOKE WITH LEONARD CHEN - PHARMACIST.

## 2023-02-24 ENCOUNTER — OUTSIDE FACILITY SERVICE (OUTPATIENT)
Dept: GASTROENTEROLOGY | Facility: CLINIC | Age: 63
End: 2023-02-24
Payer: MEDICARE

## 2023-02-24 PROCEDURE — G0105 COLORECTAL SCRN; HI RISK IND: HCPCS | Performed by: INTERNAL MEDICINE

## 2023-04-09 DIAGNOSIS — G89.29 CHRONIC PAIN OF MULTIPLE JOINTS: ICD-10-CM

## 2023-04-09 DIAGNOSIS — M25.50 CHRONIC PAIN OF MULTIPLE JOINTS: ICD-10-CM

## 2023-04-10 RX ORDER — CELECOXIB 200 MG/1
CAPSULE ORAL
Qty: 90 CAPSULE | Refills: 3 | Status: SHIPPED | OUTPATIENT
Start: 2023-04-10

## 2023-04-14 ENCOUNTER — LAB (OUTPATIENT)
Dept: LAB | Facility: HOSPITAL | Age: 63
End: 2023-04-14
Payer: MEDICARE

## 2023-04-14 ENCOUNTER — OFFICE VISIT (OUTPATIENT)
Dept: INTERNAL MEDICINE | Facility: CLINIC | Age: 63
End: 2023-04-14
Payer: MEDICARE

## 2023-04-14 VITALS
BODY MASS INDEX: 27.03 KG/M2 | WEIGHT: 188.8 LBS | TEMPERATURE: 97.5 F | DIASTOLIC BLOOD PRESSURE: 76 MMHG | OXYGEN SATURATION: 95 % | HEART RATE: 50 BPM | SYSTOLIC BLOOD PRESSURE: 128 MMHG | HEIGHT: 70 IN

## 2023-04-14 DIAGNOSIS — E55.9 VITAMIN D DEFICIENCY: ICD-10-CM

## 2023-04-14 DIAGNOSIS — E78.5 DYSLIPIDEMIA: ICD-10-CM

## 2023-04-14 DIAGNOSIS — K21.00 GASTROESOPHAGEAL REFLUX DISEASE WITH ESOPHAGITIS WITHOUT HEMORRHAGE: ICD-10-CM

## 2023-04-14 DIAGNOSIS — N52.9 ERECTILE DYSFUNCTION, UNSPECIFIED ERECTILE DYSFUNCTION TYPE: ICD-10-CM

## 2023-04-14 DIAGNOSIS — E03.8 OTHER SPECIFIED HYPOTHYROIDISM: ICD-10-CM

## 2023-04-14 DIAGNOSIS — Z00.00 WELCOME TO MEDICARE PREVENTIVE VISIT: Primary | ICD-10-CM

## 2023-04-14 DIAGNOSIS — M05.9 RHEUMATOID ARTHRITIS WITH POSITIVE RHEUMATOID FACTOR, INVOLVING UNSPECIFIED SITE: ICD-10-CM

## 2023-04-14 DIAGNOSIS — R97.20 PSA ELEVATION: ICD-10-CM

## 2023-04-14 DIAGNOSIS — Z23 ENCOUNTER FOR IMMUNIZATION: ICD-10-CM

## 2023-04-14 DIAGNOSIS — R73.01 IFG (IMPAIRED FASTING GLUCOSE): ICD-10-CM

## 2023-04-14 LAB
25(OH)D3+25(OH)D2 SERPL-MCNC: 47.4 NG/ML (ref 30–100)
ALBUMIN SERPL-MCNC: 4.8 G/DL (ref 3.5–5.2)
ALBUMIN/GLOB SERPL: 2.3 G/DL
ALP SERPL-CCNC: 66 U/L (ref 39–117)
ALT SERPL-CCNC: 27 U/L (ref 1–41)
AST SERPL-CCNC: 22 U/L (ref 1–40)
BASOPHILS # BLD AUTO: 0.05 10*3/MM3 (ref 0–0.2)
BASOPHILS NFR BLD AUTO: 0.9 % (ref 0–1.5)
BILIRUB SERPL-MCNC: 0.5 MG/DL (ref 0–1.2)
BUN SERPL-MCNC: 22 MG/DL (ref 8–23)
BUN/CREAT SERPL: 19.8 (ref 7–25)
CALCIUM SERPL-MCNC: 9.8 MG/DL (ref 8.6–10.5)
CHLORIDE SERPL-SCNC: 104 MMOL/L (ref 98–107)
CHOLEST SERPL-MCNC: 169 MG/DL (ref 0–200)
CO2 SERPL-SCNC: 28.1 MMOL/L (ref 22–29)
CREAT SERPL-MCNC: 1.11 MG/DL (ref 0.76–1.27)
CRP SERPL-MCNC: <0.3 MG/DL (ref 0–0.5)
EGFRCR SERPLBLD CKD-EPI 2021: 75.5 ML/MIN/1.73
EOSINOPHIL # BLD AUTO: 0.09 10*3/MM3 (ref 0–0.4)
EOSINOPHIL NFR BLD AUTO: 1.7 % (ref 0.3–6.2)
ERYTHROCYTE [DISTWIDTH] IN BLOOD BY AUTOMATED COUNT: 13.5 % (ref 12.3–15.4)
ERYTHROCYTE [SEDIMENTATION RATE] IN BLOOD BY WESTERGREN METHOD: 2 MM/HR (ref 0–20)
GLOBULIN SER CALC-MCNC: 2.1 GM/DL
GLUCOSE SERPL-MCNC: 86 MG/DL (ref 65–99)
HBA1C MFR BLD: 5.2 % (ref 4.8–5.6)
HCT VFR BLD AUTO: 44.1 % (ref 37.5–51)
HDLC SERPL-MCNC: 74 MG/DL (ref 40–60)
HGB BLD-MCNC: 14.6 G/DL (ref 13–17.7)
IMM GRANULOCYTES # BLD AUTO: 0.01 10*3/MM3 (ref 0–0.05)
IMM GRANULOCYTES NFR BLD AUTO: 0.2 % (ref 0–0.5)
LDLC SERPL CALC-MCNC: 81 MG/DL (ref 0–100)
LYMPHOCYTES # BLD AUTO: 1.36 10*3/MM3 (ref 0.7–3.1)
LYMPHOCYTES NFR BLD AUTO: 25.2 % (ref 19.6–45.3)
MCH RBC QN AUTO: 31.3 PG (ref 26.6–33)
MCHC RBC AUTO-ENTMCNC: 33.1 G/DL (ref 31.5–35.7)
MCV RBC AUTO: 94.4 FL (ref 79–97)
MONOCYTES # BLD AUTO: 0.51 10*3/MM3 (ref 0.1–0.9)
MONOCYTES NFR BLD AUTO: 9.4 % (ref 5–12)
NEUTROPHILS # BLD AUTO: 3.38 10*3/MM3 (ref 1.7–7)
NEUTROPHILS NFR BLD AUTO: 62.6 % (ref 42.7–76)
NRBC BLD AUTO-RTO: 0 /100 WBC (ref 0–0.2)
PLATELET # BLD AUTO: 253 10*3/MM3 (ref 140–450)
POTASSIUM SERPL-SCNC: 4.5 MMOL/L (ref 3.5–5.2)
PROT SERPL-MCNC: 6.9 G/DL (ref 6–8.5)
PSA SERPL-MCNC: 3.13 NG/ML (ref 0–4)
RBC # BLD AUTO: 4.67 10*6/MM3 (ref 4.14–5.8)
SODIUM SERPL-SCNC: 140 MMOL/L (ref 136–145)
TRIGL SERPL-MCNC: 74 MG/DL (ref 0–150)
TSH SERPL DL<=0.005 MIU/L-ACNC: 2.32 UIU/ML (ref 0.27–4.2)
VIT B12 SERPL-MCNC: 769 PG/ML (ref 211–946)
VLDLC SERPL CALC-MCNC: 14 MG/DL (ref 5–40)
WBC # BLD AUTO: 5.4 10*3/MM3 (ref 3.4–10.8)

## 2023-04-14 PROCEDURE — 1159F MED LIST DOCD IN RCRD: CPT | Performed by: INTERNAL MEDICINE

## 2023-04-14 PROCEDURE — 1160F RVW MEDS BY RX/DR IN RCRD: CPT | Performed by: INTERNAL MEDICINE

## 2023-04-14 PROCEDURE — 99396 PREV VISIT EST AGE 40-64: CPT | Performed by: INTERNAL MEDICINE

## 2023-04-14 PROCEDURE — G0402 INITIAL PREVENTIVE EXAM: HCPCS | Performed by: INTERNAL MEDICINE

## 2023-04-14 PROCEDURE — G0009 ADMIN PNEUMOCOCCAL VACCINE: HCPCS | Performed by: INTERNAL MEDICINE

## 2023-04-14 PROCEDURE — 90677 PCV20 VACCINE IM: CPT | Performed by: INTERNAL MEDICINE

## 2023-04-14 PROCEDURE — G0403 EKG FOR INITIAL PREVENT EXAM: HCPCS | Performed by: INTERNAL MEDICINE

## 2023-04-14 PROCEDURE — 96160 PT-FOCUSED HLTH RISK ASSMT: CPT | Performed by: INTERNAL MEDICINE

## 2023-04-14 PROCEDURE — 82270 OCCULT BLOOD FECES: CPT | Performed by: INTERNAL MEDICINE

## 2023-04-14 RX ORDER — OMEPRAZOLE 20 MG/1
20 CAPSULE, DELAYED RELEASE ORAL DAILY
Qty: 90 CAPSULE | Refills: 3 | Status: SHIPPED | OUTPATIENT
Start: 2023-04-14

## 2023-04-14 RX ORDER — SILDENAFIL 100 MG/1
100 TABLET, FILM COATED ORAL DAILY PRN
Qty: 30 TABLET | Refills: 3 | Status: SHIPPED | OUTPATIENT
Start: 2023-04-14

## 2023-04-14 RX ORDER — LEVOTHYROXINE SODIUM 0.07 MG/1
75 TABLET ORAL DAILY
Qty: 90 TABLET | Refills: 3 | Status: SHIPPED | OUTPATIENT
Start: 2023-04-14

## 2023-04-14 RX ORDER — AZELASTINE 1 MG/ML
SPRAY, METERED NASAL EVERY 12 HOURS SCHEDULED
COMMUNITY

## 2023-04-14 NOTE — PROGRESS NOTES
The ABCs of the Annual Wellness Visit  [place] to Medicare Visit    Subjective     Philippe Mixon is a 62 y.o. male who presents for a  Welcome to Medicare Visit.    The following portions of the patient's history were reviewed and   updated as appropriate: allergies, current medications, past family history, past medical history, past social history, past surgical history and problem list.     Compared to one year ago, the patient feels his physical   health is worse- HA and neck pain.    Compared to one year ago, the patient feels his mental   health is the same.    Recent Hospitalizations:  He was not admitted to the hospital during the last year.       Current Medical Providers:  Patient Care Team:  Stephanie Livingston MD as PCP - General    Outpatient Medications Prior to Visit   Medication Sig Dispense Refill   • B Complex Vitamins (vitamin b complex) capsule capsule Take  by mouth Daily.     • celecoxib (CeleBREX) 200 MG capsule TAKE ONE CAPSULE BY MOUTH DAILY *REPLACES IBUPROFEN* 90 capsule 3   • Cholecalciferol 25 MCG (1000 UT) capsule Take 1 capsule by mouth Daily.     • folic acid (FOLVITE) 1 MG tablet Take 2 tablets by mouth Daily.     • methotrexate 2.5 MG tablet Take 8 tablets by mouth 1 (One) Time Per Week.     • levothyroxine (SYNTHROID, LEVOTHROID) 75 MCG tablet Take 1 tablet by mouth Daily. 90 tablet 3   • omeprazole (priLOSEC) 20 MG capsule Take 1 capsule by mouth Daily. 90 capsule 3   • sildenafil (VIAGRA) 100 MG tablet Take 1 tablet by mouth Daily As Needed for Erectile Dysfunction. 30 tablet 3   • azelastine (ASTELIN) 0.1 % nasal spray Every 12 (Twelve) Hours.     • sodium-potassium-magnesium sulfates (Suprep Bowel Prep Kit) 17.5-3.13-1.6 GM/177ML solution oral solution Use as directed by provider for colonoscopy prep 354 mL 0     No facility-administered medications prior to visit.       No opioid medication identified on active medication list. I have reviewed chart for other potential  high  "risk medication/s and harmful drug interactions in the elderly.          Aspirin is not on active medication list.  Aspirin use is not indicated based on review of current medical condition/s. Risk of harm outweighs potential benefits.  .    Patient Active Problem List   Diagnosis   • Esophageal reflux   • Hyperglycemia   • Hypothyroidism   • Vitamin D deficiency   • Duodenitis   • Dyslipidemia   • Family history of prostate cancer in father   • Cervical spondylosis without myelopathy   • DDD (degenerative disc disease), cervical   • Rheumatoid arthritis with positive rheumatoid factor   • Myofascial pain   • Occipital headache   • Insomnia due to medical condition   • Cervicothoracic interspinous bursitis   • Deviated nasal septum   • Epigastric abdominal tenderness   • Finger injury   • Impacted cerumen   • Retinal defect     Advance Care Planning   Advance Care Planning     Advance Directive is not on file.  ACP discussion was held with the patient during this visit. Patient does not have an advance directive, information provided.       Objective   Vitals:    04/14/23 0736   BP: 128/76   Pulse: 50   Temp: 97.5 °F (36.4 °C)   SpO2: 95%  Comment: ra   Weight: 85.6 kg (188 lb 12.8 oz)   Height: 177.8 cm (70\")   PainSc:   6   PainLoc: Generalized     Estimated body mass index is 27.09 kg/m² as calculated from the following:    Height as of this encounter: 177.8 cm (70\").    Weight as of this encounter: 85.6 kg (188 lb 12.8 oz).    BMI is >= 25 and <30. (Overweight) The following options were offered after discussion;: exercise counseling/recommendations and nutrition counseling/recommendations      Does the patient have evidence of cognitive impairment?   No           ECG 12 Lead    Date/Time: 4/14/2023 9:01 AM  Performed by: Stephanie Livingston MD  Authorized by: Stephanie Livingston MD   Comparison: not compared with previous ECG   Previous ECG: no previous ECG available  Rhythm: sinus rhythm  Rate: normal  Conduction: " conduction normal  ST Segments: ST segments normal  T Waves: T waves normal  QRS axis: normal  Other: no other findings    Clinical impression: normal ECG               HEALTH RISK ASSESSMENT    Smoking Status:  Social History     Tobacco Use   Smoking Status Former   • Packs/day: 1.50   • Years: 15.00   • Pack years: 22.50   • Types: Cigarettes   • Quit date: 1990   • Years since quittin.4   Smokeless Tobacco Never     Alcohol Consumption:  Social History     Substance and Sexual Activity   Alcohol Use No       Fall Risk Screen:    KASEYADI Fall Risk Assessment was completed, and patient is at LOW risk for falls.Assessment completed on:2023    Depression Screen:       2023     7:46 AM   PHQ-2/PHQ-9 Depression Screening   Little Interest or Pleasure in Doing Things 0-->not at all   Feeling Down, Depressed or Hopeless 0-->not at all   PHQ-9: Brief Depression Severity Measure Score 0       Health Habits and Functional and Cognitive Screenin/14/2023     7:45 AM   Functional & Cognitive Status   Do you have difficulty preparing food and eating? No   Do you have difficulty bathing yourself, getting dressed or grooming yourself? No   Do you have difficulty using the toilet? No   Do you have difficulty moving around from place to place? No   Do you have trouble with steps or getting out of a bed or a chair? No   Current Diet Well Balanced Diet   Dental Exam Up to date   Eye Exam Up to date   Exercise (times per week) 3 times per week   Current Exercises Include Light Weights;Home Exercise Program (TV, Computer, Etc.)   Do you need help using the phone?  No   Are you deaf or do you have serious difficulty hearing?  No   Do you need help with transportation? No   Do you need help shopping? No   Do you need help preparing meals?  No   Do you need help with housework?  No   Do you need help with laundry? No   Do you need help taking your medications? No   Do you need help managing money? No   Do you  ever drive or ride in a car without wearing a seat belt? No   Have you felt unusual stress, anger or loneliness in the last month? No   Who do you live with? Spouse   If you need help, do you have trouble finding someone available to you? No   Have you been bothered in the last four weeks by sexual problems? No   Do you have difficulty concentrating, remembering or making decisions? No       Visual Acuity:    Vision Screening    Right eye Left eye Both eyes   Without correction 20/40 20/20 20/20   With correction          Age-appropriate Screening Schedule:  Refer to the list below for future screening recommendations based on patient's age, sex and/or medical conditions. Orders for these recommended tests are listed in the plan section. The patient has been provided with a written plan.    Health Maintenance   Topic Date Due   • ZOSTER VACCINE (1 of 2) Never done   • COVID-19 Vaccine (4 - Booster for Pfizer series) 04/16/2023 (Originally 5/4/2022)   • INFLUENZA VACCINE  08/01/2023   • COLORECTAL CANCER SCREENING  02/27/2024   • ANNUAL WELLNESS VISIT  04/14/2024   • TDAP/TD VACCINES (3 - Td or Tdap) 09/14/2025   • HEPATITIS C SCREENING  Completed   • Pneumococcal Vaccine 0-64  Aged Out        CMS Preventative Services Quick Reference  Risk Factors Identified During Encounter    Chronic Pain: Home exercise plan outlined.  Pain Management Referral Ordered  Depression/Dysphoria: stable  Immunizations Discussed/Encouraged: Shingrix and Prevnar 20  The above risks/problems have been discussed with the patient.  Pertinent information has been shared with the patient in the After Visit Summary.    Follow Up:   Initial Medicare Visit in one year    An After Visit Summary and PPPS were made available to the patient.      Additional E&M Note during same encounter follows:  Patient has multiple medical problems which are significant and separately identifiable that require additional work above and beyond the Medicare Wellness  Visit.      Chief Complaint  Welcome To Medicare    Subjective        HPI  Philippe Mixon is also being seen today for here for a follow up on his htn, gerd and hlp. C/o neck back, arm and shoulder pain. Knee is better.    The patient presents today for a welcome to Medicare exam.    He reports he is trying to see a rheumatologist, MORGAN De Dios. He needs bloodwork for his follow-up 05/2023.    He is not seeing a urologist. He had a colonoscopy 1 month ago.    He is starting to get headaches again. He has had 4 or 5 different procedures on his neck, the headaches went away. He notes the headaches are not bad right now. He can feel the headaches coming on.     He has seen a pain management doctor. He has received injections in his neck and lower back. He notes he had 2 different injections in his lower back at that time. His lower back still bothers him quite a bit. His leg is still numb. He was told by Dr. Garcia that he had bone spurs throughout his entire back and could not offer surgical intervention.    He tries to do a little bit of exercising 3 to 4 days a week. He does sit-ups and push-ups. He is unable to do a regular push-ups. He has to be on his knees with his arms. 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 He notes the other day he overdone it. He was hurting really bad that night. He could not sleep or move his arms. He notes it is starting to move now.      He has 2 types of arthritis. His arthritis doctor had him on 6 methotrexate. She raised him up to 8 once a week. She wants to try that to see if that does any better. If that does not, they will go to something else. His shoulders are now having some issues there. He notes they hurt all the time. He takes 2 folic acids now instead of 1.     He has never had a pneumonia vaccine. He has had shingles once before, he has not received the vaccine.    He is inquiring about getting a medical marijuana card to see if it will help him with his pain.     He  "denies any chest pain, shortness of breath, nausea, vomiting, or diarrhea. He has numbness and tingling in his feet and hands. He is not on any medication for the numbness.    He denies any depression and anxiety issues.    He has a small hemorrhoid. He denies blood in his stool.    He sees the eye doctor every year.      Review of Systems   Constitutional: Negative for chills and fever.   HENT: Negative for congestion, ear pain and sore throat.    Eyes: Negative for pain, redness and visual disturbance.   Respiratory: Negative for cough and shortness of breath.    Cardiovascular: Negative for chest pain, palpitations and leg swelling.   Gastrointestinal: Negative for abdominal pain, diarrhea and nausea.   Endocrine: Negative for cold intolerance and heat intolerance.   Genitourinary: Negative for flank pain and urgency.   Musculoskeletal: Positive for arthralgias, back pain, myalgias and neck pain. Negative for gait problem.   Skin: Negative for pallor and rash.   Neurological: Negative for dizziness and weakness.   Psychiatric/Behavioral: Negative for dysphoric mood and sleep disturbance. The patient is not nervous/anxious.        Objective   Vital Signs:  /76   Pulse 50   Temp 97.5 °F (36.4 °C)   Ht 177.8 cm (70\")   Wt 85.6 kg (188 lb 12.8 oz)   SpO2 95% Comment: ra  BMI 27.09 kg/m²     Physical Exam  Constitutional:       General: He is not in acute distress.     Appearance: Normal appearance.   HENT:      Head: Normocephalic and atraumatic.      Right Ear: Tympanic membrane and external ear normal.      Left Ear: Tympanic membrane and external ear normal.      Nose: Nose normal.      Mouth/Throat:      Mouth: Mucous membranes are moist.   Eyes:      General: No scleral icterus.  Neck:      Vascular: No carotid bruit.   Cardiovascular:      Rate and Rhythm: Normal rate and regular rhythm.      Pulses: Normal pulses.      Heart sounds: Normal heart sounds. No murmur heard.    No friction rub. No " gallop.   Pulmonary:      Effort: Pulmonary effort is normal.      Breath sounds: Normal breath sounds. No rhonchi or rales.   Abdominal:      General: Bowel sounds are normal. There is no distension.      Palpations: Abdomen is soft.      Tenderness: There is no right CVA tenderness, left CVA tenderness, guarding or rebound.      Hernia: No hernia is present.   Genitourinary:     Rectum: Guaiac result negative.      Comments: Chaperone declined.  EP  + hemorrhoid  Musculoskeletal:         General: Tenderness (back and neck) present. Normal range of motion.      Cervical back: Normal range of motion.      Right lower leg: No edema.      Left lower leg: No edema.   Lymphadenopathy:      Cervical: No cervical adenopathy.   Skin:     General: Skin is warm.      Findings: No rash.   Neurological:      General: No focal deficit present.      Mental Status: He is alert and oriented to person, place, and time. Mental status is at baseline.      Cranial Nerves: No cranial nerve deficit.      Sensory: No sensory deficit.      Coordination: Coordination normal.      Gait: Gait normal.      Deep Tendon Reflexes: Reflexes normal.   Psychiatric:         Mood and Affect: Mood normal.         Behavior: Behavior normal.                          Current Outpatient Medications:   •  B Complex Vitamins (vitamin b complex) capsule capsule, Take  by mouth Daily., Disp: , Rfl:   •  celecoxib (CeleBREX) 200 MG capsule, TAKE ONE CAPSULE BY MOUTH DAILY *REPLACES IBUPROFEN*, Disp: 90 capsule, Rfl: 3  •  Cholecalciferol 25 MCG (1000 UT) capsule, Take 1 capsule by mouth Daily., Disp: , Rfl:   •  folic acid (FOLVITE) 1 MG tablet, Take 2 tablets by mouth Daily., Disp: , Rfl:   •  levothyroxine (SYNTHROID, LEVOTHROID) 75 MCG tablet, Take 1 tablet by mouth Daily., Disp: 90 tablet, Rfl: 3  •  methotrexate 2.5 MG tablet, Take 8 tablets by mouth 1 (One) Time Per Week., Disp: , Rfl:   •  omeprazole (priLOSEC) 20 MG capsule, Take 1 capsule by mouth  "Daily., Disp: 90 capsule, Rfl: 3  •  sildenafil (VIAGRA) 100 MG tablet, Take 1 tablet by mouth Daily As Needed for Erectile Dysfunction., Disp: 30 tablet, Rfl: 3  •  azelastine (ASTELIN) 0.1 % nasal spray, Every 12 (Twelve) Hours., Disp: , Rfl:       The following portions of the patient's history were reviewed and updated as appropriate: allergies, current medications, past family history, past medical history, past social history, past surgical history and problem list.        Objective   /76   Pulse 50   Temp 97.5 °F (36.4 °C)   Ht 177.8 cm (70\")   Wt 85.6 kg (188 lb 12.8 oz)   SpO2 95% Comment: ra  BMI 27.09 kg/m²         Results for orders placed or performed in visit on 09/09/22   PSA DIAGNOSTIC    Specimen: Blood    Blood  Release to rudy   Result Value Ref Range    PSA 3.030 0.000 - 4.000 ng/mL   T4, Free    Specimen: Blood    Blood  Release to rudy   Result Value Ref Range    Free T4 1.27 0.93 - 1.70 ng/dL   TSH    Specimen: Blood    Blood  Release to rudy   Result Value Ref Range    TSH 1.300 0.270 - 4.200 uIU/mL   Hemoglobin A1c    Specimen: Blood    Blood  Release to rudy   Result Value Ref Range    Hemoglobin A1C 5.70 (H) 4.80 - 5.60 %       VISION-  Rt-20/40  L- 20/20  B-20/20      Assessment & Plan   Diagnoses and all orders for this visit:    Vitamin D deficiency  -     Vitamin D,25-Hydroxy; Future  -     Vitamin B12; Future    Other specified hypothyroidism  Comments:  stable, continue current regimen.  Orders:  -     levothyroxine (SYNTHROID, LEVOTHROID) 75 MCG tablet; Take 1 tablet by mouth Daily.  -     TSH Rfx On Abnormal To Free T4; Future    Gastroesophageal reflux disease with esophagitis without hemorrhage  -     omeprazole (priLOSEC) 20 MG capsule; Take 1 capsule by mouth Daily.  -     CBC & Differential; Future    Erectile dysfunction, unspecified erectile dysfunction type  -     sildenafil (VIAGRA) 100 MG tablet; Take 1 tablet by mouth Daily As Needed for Erectile " Dysfunction.    IFG (impaired fasting glucose)  -     Hemoglobin A1c; Future    Rheumatoid arthritis with positive rheumatoid factor, involving unspecified site  -     CBC & Differential; Future  -     C-reactive Protein; Future  -     Sedimentation Rate; Future    Dyslipidemia  -     Comprehensive Metabolic Panel; Future  -     Lipid Panel; Future    PSA elevation  -     PSA DIAGNOSTIC; Future    Welcome to Medicare preventive visit  -     Pneumococcal Conjugate Vaccine 20-Valent (PCV20)    Encounter for immunization  -     Pneumococcal Conjugate Vaccine 20-Valent (PCV20)    Other orders  -     azelastine (ASTELIN) 0.1 % nasal spray; Every 12 (Twelve) Hours.  -     ECG 12 Lead    Asking for a Cannabis certification letter. Advised to discuss with Pain management or Rheumatology.    1. Medicare annual wellness visit, subsequent  - Routine lab work ordered. Will advise of results upon review.    2. Rheumatoid arthritis involving multiple sites with positive rheumatoid factor (HCC)  - Continue to follow with rheumatology and pain management.  - He will discuss a medical cannabis card options with rheumatology or pain management    3. Primary osteoarthritis involving multiple joints  - Continue to follow with rheumatology.    4. Encounter for immunization  - Influenza vaccine administered today.    5. Elevated PSA  - PSA ordered. Will advise of results upon review.  - If PSA is elevated, will refer to urology.         PHQ-2/PHQ-9 Depression screening reviewed with patient . Total time spent today for depression screening  with Philippe Mixon  was 15 minutes in counseling, along with plans for any diagnositc work-up and treatment.    Advice and education were given regarding cardiovascular risk reduction, healthy dietary habits, Seatbelt and helmet use and self skin examination.          Electronically signed by:    Stephanie Livingston MD   Transcribed from ambient dictation for Stephanie Livingston MD by Citlalli  Maurice.  04/14/23   10:56 EDT    Patient or patient representative verbalized consent to the visit recording.  I have personally performed the services described in this document as transcribed by the above individual, and it is both accurate and complete.  Stephanie Livingston MD  4/16/2023  01:42 EDT           Follow Up   Return in about 6 months (around 10/14/2023) for Next scheduled follow up.  Patient was given instructions and counseling regarding his condition or for health maintenance advice. Please see specific information pulled into the AVS if appropriate.

## 2023-09-19 ENCOUNTER — HOSPITAL ENCOUNTER (OUTPATIENT)
Dept: GENERAL RADIOLOGY | Facility: HOSPITAL | Age: 63
Discharge: HOME | End: 2023-09-19
Payer: MEDICARE

## 2023-09-19 ENCOUNTER — OFFICE VISIT (OUTPATIENT)
Dept: PAIN MEDICINE | Facility: CLINIC | Age: 63
End: 2023-09-19
Payer: MEDICARE

## 2023-09-19 VITALS — WEIGHT: 184.4 LBS | BODY MASS INDEX: 26.46 KG/M2

## 2023-09-19 DIAGNOSIS — M47.812 CERVICAL SPONDYLOSIS WITHOUT MYELOPATHY: ICD-10-CM

## 2023-09-19 DIAGNOSIS — M05.9 RHEUMATOID ARTHRITIS WITH POSITIVE RHEUMATOID FACTOR, INVOLVING UNSPECIFIED SITE: ICD-10-CM

## 2023-09-19 DIAGNOSIS — M47.816 SPONDYLOSIS OF LUMBAR REGION WITHOUT MYELOPATHY OR RADICULOPATHY: ICD-10-CM

## 2023-09-19 DIAGNOSIS — M50.30 DDD (DEGENERATIVE DISC DISEASE), CERVICAL: ICD-10-CM

## 2023-09-19 DIAGNOSIS — M79.18 MYOFASCIAL PAIN: ICD-10-CM

## 2023-09-19 DIAGNOSIS — M47.816 SPONDYLOSIS OF LUMBAR REGION WITHOUT MYELOPATHY OR RADICULOPATHY: Primary | ICD-10-CM

## 2023-09-19 DIAGNOSIS — R51.9 OCCIPITAL HEADACHE: ICD-10-CM

## 2023-09-19 PROCEDURE — 73521 X-RAY EXAM HIPS BI 2 VIEWS: CPT

## 2023-09-19 PROCEDURE — 1125F AMNT PAIN NOTED PAIN PRSNT: CPT | Performed by: NURSE PRACTITIONER

## 2023-09-19 PROCEDURE — 1159F MED LIST DOCD IN RCRD: CPT | Performed by: NURSE PRACTITIONER

## 2023-09-19 PROCEDURE — 72114 X-RAY EXAM L-S SPINE BENDING: CPT

## 2023-09-19 PROCEDURE — 1160F RVW MEDS BY RX/DR IN RCRD: CPT | Performed by: NURSE PRACTITIONER

## 2023-09-19 PROCEDURE — 99214 OFFICE O/P EST MOD 30 MIN: CPT | Performed by: NURSE PRACTITIONER

## 2023-09-19 NOTE — PROGRESS NOTES
"Chief Complaint: \"Neck and lower back pain.\"       History of Present Illness:  Mr. Philippe Mixon, 63 y.o. male originally referred by Dr. Stephanie Livingston in consultation for chronic intractable neck pain and headaches. Patient reports a 1-2 year history of neck pain, which began without incident.  I last saw him in follow-up consultation in April 2022, after undergoing bilateral cervical medial branch rhizotomies performed on October 27 2021, from which at the time of his follow-up visit he reported experiencing about 60% pain relief lasting 3 to 4 weeks with recurrence of his symptoms to baseline.   At that point, he was undergoing a neurosurgical consultation with Dr. Merida, which he underwent in May 2022 and was found not to be a surgical candidate.  At the time of his last visit with me, he was not interested in further interventional pain management measures, therefore he was to follow-up as needed.  Today he tells me, he continues with significant ongoing relief of his chronic neck pain for about 5 to 6 months thereafter.  He is here today for reevaluation of his neck pain, and with a new complaint of lower back pain.      Problem #1: Neck Pain:  Pain Description: constant pain with intermittent exacerbation, described as aching, stabbing, throbbing, and sharp sensation.   Radiation of Pain: The pain radiates into the occipital region causing headaches and into the bilateral shoulders.   Pain intensity today: 5/10  Average pain intensity last week: 5/10  Pain intensity ranges from: 5/10 to 8/10  Aggravating factors: Pain increases with extension, rotation of the cervical spine, flexion   Alleviating factors: Pain decreases with oral analgesics   Associated Symptoms:   Patient denies pain, numbness, or weakness in the upper extremities.  He does report some pain extending into his arms stopping above at the elbows, from which he relates to prior rotator cuff surgeries.  Patient denies any new bladder or " bowel problems.   Patient reports difficulties with his balance but denies recent falls (related to his chronic back pain).   Patient reports bilateral cervicogenic and occipital headaches 3-4x a week lasting several hours.    Problem #1: Lower back and right lower extremity pain:  Pain History: Patient reports a longstanding history of greater than 10 years of lower back pain.  He tells me he has previously seen Dr. Rodríguez in the past, and was found not to be a surgical candidate.  He has not had any updated diagnostics in regards to his lower back pain.   Pain Description: constant pain with intermittent exacerbation, described as aching, numbing, sharp, and stabbing sensation.   Radiation of Pain: The pain radiates into the hips, and lateral aspect of his thighs and into the right calf RT>LT  Pain intensity today: 5/10  Average pain intensity last week: 5/10  Pain intensity ranges from: 5/10 to 8/10  Aggravating factors: Pain increases with driving, sitting, bending, twisting, standing, walking. Patient describes neurogenic claudication.    Alleviating factors: Pain decreases with sitting, lying down     Associated Symptoms:   Patient reports pain, numbness, and weakness in the lower extremities RT>LT.   Patient denies any new bladder or bowel problems.   Patient reports difficulties with his balance but denies recent falls.   Pain interferes with regular activities, ADLs, and affects patient's quality of life  Pain interferes with general activities (ability to walk, stand, transition from different positions), perform activities of daily living  Pain interferes with sleep causing sleep fragmentation   Stiffness      Review of previous therapies and additional medical records:  Philippe FRY Oral has already failed the following measures, including:   Conservative Measures: Oral analgesics, topical analgesics, ice, heat, chiropractic therapy, physical therapy   Interventional Measures: Some injections with   Medardo more than 8 years ago  04/28/2021: DxTx cervical facet joint injections: bilateral C4-C5, bilateral C5-C6 combined with cervicothoracic interspinous bursa injection  10/27/2021: Bilateral cervical RFA at C4-C5 and C5-C6  Surgical Measures: No history of cervical spine. No history of lumbar or hip surgery. 2011 Left Rotator cuff repair. 2010 Right Rotator cuff repair.   Philippe Mixon has not undergone orthopedic spine or neurosurgical consultation for chronic pain condition   Philippe Mixon presents with significant comorbidities including RA, hypothyroidism  In terms of current analgesics, Philippe Mixon takes: Celebrex  I have reviewed [facility] Report is consistent with medication reconciliation.  SOAPP: Low Risk    Global Pain Scale 04-01 2021 08-09 2021 09-19 2023        Pain 14 12 15        Feelings 0 0 1        Clinical outcomes 10 9 4        Activities 14 11 3        GPS Total: 38 32 23            The [place] Back Pain Disability Scale  DATE 09-19 2023                 Sleep through the night 2                 Turn over in bed 1                 Get out of bed 1                 Make your bed 1                 Put on socks (pantyhose) 0                 Ride in a car 2                 Sit in a chair for several hours 2                 Stand up for 20-30 minutes 1                 Climb one flight of stairs 2                 Walk a few blocks (200-300 yards)  2                 Walk several miles 4                 Run one block (about 50 yards) 2                 Take food out of the refrigerator 1                 Reach up to high shelves 1                 Move a chair 0                 Pull or push heavy doors 1                 Bend over to clean the bathtub 2                 Throw a ball 2                 Carry two bags of groceries 1                 Lift and carry a heavy suitcase 1                 Total score 29                     NECK PAIN DISABILITY INDEX QUESTIONNAIRE  DATE 09-19 2023             Pain intensity  0: No pain  1: Mild  2: Moderate  3: Fairly severe  4: very severe  5: Worst imaginable 3           Personal Care   0: I can look after myself normally without causing extra pain.  1: I can look after myself normally, but it causes extra pain.  2: It is painful to look after myself and I am slow and careful.  3: I need some help, but manage most of my personal care.  4: I need help every day in most aspects of self-care.  5: I do not get dressed; I wash with difficulty and stay in bed. 1            Lifting  0: I can lift heavy weights without extra pain.  1: I can lift heavy weights, but it gives extra pain.  2: Pain prevents me from lifting heavy weights off the floor but I can manage if they are conveniently positioned, for example, on a table.  3: Pain prevents me from lifting heavy weights, but I can manage light to medium weights if they are conveniently positioned.  4: I can lift very light weights.  5: I cannot lift or carry anything at all. 2            Reading  0: I can read as much as I want to with no pain in my neck.  1: I can read as much as I want to with slight pain in my neck.  2: I can read as much as I want to with moderate pain in my neck.  3: I cannot read as much as I want because of moderate pain in my neck.  4: I cannot read as much as I want because of severe pain in my neck.  5: I cannot read at all. 2            Headaches  0: I have no headaches at all.  1: I have slight headaches which come infrequently.  2: I have moderate headaches which come infrequently.  3: I have moderate headaches which come frequently.  4: I have severe headaches which come frequently.  5: I have headaches almost all the time. 3            Concentration  0: I can concentrate fully when I want to with no difficulty.  1: I can concentrate fully when I want to with slight difficulty.  2: I have a fair degree of difficulty in concentrating when I want to.  3: I have a lot of difficulty in  concentrating when I want to.  4: I have a great deal of difficulty in concentrating when I want to.  5: I cannot concentrate at all. 1            Work  0: I can do as much work as I want to.  1: I can only do my usual work, but no more.  2: I can do most of my usual work, but no more.  3: I cannot do my usual work.  4: I can hardly do any work at all.  5: I cannot do any work at all. 2            Driving  0: I can drive my car without any neck pain.  1: I can drive my car as long as I want with slight pain in my neck.  2: I can drive my car as long as I want with moderate pain in my   neck.  3: I cannot drive my car as long as I want because of moderate pain   in my neck.  4: I can hardly drive at all because of severe pain in my neck.  5: I cannot drive my car at all. 2            Sleeping  0: I have no trouble sleeping.  1: My sleep is slightly disturbed (less than 1 hour sleepless).  2: My sleep is mildly disturbed (1-2 hours sleepless).  3: My sleep is moderately disturbed (2-3 hours sleepless).  4: My sleep is greatly disturbed (3-5 hours sleepless).  5: My sleep is completely disturbed (5-7 hours) 1            Recreation  0: I am able to engage in all of my recreational activities with no neck pain at all.  1: I am able to engage in all of my recreational activities with some pain in my neck.  2: I am able to engage in most, but not all of my recreational activities because of pain in my neck.  3: I am able to engage in a few of my recreational activities because of pain in my neck.  4: I can hardly do any recreational activities because of pain in my neck.  5: I cannot do any recreational activities at all. 3            TOTAL SCORE 20             Review of Diagnostic Studies:  MRI of the cervical spine without contrast 4/9/2022: I have reviewed the imaging and the radiology report.  Vertebral body heights are well-maintained without evidence of malalignment.  Multilevel spondylosis.  C2-C3: Disc osteophyte  complex and facet arthritis.  Mild right neuroforaminal stenosis.  C3-C4: Disc osteophyte complex with bilateral facet arthritis.  Mild central spinal canal stenosis and moderate to severe bilateral neuroforaminal stenosis, greater on the right.  C4-C5: Disc osteophyte complex and facet arthritis.  Moderate central spinal canal stenosis and bilateral neuroforaminal stenosis.  C5-C6: Disc osteophyte complex with facet arthritis.  Moderate central spinal canal stenosis and moderate right and severe left neuroforaminal stenosis.  C6-C7: Disc osteophyte complex with mild to moderate narrowing of the left spinal canal.  With mild bilateral neuroforaminal stenosis.      Review of Systems   Constitutional:  Positive for activity change.   Eyes:  Positive for photophobia and pain.   Musculoskeletal:  Positive for arthralgias, back pain, joint swelling, myalgias, neck pain and neck stiffness.   Neurological:  Positive for dizziness, weakness, light-headedness and headaches.   All other systems reviewed and are negative.      Patient Active Problem List   Diagnosis    Esophageal reflux    Hyperglycemia    Hypothyroidism    Vitamin D deficiency    Duodenitis    Dyslipidemia    Family history of prostate cancer in father    Cervical spondylosis without myelopathy    DDD (degenerative disc disease), cervical    Rheumatoid arthritis with positive rheumatoid factor    Myofascial pain    Occipital headache    Insomnia due to medical condition    Cervicothoracic interspinous bursitis    Deviated nasal septum    Epigastric abdominal tenderness    Finger injury    Impacted cerumen    Retinal defect       Past Medical History:   Diagnosis Date    Abdominal tenderness, epigastric     Arthritis     Finger injury     Headache     Hypothyroidism     Low back pain Unknown    Rheumatoid arthritis          Past Surgical History:   Procedure Laterality Date    COLONOSCOPY  2/24/2023    EYE SURGERY      Repair    EYE SURGERY Left 2018     Cataract Removal    KNEE SURGERY Right 2021    right knee arthroscopy with partial medial meniscectomy [facility]    RETINAL DETACHMENT REPAIR Left 2016    ROTATOR CUFF REPAIR Left 2010    yomaira leonid    ROTATOR CUFF REPAIR Right 2011    Yomaira Leonid    SHOULDER ARTHROSCOPY           Family History   Problem Relation Age of Onset    Arthritis Other     Cancer Other     Diabetes Other     Hypertension Other     Thyroid disease Other     Arthritis Mother     Diabetes Mother     Hyperlipidemia Mother     Thyroid disease Mother     Cancer Father     Cancer Sister     Cancer Brother     Cancer Daughter          Social History     Socioeconomic History    Marital status:    Tobacco Use    Smoking status: Former     Packs/day: 1.50     Years: 15.00     Pack years: 22.50     Types: Cigarettes     Quit date: 1990     Years since quittin.9    Smokeless tobacco: Never   Vaping Use    Vaping Use: Never used   Substance and Sexual Activity    Alcohol use: No    Drug use: Never    Sexual activity: Yes     Partners: Female     Birth control/protection: Surgical           Current Outpatient Medications:     azelastine (ASTELIN) 0.1 % nasal spray, Every 12 (Twelve) Hours., Disp: , Rfl:     B Complex Vitamins (vitamin b complex) capsule capsule, Take  by mouth Daily., Disp: , Rfl:     celecoxib (CeleBREX) 200 MG capsule, TAKE ONE CAPSULE BY MOUTH DAILY *REPLACES IBUPROFEN*, Disp: 90 capsule, Rfl: 3    Cholecalciferol 25 MCG (1000 UT) capsule, Take 1 capsule by mouth Daily., Disp: , Rfl:     folic acid (FOLVITE) 1 MG tablet, Take 2 tablets by mouth Daily., Disp: , Rfl:     levothyroxine (SYNTHROID, LEVOTHROID) 75 MCG tablet, Take 1 tablet by mouth Daily., Disp: 90 tablet, Rfl: 3    methotrexate 2.5 MG tablet, Take 8 tablets by mouth 1 (One) Time Per Week., Disp: , Rfl:     omeprazole (priLOSEC) 20 MG capsule, Take 1 capsule by mouth Daily., Disp: 90 capsule, Rfl: 3    sildenafil (VIAGRA) 100 MG tablet, Take  1 tablet by mouth Daily As Needed for Erectile Dysfunction., Disp: 30 tablet, Rfl: 3      No Known Allergies      Wt 83.6 kg (184 lb 6.4 oz)   BMI 26.46 kg/m²       Physical Exam:  Constitutional: Patient appears well-developed, well-nourished, well-hydrated  HEENT: Head: Normocephalic and atraumatic  Eyes: Conjunctivae and lids are normal  Pupils: Equal, round, reactive to light  Neck: Trachea normal. Neck supple. No JVD present.   Lymphatic: No cervical adenopathy  Pulmonary: No increased work of breathing or signs of respiratory distress.   Musculoskeletal   Gait and station: Gait evaluation demonstrated a normal gait.    Cervical spine: Passive and active range of motion are somewhat limited with pain. Extension, lateral flexion, rotation of the cervical spine increased and reproduced pain. Cervical facet joint loading maneuvers are positive.  There is no pain upon palpation of the cervicothoracic interspinous bursa today.  Muscles: Presence of active trigger points at the levator scapulae   Shoulders: The range of motion of the glenohumeral joints is slightly limited bilaterally. Rotator cuff strength is 5/5.   Lumbar spine: Passive and active range of motion are limited secondary to pain. Extension, lateral flexion, rotation of the lumbar spine increased and reproduced pain. Lumbar facet joint loading maneuvers are positive.  Kris test and Gaenslen's test are negative.   Piriformis maneuvers are negative.    Palpation of the bilateral ischial tuberosities, unrevealing.    Palpation of the bilateral greater trochanter, unrevealing.    Examination of the Iliotibial band: unrevealing.    Hip joints: The range of motion of the hip joints is somewhat limited to flexion and internal rotation on the bilaterally but without significant pain.  Neurological:   Patient is alert and oriented to person, place, and time.   Speech: Normal.   Cortical function: Normal mental status.   Cranial nerves 2-12: intact.   Reflex  Scores:  Right brachioradialis: 2+  Left brachioradialis: 2+  Right biceps: 2+  Left biceps: 2+  Right triceps: 2+  Left triceps: 2+  Right patellar: 1+  Left patellar: 1+  Right Achilles: 1+  Left Achilles: 1+  Motor strength: 5/5  Motor Tone: Normal .   Involuntary movements: None.   Superficial/Primitive Reflexes: Primitive reflexes were absent.   Right Bai: Absent  Left Bai: Absent  Right ankle clonus: Absent  Left ankle clonus: Absent   Babinsky: Absent  Spurling sign: negative. Neck tornado test: Negative. Lhermitte sign: Negative. Long tract signs: Negative. Straight leg raising test: Positive on the right. Femoral stretch sign: Negative.   Sensory exam: Intact to light touch, intact pain and temperature sensation, intact vibration sensation and normal proprioception.   Coordination: Normal finger to nose and heel to shin. Normal balance and negative Romberg's sign   Skin and subcutaneous tissue: Skin is warm and intact. No rash noted. No cyanosis.   Psychiatric: Judgment and insight: Normal. Orientation to person, place and time: Normal. Recent and remote memory: Intact. Mood and affect: Normal.              ASSESSMENT:   1. Cervical spondylosis without myelopathy    2. Spondylosis of lumbar region without myelopathy or radiculopathy    3. DDD (degenerative disc disease), cervical    4. Occipital headache    5. Rheumatoid arthritis with positive rheumatoid factor, involving unspecified site    6. Myofascial pain          PLAN/MEDICAL DECISION MAKING:  Mr. Philippe Mixon, 63 y.o. male reports a several year history of neck pain.  MRI of the cervical spine without contrast on 4/9/2022 revealed diffuse cervical spondylosis and multilevel degenerative disc disease, and areas of central spinal canal stenosis as well as bilateral neuroforaminal stenosis. Patient has been diagnosed with rheumatoid arthritis and is treated by Dr. Vega.  He continues to have elements of mechanical/axial neck pain,  with occipital headaches.  He denies any obvious radicular complaints.  He also has a new complaint of lower back pain, which he presents with no diagnostics.  Patient has failed to obtain pain relief with conservative measures including oral analgesics and physical therapy. I have reviewed all available patient's medical records as well as previous therapies as referenced above. I had a lengthy conversation with . Philippe Mixon regarding his chronic pain condition and potential therapeutic options including risks, benefits, alternative therapies, to name a few. Therefore, I have proposed the following plan:  1. Pharmacological measures: Reviewed and discussed;   A. Patient takes Celebrex.  Patient has declined additional phonological measures.  2. Diagnostics:  A. Lumbar spine x-rays with flexion-extension views and bilateral hip x-rays  B. MRI of the lumbar spine without contrast  C. EMG/NCV of the bilateral lower extremities  2. Interventional pain management measures:   A. Treatment of Neck Pain: Patient will be scheduled for one set of diagnostic bilateral cervical medial branch blocks at C4, C5, C6; for bilateral cervical facet joints at C4-C5, C5-C6. If patient experiences more than 80% pain relief along with significant improvement in the range of motion of the cervical spine, then, patient will be scheduled for bilateral cervical medial branch rhizotomies. Otherwise we may consider cervical epidural steroid injection by interlaminar approach.  Otherwise, we may consider diagnostic and therapeutic bilateral greater occipital nerve blocks by hydrodissection technique under ultrasound and fluoroscopic guidance if his headaches continue to be bothersome.  B. Treatment of Lower back pain: Depending on diagnostic studies, we may consider transforaminal epidural injections versus lumbar MBB's and rhizotomy.  3. Long-term rehabilitation efforts:  A. The patient does not have a history of falls. I did  complete a risk assessment for falls  B. Patient will start a comprehensive physical therapy program for upper body strengthening/posture correction, core strengthening, gait and balance training, ultrasound, ASTYM, myofascial release, cupping and dry needling   C. Start an exercise program such as yoga, water therapy and swimming  D. Contrast therapy: Apply ice-packs for 15-20 minutes, followed by heating pads for 15-20 minutes to affected area   4. The patient has been instructed to contact my office with any questions or difficulties. The patient understands the plan and agrees to proceed accordingly.    Philippe Mixon reports a pain score of 5.  Given his pain assessment as noted, treatment options were discussed and the following options were decided upon as a follow-up plan to address the patient's pain: continuation of current treatment plan for pain, home exercises and therapy, patient declined analgesics, patient not interested in pharmacological measures, referral to Physical Therapy, and use of non-medical modalities (ice, heat, stretching and/or behavior modifications).      Pain Medications               celecoxib (CeleBREX) 200 MG capsule TAKE ONE CAPSULE BY MOUTH DAILY *REPLACES IBUPROFEN*              Patient Care Team:  Stephanie Livingston MD as PCP - General  Michelle Stahl APRN as Nurse Practitioner (Nurse Practitioner)     No orders of the defined types were placed in this encounter.        Future Appointments   Date Time Provider Department Center   10/9/2023  8:30 AM Stephanie Livingston MD [facility]   4/17/2024  7:30 AM Stephanie Livingston MD [facility]         MORGAN Rosales

## 2023-09-27 DIAGNOSIS — M47.816 SPONDYLOSIS OF LUMBAR REGION WITHOUT MYELOPATHY OR RADICULOPATHY: Primary | ICD-10-CM

## 2023-10-04 DIAGNOSIS — M47.812 CERVICAL SPONDYLOSIS WITHOUT MYELOPATHY: Primary | ICD-10-CM

## 2023-10-09 ENCOUNTER — LAB (OUTPATIENT)
Dept: LAB | Facility: HOSPITAL | Age: 63
End: 2023-10-09
Payer: MEDICARE

## 2023-10-09 ENCOUNTER — OFFICE VISIT (OUTPATIENT)
Dept: INTERNAL MEDICINE | Facility: CLINIC | Age: 63
End: 2023-10-09
Payer: MEDICARE

## 2023-10-09 VITALS
HEART RATE: 73 BPM | TEMPERATURE: 98.1 F | OXYGEN SATURATION: 99 % | HEIGHT: 70 IN | WEIGHT: 184 LBS | DIASTOLIC BLOOD PRESSURE: 76 MMHG | BODY MASS INDEX: 26.34 KG/M2 | SYSTOLIC BLOOD PRESSURE: 130 MMHG

## 2023-10-09 DIAGNOSIS — E03.8 OTHER SPECIFIED HYPOTHYROIDISM: ICD-10-CM

## 2023-10-09 DIAGNOSIS — E78.5 DYSLIPIDEMIA: ICD-10-CM

## 2023-10-09 DIAGNOSIS — E03.8 OTHER SPECIFIED HYPOTHYROIDISM: Primary | ICD-10-CM

## 2023-10-09 DIAGNOSIS — M54.42 CHRONIC LEFT-SIDED LOW BACK PAIN WITH LEFT-SIDED SCIATICA: ICD-10-CM

## 2023-10-09 DIAGNOSIS — G89.29 CHRONIC LEFT-SIDED LOW BACK PAIN WITH LEFT-SIDED SCIATICA: ICD-10-CM

## 2023-10-09 LAB
ALBUMIN SERPL-MCNC: 4.9 G/DL (ref 3.5–5.2)
ALBUMIN/GLOB SERPL: 2 G/DL
ALP SERPL-CCNC: 68 U/L (ref 39–117)
ALT SERPL W P-5'-P-CCNC: 20 U/L (ref 1–41)
ANION GAP SERPL CALCULATED.3IONS-SCNC: 9.1 MMOL/L (ref 5–15)
AST SERPL-CCNC: 25 U/L (ref 1–40)
BILIRUB SERPL-MCNC: 0.4 MG/DL (ref 0–1.2)
BUN SERPL-MCNC: 19 MG/DL (ref 8–23)
BUN/CREAT SERPL: 19.6 (ref 7–25)
CALCIUM SPEC-SCNC: 9.9 MG/DL (ref 8.6–10.5)
CHLORIDE SERPL-SCNC: 105 MMOL/L (ref 98–107)
CO2 SERPL-SCNC: 27.9 MMOL/L (ref 22–29)
CREAT SERPL-MCNC: 0.97 MG/DL (ref 0.76–1.27)
EGFRCR SERPLBLD CKD-EPI 2021: 88.3 ML/MIN/1.73
GLOBULIN UR ELPH-MCNC: 2.4 GM/DL
GLUCOSE SERPL-MCNC: 94 MG/DL (ref 65–99)
POTASSIUM SERPL-SCNC: 4.3 MMOL/L (ref 3.5–5.2)
PROT SERPL-MCNC: 7.3 G/DL (ref 6–8.5)
SODIUM SERPL-SCNC: 142 MMOL/L (ref 136–145)
TSH SERPL DL<=0.05 MIU/L-ACNC: 3.23 UIU/ML (ref 0.27–4.2)

## 2023-10-09 PROCEDURE — 84443 ASSAY THYROID STIM HORMONE: CPT

## 2023-10-09 PROCEDURE — 80053 COMPREHEN METABOLIC PANEL: CPT

## 2023-10-09 RX ORDER — KETOROLAC TROMETHAMINE 30 MG/ML
60 INJECTION, SOLUTION INTRAMUSCULAR; INTRAVENOUS ONCE
Status: COMPLETED | OUTPATIENT
Start: 2023-10-09 | End: 2023-10-09

## 2023-10-09 RX ORDER — PREDNISONE 10 MG/1
TABLET ORAL
Qty: 18 TABLET | Refills: 0 | Status: SHIPPED | OUTPATIENT
Start: 2023-10-09

## 2023-10-09 RX ADMIN — KETOROLAC TROMETHAMINE 60 MG: 30 INJECTION, SOLUTION INTRAMUSCULAR; INTRAVENOUS at 09:16

## 2023-10-09 NOTE — PROGRESS NOTES
Hypothyroidism and Leg Pain (Left, hurts to walk.  Radiates from back.  Has been seeing PTC.)    Subjective   Philippe Mixon is a 63 y.o. male is here today for follow-up.    History of Present Illness  Seen by Pain management, who is evaluating his back. Notes pain is severe, MRI is scheduled for November, does not feel like it can last until then.Pain is 8/10 now, but last night was at 10. Takes a msk relaxant.      Current Outpatient Medications:     azelastine (ASTELIN) 0.1 % nasal spray, Every 12 (Twelve) Hours., Disp: , Rfl:     B Complex Vitamins (vitamin b complex) capsule capsule, Take  by mouth Daily., Disp: , Rfl:     celecoxib (CeleBREX) 200 MG capsule, TAKE ONE CAPSULE BY MOUTH DAILY *REPLACES IBUPROFEN*, Disp: 90 capsule, Rfl: 3    Cholecalciferol 25 MCG (1000 UT) capsule, Take 1 capsule by mouth Daily., Disp: , Rfl:     folic acid (FOLVITE) 1 MG tablet, Take 2 tablets by mouth Daily., Disp: , Rfl:     levothyroxine (SYNTHROID, LEVOTHROID) 75 MCG tablet, Take 1 tablet by mouth Daily., Disp: 90 tablet, Rfl: 3    methotrexate 2.5 MG tablet, Take 8 tablets by mouth 1 (One) Time Per Week., Disp: , Rfl:     omeprazole (priLOSEC) 20 MG capsule, Take 1 capsule by mouth Daily., Disp: 90 capsule, Rfl: 3    sildenafil (VIAGRA) 100 MG tablet, Take 1 tablet by mouth Daily As Needed for Erectile Dysfunction., Disp: 30 tablet, Rfl: 3    predniSONE (DELTASONE) 10 MG tablet, Take 3 tabs daily x 3 days then 2 tabs daily x 3 days then 1 tab daily x 3 days then stop., Disp: 18 tablet, Rfl: 0      The following portions of the patient's history were reviewed and updated as appropriate: allergies, current medications, past family history, past medical history, past social history, past surgical history and problem list.    Review of Systems   Constitutional:  Negative for chills and fatigue.   HENT:  Negative for congestion, ear pain and sore throat.    Eyes:  Negative for pain, redness and visual disturbance.  "  Respiratory:  Negative for cough and shortness of breath.    Cardiovascular:  Negative for chest pain, palpitations and leg swelling.   Gastrointestinal:  Negative for abdominal pain, diarrhea and nausea.   Endocrine: Negative for cold intolerance and heat intolerance.   Genitourinary:  Negative for flank pain and urgency.   Musculoskeletal:  Positive for back pain and myalgias. Negative for arthralgias and gait problem.   Skin:  Negative for pallor and rash.   Neurological:  Negative for dizziness, weakness and headaches.   Psychiatric/Behavioral:  Negative for dysphoric mood and sleep disturbance. The patient is not nervous/anxious.        Objective   /76   Pulse 73   Temp 98.1 øF (36.7 øC)   Ht 177.8 cm (70\")   Wt 83.5 kg (184 lb)   SpO2 99% Comment: ra  BMI 26.40 kg/mý   Physical Exam  Vitals and nursing note reviewed.   Constitutional:       Appearance: He is well-developed.   HENT:      Head: Normocephalic and atraumatic.      Right Ear: External ear normal.      Left Ear: External ear normal.      Mouth/Throat:      Pharynx: No oropharyngeal exudate.   Eyes:      Conjunctiva/sclera: Conjunctivae normal.      Pupils: Pupils are equal, round, and reactive to light.   Neck:      Thyroid: No thyromegaly.   Cardiovascular:      Rate and Rhythm: Normal rate and regular rhythm.      Pulses: Normal pulses.      Heart sounds: Normal heart sounds. No murmur heard.     No friction rub. No gallop.   Pulmonary:      Effort: Pulmonary effort is normal.      Breath sounds: Normal breath sounds.   Abdominal:      General: Bowel sounds are normal. There is no distension.      Palpations: Abdomen is soft.      Tenderness: There is no abdominal tenderness.   Musculoskeletal:         General: Tenderness present. No signs of injury.      Cervical back: Neck supple.   Skin:     General: Skin is warm and dry.   Neurological:      Mental Status: He is alert and oriented to person, place, and time.      Cranial Nerves: " No cranial nerve deficit.   Psychiatric:         Judgment: Judgment normal.           Results for orders placed or performed in visit on 04/14/23   Sedimentation Rate    Specimen: Blood    Blood  Release to rudy   Result Value Ref Range    Sed Rate 2 0 - 20 mm/hr   C-reactive Protein    Specimen: Blood    Blood  Release to rudy   Result Value Ref Range    C-Reactive Protein <0.30 0.00 - 0.50 mg/dL   PSA DIAGNOSTIC    Specimen: Blood    Blood  Release to rudy   Result Value Ref Range    PSA 3.130 0.000 - 4.000 ng/mL   Hemoglobin A1c    Specimen: Blood    Blood  Release to rudy   Result Value Ref Range    Hemoglobin A1C 5.20 4.80 - 5.60 %   Vitamin B12    Specimen: Blood    Blood  Release to rudy   Result Value Ref Range    Vitamin B-12 769 211 - 946 pg/mL   Vitamin D,25-Hydroxy    Specimen: Blood    Blood  Release to rudy   Result Value Ref Range    25 Hydroxy, Vitamin D 47.4 30.0 - 100.0 ng/ml   TSH Rfx On Abnormal To Free T4    Specimen: Blood    Blood  Release to rudy   Result Value Ref Range    TSH 2.320 0.270 - 4.200 uIU/mL   Lipid Panel    Specimen: Blood    Blood  Release to rudy   Result Value Ref Range    Total Cholesterol 169 0 - 200 mg/dL    Triglycerides 74 0 - 150 mg/dL    HDL Cholesterol 74 (H) 40 - 60 mg/dL    VLDL Cholesterol Pipo 14 5 - 40 mg/dL    LDL Chol Calc (NIH) 81 0 - 100 mg/dL   Comprehensive Metabolic Panel    Specimen: Blood    Blood  Release to rudy   Result Value Ref Range    Glucose 86 65 - 99 mg/dL    BUN 22 8 - 23 mg/dL    Creatinine 1.11 0.76 - 1.27 mg/dL    EGFR Result 75.5 >60.0 mL/min/1.73    BUN/Creatinine Ratio 19.8 7.0 - 25.0    Sodium 140 136 - 145 mmol/L    Potassium 4.5 3.5 - 5.2 mmol/L    Chloride 104 98 - 107 mmol/L    Total CO2 28.1 22.0 - 29.0 mmol/L    Calcium 9.8 8.6 - 10.5 mg/dL    Total Protein 6.9 6.0 - 8.5 g/dL    Albumin 4.8 3.5 - 5.2 g/dL    Globulin 2.1 gm/dL    A/G Ratio 2.3 g/dL    Total Bilirubin 0.5 0.0 - 1.2 mg/dL    Alkaline Phosphatase 66 39 - 117 U/L     AST (SGOT) 22 1 - 40 U/L    ALT (SGPT) 27 1 - 41 U/L   CBC & Differential    Specimen: Blood    Blood  Release to rudy   Result Value Ref Range    WBC 5.40 3.40 - 10.80 10*3/mm3    RBC 4.67 4.14 - 5.80 10*6/mm3    Hemoglobin 14.6 13.0 - 17.7 g/dL    Hematocrit 44.1 37.5 - 51.0 %    MCV 94.4 79.0 - 97.0 fL    MCH 31.3 26.6 - 33.0 pg    MCHC 33.1 31.5 - 35.7 g/dL    RDW 13.5 12.3 - 15.4 %    Platelets 253 140 - 450 10*3/mm3    Neutrophil Rel % 62.6 42.7 - 76.0 %    Lymphocyte Rel % 25.2 19.6 - 45.3 %    Monocyte Rel % 9.4 5.0 - 12.0 %    Eosinophil Rel % 1.7 0.3 - 6.2 %    Basophil Rel % 0.9 0.0 - 1.5 %    Neutrophils Absolute 3.38 1.70 - 7.00 10*3/mm3    Lymphocytes Absolute 1.36 0.70 - 3.10 10*3/mm3    Monocytes Absolute 0.51 0.10 - 0.90 10*3/mm3    Eosinophils Absolute 0.09 0.00 - 0.40 10*3/mm3    Basophils Absolute 0.05 0.00 - 0.20 10*3/mm3    Immature Granulocyte Rel % 0.2 0.0 - 0.5 %    Immature Grans Absolute 0.01 0.00 - 0.05 10*3/mm3    nRBC 0.0 0.0 - 0.2 /100 WBC             Assessment & Plan   Diagnoses and all orders for this visit:    Other specified hypothyroidism  -     TSH Rfx On Abnormal To Free T4; Future    Dyslipidemia  -     Comprehensive Metabolic Panel; Future    Chronic left-sided low back pain with left-sided sciatica  -     ketorolac (TORADOL) injection 60 mg  -     predniSONE (DELTASONE) 10 MG tablet; Take 3 tabs daily x 3 days then 2 tabs daily x 3 days then 1 tab daily x 3 days then stop.             Return for Next scheduled follow up, Medicare Wellness.    Electronically signed by:    Stephanie Livingston MD

## 2023-10-16 ENCOUNTER — OUTSIDE FACILITY SERVICE (OUTPATIENT)
Dept: PAIN MEDICINE | Facility: CLINIC | Age: 63
End: 2023-10-16
Payer: MEDICARE

## 2023-10-16 PROCEDURE — 99152 MOD SED SAME PHYS/QHP 5/>YRS: CPT | Performed by: ANESTHESIOLOGY

## 2023-10-16 PROCEDURE — 64633 DESTROY CERV/THOR FACET JNT: CPT | Performed by: ANESTHESIOLOGY

## 2023-10-16 PROCEDURE — 64634 DESTROY C/TH FACET JNT ADDL: CPT | Performed by: ANESTHESIOLOGY

## 2023-11-01 ENCOUNTER — HOSPITAL ENCOUNTER (OUTPATIENT)
Dept: MRI IMAGING | Facility: HOSPITAL | Age: 63
Discharge: HOME | End: 2023-11-01
Admitting: NURSE PRACTITIONER
Payer: MEDICARE

## 2023-11-01 DIAGNOSIS — M47.816 SPONDYLOSIS OF LUMBAR REGION WITHOUT MYELOPATHY OR RADICULOPATHY: ICD-10-CM

## 2023-11-01 PROCEDURE — 72148 MRI LUMBAR SPINE W/O DYE: CPT

## 2023-11-02 ENCOUNTER — TELEPHONE (OUTPATIENT)
Dept: PAIN MEDICINE | Facility: CLINIC | Age: 63
End: 2023-11-02
Payer: MEDICARE

## 2023-11-02 NOTE — TELEPHONE ENCOUNTER
Caller: Philippe Mixon    Relationship: Self    Best call back number:     Caller requesting test results: PATIENT    What test was performed: MRI LUMBAR SPINE    When was the test performed: 11/1/23    Where was the test performed: [facility]

## 2023-11-08 DIAGNOSIS — M51.16 INTERVERTEBRAL DISC DISORDER WITH RADICULOPATHY OF LUMBAR REGION: Primary | ICD-10-CM

## 2023-11-20 DIAGNOSIS — M51.16 INTERVERTEBRAL DISC DISORDER WITH RADICULOPATHY OF LUMBAR REGION: Primary | ICD-10-CM

## 2024-04-17 ENCOUNTER — OFFICE VISIT (OUTPATIENT)
Dept: INTERNAL MEDICINE | Facility: CLINIC | Age: 64
End: 2024-04-17
Payer: MEDICARE

## 2024-04-17 ENCOUNTER — LAB (OUTPATIENT)
Dept: LAB | Facility: HOSPITAL | Age: 64
End: 2024-04-17
Payer: MEDICARE

## 2024-04-17 VITALS
OXYGEN SATURATION: 94 % | BODY MASS INDEX: 26.05 KG/M2 | WEIGHT: 182 LBS | HEART RATE: 52 BPM | DIASTOLIC BLOOD PRESSURE: 74 MMHG | HEIGHT: 70 IN | SYSTOLIC BLOOD PRESSURE: 110 MMHG | TEMPERATURE: 98.4 F

## 2024-04-17 DIAGNOSIS — E78.5 DYSLIPIDEMIA: ICD-10-CM

## 2024-04-17 DIAGNOSIS — M05.9 RHEUMATOID ARTHRITIS WITH POSITIVE RHEUMATOID FACTOR, INVOLVING UNSPECIFIED SITE: ICD-10-CM

## 2024-04-17 DIAGNOSIS — Z00.00 MEDICARE ANNUAL WELLNESS VISIT, SUBSEQUENT: Primary | ICD-10-CM

## 2024-04-17 DIAGNOSIS — N40.1 BENIGN PROSTATIC HYPERPLASIA WITH LOWER URINARY TRACT SYMPTOMS, SYMPTOM DETAILS UNSPECIFIED: ICD-10-CM

## 2024-04-17 DIAGNOSIS — K21.00 GASTROESOPHAGEAL REFLUX DISEASE WITH ESOPHAGITIS WITHOUT HEMORRHAGE: ICD-10-CM

## 2024-04-17 DIAGNOSIS — E55.9 VITAMIN D DEFICIENCY: ICD-10-CM

## 2024-04-17 DIAGNOSIS — N52.9 ERECTILE DYSFUNCTION, UNSPECIFIED ERECTILE DYSFUNCTION TYPE: ICD-10-CM

## 2024-04-17 DIAGNOSIS — E03.8 OTHER SPECIFIED HYPOTHYROIDISM: ICD-10-CM

## 2024-04-17 DIAGNOSIS — G89.29 CHRONIC RIGHT SHOULDER PAIN: ICD-10-CM

## 2024-04-17 DIAGNOSIS — R73.9 HYPERGLYCEMIA: ICD-10-CM

## 2024-04-17 DIAGNOSIS — M25.511 CHRONIC RIGHT SHOULDER PAIN: ICD-10-CM

## 2024-04-17 LAB
25(OH)D3+25(OH)D2 SERPL-MCNC: 39.6 NG/ML (ref 30–100)
ALBUMIN SERPL-MCNC: 4.5 G/DL (ref 3.5–5.2)
ALBUMIN/GLOB SERPL: 2 G/DL
ALP SERPL-CCNC: 79 U/L (ref 39–117)
ALT SERPL-CCNC: 24 U/L (ref 1–41)
AST SERPL-CCNC: 24 U/L (ref 1–40)
BILIRUB SERPL-MCNC: 0.5 MG/DL (ref 0–1.2)
BUN SERPL-MCNC: 27 MG/DL (ref 8–23)
BUN/CREAT SERPL: 26 (ref 7–25)
CALCIUM SERPL-MCNC: 9.9 MG/DL (ref 8.6–10.5)
CHLORIDE SERPL-SCNC: 105 MMOL/L (ref 98–107)
CHOLEST SERPL-MCNC: 149 MG/DL (ref 0–200)
CO2 SERPL-SCNC: 28.4 MMOL/L (ref 22–29)
CREAT SERPL-MCNC: 1.04 MG/DL (ref 0.76–1.27)
EGFRCR SERPLBLD CKD-EPI 2021: 80.7 ML/MIN/1.73
ERYTHROCYTE [DISTWIDTH] IN BLOOD BY AUTOMATED COUNT: 13.4 % (ref 12.3–15.4)
GLOBULIN SER CALC-MCNC: 2.2 GM/DL
GLUCOSE SERPL-MCNC: 96 MG/DL (ref 65–99)
HBA1C MFR BLD: 5.5 % (ref 4.8–5.6)
HCT VFR BLD AUTO: 46.7 % (ref 37.5–51)
HDLC SERPL-MCNC: 72 MG/DL (ref 40–60)
HGB BLD-MCNC: 15.8 G/DL (ref 13–17.7)
LDLC SERPL CALC-MCNC: 61 MG/DL (ref 0–100)
MCH RBC QN AUTO: 31 PG (ref 26.6–33)
MCHC RBC AUTO-ENTMCNC: 33.8 G/DL (ref 31.5–35.7)
MCV RBC AUTO: 91.7 FL (ref 79–97)
PLATELET # BLD AUTO: 273 10*3/MM3 (ref 140–450)
POTASSIUM SERPL-SCNC: 4.6 MMOL/L (ref 3.5–5.2)
PROT SERPL-MCNC: 6.7 G/DL (ref 6–8.5)
PSA SERPL-MCNC: 3.8 NG/ML (ref 0–4)
RBC # BLD AUTO: 5.09 10*6/MM3 (ref 4.14–5.8)
SODIUM SERPL-SCNC: 142 MMOL/L (ref 136–145)
TRIGL SERPL-MCNC: 88 MG/DL (ref 0–150)
TSH SERPL DL<=0.005 MIU/L-ACNC: 3.48 UIU/ML (ref 0.27–4.2)
VIT B12 SERPL-MCNC: 752 PG/ML (ref 211–946)
VLDLC SERPL CALC-MCNC: 16 MG/DL (ref 5–40)
WBC # BLD AUTO: 7.2 10*3/MM3 (ref 3.4–10.8)

## 2024-04-17 RX ORDER — SILDENAFIL 100 MG/1
100 TABLET, FILM COATED ORAL DAILY PRN
Qty: 30 TABLET | Refills: 3 | Status: SHIPPED | OUTPATIENT
Start: 2024-04-17

## 2024-04-17 RX ORDER — CELECOXIB 400 MG/1
400 CAPSULE ORAL DAILY
COMMUNITY
Start: 2024-01-10

## 2024-04-17 RX ORDER — LEVOTHYROXINE SODIUM 0.07 MG/1
75 TABLET ORAL DAILY
Qty: 90 TABLET | Refills: 3 | Status: SHIPPED | OUTPATIENT
Start: 2024-04-17

## 2024-04-17 RX ORDER — OMEPRAZOLE 20 MG/1
20 CAPSULE, DELAYED RELEASE ORAL DAILY
Qty: 90 CAPSULE | Refills: 3 | Status: SHIPPED | OUTPATIENT
Start: 2024-04-17

## 2024-04-17 NOTE — PROGRESS NOTES
The ABCs of the Annual Wellness Visit  Subsequent Medicare Wellness Visit    Subjective    Philippe Mixon is a 64 y.o. male who presents for a Subsequent Medicare Wellness Visit.    The following portions of the patient's history were reviewed and   updated as appropriate: allergies, current medications, past family history, past medical history, past social history, past surgical history, and problem list.    Compared to one year ago, the patient feels his physical   health is the same.    Compared to one year ago, the patient feels his mental   health is the same.    Recent Hospitalizations:  He was not admitted to the hospital during the last year.       Current Medical Providers:  Patient Care Team:  Stephanie Livingston MD as PCP - General  Michelle Stahl APRN as Nurse Practitioner (Nurse Practitioner)    Outpatient Medications Prior to Visit   Medication Sig Dispense Refill    azelastine (ASTELIN) 0.1 % nasal spray Every 12 (Twelve) Hours.      B Complex Vitamins (vitamin b complex) capsule capsule Take  by mouth Daily.      celecoxib (CeleBREX) 400 MG capsule Take 1 capsule by mouth Daily.      Cholecalciferol 25 MCG (1000 UT) capsule Take 1 capsule by mouth Daily.      folic acid (FOLVITE) 1 MG tablet Take 2 tablets by mouth Daily.      methotrexate 2.5 MG tablet Take 8 tablets by mouth 1 (One) Time Per Week.      celecoxib (CeleBREX) 200 MG capsule TAKE ONE CAPSULE BY MOUTH DAILY *REPLACES IBUPROFEN* 90 capsule 3    levothyroxine (SYNTHROID, LEVOTHROID) 75 MCG tablet Take 1 tablet by mouth Daily. 90 tablet 3    omeprazole (priLOSEC) 20 MG capsule Take 1 capsule by mouth Daily. 90 capsule 3    sildenafil (VIAGRA) 100 MG tablet Take 1 tablet by mouth Daily As Needed for Erectile Dysfunction. 30 tablet 3    predniSONE (DELTASONE) 10 MG tablet Take 3 tabs daily x 3 days then 2 tabs daily x 3 days then 1 tab daily x 3 days then stop. 18 tablet 0     No facility-administered medications prior to visit.  "      No opioid medication identified on active medication list. I have reviewed chart for other potential  high risk medication/s and harmful drug interactions in the elderly.        Aspirin is not on active medication list.  Aspirin use is not indicated based on review of current medical condition/s. Risk of harm outweighs potential benefits.  .    Patient Active Problem List   Diagnosis    Esophageal reflux    Hyperglycemia    Hypothyroidism    Vitamin D deficiency    Duodenitis    Dyslipidemia    Family history of prostate cancer in father    Cervical spondylosis without myelopathy    DDD (degenerative disc disease), cervical    Rheumatoid arthritis with positive rheumatoid factor    Myofascial pain    Occipital headache    Insomnia due to medical condition    Cervicothoracic interspinous bursitis    Deviated nasal septum    Epigastric abdominal tenderness    Finger injury    Impacted cerumen    Retinal defect     Advance Care Planning   Advance Care Planning     Advance Directive is not on file.  ACP discussion was held with the patient during this visit. Patient has an advance directive (not in EMR), copy requested.     Objective    Vitals:    24 0734   BP: 110/74   Pulse: 52   Temp: 98.4 °F (36.9 °C)   SpO2: 94%  Comment: ra   Weight: 82.6 kg (182 lb)   Height: 177.8 cm (70\")   PainSc:   7   PainLoc: Generalized     Estimated body mass index is 26.11 kg/m² as calculated from the following:    Height as of this encounter: 177.8 cm (70\").    Weight as of this encounter: 82.6 kg (182 lb).           Does the patient have evidence of cognitive impairment? No          HEALTH RISK ASSESSMENT    Smoking Status:  Social History     Tobacco Use   Smoking Status Former    Current packs/day: 0.00    Average packs/day: 1.5 packs/day for 15.0 years (22.5 ttl pk-yrs)    Types: Cigarettes    Start date: 1975    Quit date: 1990    Years since quittin.4   Smokeless Tobacco Never     Alcohol " Consumption:  Social History     Substance and Sexual Activity   Alcohol Use No     Fall Risk Screen:    KASEYADI Fall Risk Assessment was completed, and patient is at LOW risk for falls.Assessment completed on:2024    Depression Screenin/17/2024     7:40 AM   PHQ-2/PHQ-9 Depression Screening   Little Interest or Pleasure in Doing Things 0-->not at all   Feeling Down, Depressed or Hopeless 0-->not at all   PHQ-9: Brief Depression Severity Measure Score 0       Health Habits and Functional and Cognitive Screenin/12/2024     8:49 AM   Functional & Cognitive Status   Do you have difficulty preparing food and eating? No   Do you have difficulty bathing yourself, getting dressed or grooming yourself? No   Do you have difficulty using the toilet? No   Do you have difficulty moving around from place to place? No   Do you have trouble with steps or getting out of a bed or a chair? No   Current Diet Well Balanced Diet   Dental Exam Up to date   Eye Exam Up to date   Exercise (times per week) 3 times per week   Current Exercises Include Hiking;Light Weights;Walking;Yard Work   Do you need help using the phone?  No   Are you deaf or do you have serious difficulty hearing?  No   Do you need help to go to places out of walking distance? No   Do you need help shopping? No   Do you need help preparing meals?  No   Do you need help with housework?  No   Do you need help with laundry? No   Do you need help taking your medications? No   Do you need help managing money? No   Do you ever drive or ride in a car without wearing a seat belt? No   Have you felt unusual stress, anger or loneliness in the last month? No   Who do you live with? Spouse   If you need help, do you have trouble finding someone available to you? No   Have you been bothered in the last four weeks by sexual problems? No   Do you have difficulty concentrating, remembering or making decisions? Yes       Age-appropriate Screening Schedule:  Refer  to the list below for future screening recommendations based on patient's age, sex and/or medical conditions. Orders for these recommended tests are listed in the plan section. The patient has been provided with a written plan.    Health Maintenance   Topic Date Due    ZOSTER VACCINE (1 of 2) 04/17/2024 (Originally 4/16/2011)    COVID-19 Vaccine (4 - 2023-24 season) 04/19/2024 (Originally 9/1/2023)    RSV Vaccine - Adults (1 - 1-dose 60+ series) 04/17/2025 (Originally 4/16/2021)    INFLUENZA VACCINE  08/01/2024    BMI FOLLOWUP  11/20/2024    ANNUAL WELLNESS VISIT  04/17/2025    TDAP/TD VACCINES (3 - Td or Tdap) 09/14/2025    COLORECTAL CANCER SCREENING  02/27/2028    HEPATITIS C SCREENING  Completed    Pneumococcal Vaccine 0-64  Aged Out                  CMS Preventative Services Quick Reference  Risk Factors Identified During Encounter  Chronic Pain: Pain Management Referral Ordered  Immunizations Discussed/Encouraged: Shingrix and RSV (Respiratory Syncytial Virus)  The above risks/problems have been discussed with the patient.  Pertinent information has been shared with the patient in the After Visit Summary.  An After Visit Summary and PPPS were made available to the patient.    Follow Up:   Next Medicare Wellness visit to be scheduled in 1 year.       Additional E&M Note during same encounter follows:  Patient has multiple medical problems which are significant and separately identifiable that require additional work above and beyond the Medicare Wellness Visit.      Chief Complaint  Medicare Wellness-Initial Visit    Subjective        HPI  Philippe Mixon is also being seen today for for a follow up on his arthritis, hypothyroid and gerd.  Continues to hurt all over. On MTX, not on any Monoclonal antibody agents.    Review of Systems   Constitutional:  Negative for chills and fever.   HENT:  Negative for congestion, ear pain and sore throat.    Eyes:  Negative for pain, redness and visual disturbance.  "  Respiratory:  Negative for cough and shortness of breath.    Cardiovascular:  Negative for chest pain, palpitations and leg swelling.   Gastrointestinal:  Negative for abdominal pain, diarrhea and nausea.   Endocrine: Negative for cold intolerance and heat intolerance.   Genitourinary:  Negative for flank pain and urgency.   Musculoskeletal:  Positive for arthralgias, back pain and myalgias. Negative for gait problem.   Skin:  Negative for pallor and rash.   Neurological:  Negative for dizziness and weakness.   Psychiatric/Behavioral:  Negative for dysphoric mood and sleep disturbance. The patient is not nervous/anxious.        Objective   Vital Signs:  /74   Pulse 52   Temp 98.4 °F (36.9 °C)   Ht 177.8 cm (70\")   Wt 82.6 kg (182 lb)   SpO2 94% Comment: ra  BMI 26.11 kg/m²     Physical Exam  Constitutional:       General: He is not in acute distress.     Appearance: Normal appearance.   HENT:      Head: Normocephalic and atraumatic.      Right Ear: Tympanic membrane and external ear normal.      Left Ear: Tympanic membrane and external ear normal.      Nose: Nose normal.      Mouth/Throat:      Mouth: Mucous membranes are moist.   Eyes:      General: No scleral icterus.  Neck:      Vascular: No carotid bruit.   Cardiovascular:      Rate and Rhythm: Normal rate and regular rhythm.      Pulses: Normal pulses.      Heart sounds: Normal heart sounds. No murmur heard.     No friction rub. No gallop.   Pulmonary:      Effort: Pulmonary effort is normal.      Breath sounds: Normal breath sounds. No rhonchi or rales.   Abdominal:      General: Bowel sounds are normal. There is no distension.      Palpations: Abdomen is soft.      Tenderness: There is no right CVA tenderness, left CVA tenderness, guarding or rebound.      Hernia: No hernia is present.   Genitourinary:     Rectum: Normal. Guaiac result negative.      Comments: Chaperone +  Moderately enlarged prostate, no nodules.  Musculoskeletal:         " General: Tenderness (neck, shoulder, back and knees) present. Normal range of motion.      Cervical back: Normal range of motion.      Right lower leg: No edema.      Left lower leg: No edema.   Lymphadenopathy:      Cervical: No cervical adenopathy.   Skin:     General: Skin is warm.      Findings: No rash.   Neurological:      General: No focal deficit present.      Mental Status: He is alert and oriented to person, place, and time. Mental status is at baseline.      Cranial Nerves: No cranial nerve deficit.      Sensory: No sensory deficit.      Coordination: Coordination normal.      Gait: Gait normal.      Deep Tendon Reflexes: Reflexes normal.   Psychiatric:         Mood and Affect: Mood normal.         Behavior: Behavior normal.                          Current Outpatient Medications:     azelastine (ASTELIN) 0.1 % nasal spray, Every 12 (Twelve) Hours., Disp: , Rfl:     B Complex Vitamins (vitamin b complex) capsule capsule, Take  by mouth Daily., Disp: , Rfl:     celecoxib (CeleBREX) 400 MG capsule, Take 1 capsule by mouth Daily., Disp: , Rfl:     Cholecalciferol 25 MCG (1000 UT) capsule, Take 1 capsule by mouth Daily., Disp: , Rfl:     folic acid (FOLVITE) 1 MG tablet, Take 2 tablets by mouth Daily., Disp: , Rfl:     levothyroxine (SYNTHROID, LEVOTHROID) 75 MCG tablet, Take 1 tablet by mouth Daily., Disp: 90 tablet, Rfl: 3    methotrexate 2.5 MG tablet, Take 8 tablets by mouth 1 (One) Time Per Week., Disp: , Rfl:     omeprazole (priLOSEC) 20 MG capsule, Take 1 capsule by mouth Daily., Disp: 90 capsule, Rfl: 3    sildenafil (VIAGRA) 100 MG tablet, Take 1 tablet by mouth Daily As Needed for Erectile Dysfunction., Disp: 30 tablet, Rfl: 3      The following portions of the patient's history were reviewed and updated as appropriate: allergies, current medications, past family history, past medical history, past social history, past surgical history and problem list.        Objective   /74   Pulse 52    "Temp 98.4 °F (36.9 °C)   Ht 177.8 cm (70\")   Wt 82.6 kg (182 lb)   SpO2 94% Comment: ra  BMI 26.11 kg/m²         Results for orders placed or performed in visit on 10/09/23   TSH Rfx On Abnormal To Free T4    Specimen: Blood   Result Value Ref Range    TSH 3.230 0.270 - 4.200 uIU/mL   Comprehensive Metabolic Panel    Specimen: Blood   Result Value Ref Range    Glucose 94 65 - 99 mg/dL    BUN 19 8 - 23 mg/dL    Creatinine 0.97 0.76 - 1.27 mg/dL    Sodium 142 136 - 145 mmol/L    Potassium 4.3 3.5 - 5.2 mmol/L    Chloride 105 98 - 107 mmol/L    CO2 27.9 22.0 - 29.0 mmol/L    Calcium 9.9 8.6 - 10.5 mg/dL    Total Protein 7.3 6.0 - 8.5 g/dL    Albumin 4.9 3.5 - 5.2 g/dL    ALT (SGPT) 20 1 - 41 U/L    AST (SGOT) 25 1 - 40 U/L    Alkaline Phosphatase 68 39 - 117 U/L    Total Bilirubin 0.4 0.0 - 1.2 mg/dL    Globulin 2.4 gm/dL    A/G Ratio 2.0 g/dL    BUN/Creatinine Ratio 19.6 7.0 - 25.0    Anion Gap 9.1 5.0 - 15.0 mmol/L    eGFR 88.3 >60.0 mL/min/1.73             Assessment & Plan   Diagnoses and all orders for this visit:    Medicare annual wellness visit, subsequent    Other specified hypothyroidism  Comments:  stable, continue current regimen.  Orders:  -     levothyroxine (SYNTHROID, LEVOTHROID) 75 MCG tablet; Take 1 tablet by mouth Daily.  -     TSH Rfx On Abnormal To Free T4; Future    Gastroesophageal reflux disease with esophagitis without hemorrhage  -     omeprazole (priLOSEC) 20 MG capsule; Take 1 capsule by mouth Daily.  -     CBC (No Diff); Future    Erectile dysfunction, unspecified erectile dysfunction type  -     sildenafil (VIAGRA) 100 MG tablet; Take 1 tablet by mouth Daily As Needed for Erectile Dysfunction.    Vitamin D deficiency  -     Vitamin D,25-Hydroxy; Future  -     Vitamin B12; Future    Hyperglycemia  -     Hemoglobin A1c; Future    Dyslipidemia  -     Comprehensive Metabolic Panel; Future  -     Lipid Panel; Future    Rheumatoid arthritis with positive rheumatoid factor, involving " unspecified site  Comments:  Adv to discuss sxs with Rheum if needs injectible.    Benign prostatic hyperplasia with lower urinary tract symptoms, symptom details unspecified  -     PSA DIAGNOSTIC; Future    Chronic right shoulder pain  Comments:  call if needs referral as Dr. Reyes may have retired.    Other orders  -     celecoxib (CeleBREX) 400 MG capsule; Take 1 capsule by mouth Daily.               PHQ-2/PHQ-9 Depression screening reviewed with patient . Total time spent today for depression screening  with Philippe Mixon  was 15 minutes in counseling, along with plans for any diagnositc work-up and treatment.    Advice and education were given regarding cardiovascular risk reduction, healthy dietary habits, Seatbelt and helmet use and self skin examination.          Electronically signed by:    Stephanie Livingston MD                Follow Up   Return in about 6 months (around 10/17/2024) for Next scheduled follow up and wellness in 1 yr.  Patient was given instructions and counseling regarding his condition or for health maintenance advice. Please see specific information pulled into the AVS if appropriate.

## 2024-05-07 RX ORDER — METHOTREXATE 2.5 MG/1
TABLET ORAL
Qty: 48 TABLET | Refills: 3 | Status: SHIPPED | OUTPATIENT
Start: 2024-05-07

## 2024-05-21 ENCOUNTER — TELEPHONE (OUTPATIENT)
Dept: PAIN MEDICINE | Facility: CLINIC | Age: 64
End: 2024-05-21
Payer: MEDICARE

## 2024-05-21 NOTE — TELEPHONE ENCOUNTER
I called patient to reschedule his follow up appointment with Michelle Garcia. Patient is driving , and I told him I will call him back.

## 2024-05-22 ENCOUNTER — TELEPHONE (OUTPATIENT)
Dept: PAIN MEDICINE | Facility: CLINIC | Age: 64
End: 2024-05-22
Payer: MEDICARE

## 2024-05-22 NOTE — TELEPHONE ENCOUNTER
[facility] staff attempted to follow warm transfer process and was unsuccessful     Caller: Philippe Mixon    Relationship to patient: Self    Best call back number: [phone]    Patient is needing: PATIENT RETURNED CALL TO RESCHEDULE FOLLOW UP APPOINTMENT 06/13/2024 WITH PETER ROBERTO FIRST AVAILABLE 06/24/2024 AND PATIENT STATES HE WAS OFFERED SOONER AND JUST COULDN'T TAKE CALL BECAUSE HE WAS DRIVING.

## 2024-06-18 ENCOUNTER — OFFICE VISIT (OUTPATIENT)
Dept: PAIN MEDICINE | Facility: CLINIC | Age: 64
End: 2024-06-18
Payer: MEDICARE

## 2024-06-18 VITALS — WEIGHT: 188.6 LBS | HEIGHT: 70 IN | BODY MASS INDEX: 27 KG/M2

## 2024-06-18 DIAGNOSIS — M47.816 SPONDYLOSIS OF LUMBAR REGION WITHOUT MYELOPATHY OR RADICULOPATHY: ICD-10-CM

## 2024-06-18 DIAGNOSIS — M50.30 DDD (DEGENERATIVE DISC DISEASE), CERVICAL: ICD-10-CM

## 2024-06-18 DIAGNOSIS — M05.9 RHEUMATOID ARTHRITIS WITH POSITIVE RHEUMATOID FACTOR, INVOLVING UNSPECIFIED SITE: ICD-10-CM

## 2024-06-18 DIAGNOSIS — M47.812 CERVICAL SPONDYLOSIS WITHOUT MYELOPATHY: ICD-10-CM

## 2024-06-18 DIAGNOSIS — M51.16 INTERVERTEBRAL DISC DISORDER WITH RADICULOPATHY OF LUMBAR REGION: ICD-10-CM

## 2024-06-18 DIAGNOSIS — M54.81 BILATERAL OCCIPITAL NEURALGIA: ICD-10-CM

## 2024-06-18 DIAGNOSIS — M79.18 MYOFASCIAL PAIN: ICD-10-CM

## 2024-06-18 PROCEDURE — 99214 OFFICE O/P EST MOD 30 MIN: CPT | Performed by: NURSE PRACTITIONER

## 2024-06-18 PROCEDURE — 1159F MED LIST DOCD IN RCRD: CPT | Performed by: NURSE PRACTITIONER

## 2024-06-18 PROCEDURE — 1125F AMNT PAIN NOTED PAIN PRSNT: CPT | Performed by: NURSE PRACTITIONER

## 2024-06-18 PROCEDURE — 1160F RVW MEDS BY RX/DR IN RCRD: CPT | Performed by: NURSE PRACTITIONER

## 2024-06-18 NOTE — PROGRESS NOTES
"Chief Complaint: \"Neck and lower back pain.\"       History of Present Illness:  Mr. Philippe Mixon, 64 y.o. male originally referred by Dr. Stephanie Livingston in consultation for chronic intractable neck pain and headaches. Patient reports a 1-2 year history of neck pain, which began without incident.  He also has a history of lower back difficulties.  A recent MRI of the lumbar spine demonstrates diffuse facet hypertrophy with areas of disc bulge.  At L3-L4, there is facet hypertrophy with a disc bulge which creates mild central spinal stenosis and moderate left and mild right neuroforaminal stenosis.  At L4-L5 there is facet hypertrophy with disc bulging, which contributes to mild central spinal stenosis and severe bilateral neuroforaminal stenosis.  He was experiencing symptoms extending into his left lower extremity consistent with MRI findings.  Therefore on November 22, 2023 he underwent a diagnostic and therapeutic left L3-L4 and left L4-L5 transforaminal epidural steroid injection, from which he reports experiencing almost complete resolution of his left lower extremity pain.  He does continue with lower back difficulties, as well as numbness to his right thigh.  Additionally on October 16, 2023, he underwent bilateral cervical medial branch rhizotomies, from which overall he reports experiencing almost complete pain relief and functional improvement lasting 3 months, with an ongoing 50% lasting an additional 3 months, with gradual recurrence. He is here today for reevaluation of his neck pain, and lower back pain.      Problem #1: Neck Pain:  Pain Description: constant neck pain and occipital headaches with intermittent exacerbation, described as aching, stabbing, throbbing, and sharp sensation.   Radiation of Pain: The pain radiates into the occipital region causing headaches and into the bilateral shoulders.   Pain intensity today: 5/10  Average pain intensity last week: 5/10  Pain intensity ranges from: " 5/10 to 8/10  Aggravating factors: Pain increases with extension, rotation of the cervical spine, flexion   Alleviating factors: Pain decreases with oral analgesics   Associated Symptoms:   Patient denies pain, numbness, or weakness in the upper extremities.  He does report some pain extending into his arms stopping above at the elbows, from which he relates to prior rotator cuff surgeries.  Patient denies any new bladder or bowel problems.   Patient reports difficulties with his balance but denies recent falls (related to his chronic back pain).   Patient reports bilateral cervicogenic and occipital headaches 3-4x a week lasting several hours.    Problem #1: Lower back and lower extremity pain:  Pain History: Patient reports a longstanding history of greater than 10 years of lower back pain.  He tells me he has previously seen Dr. Rodríguez in the past, and was found not to be a surgical candidate.    Pain Description: constant pain with intermittent exacerbation, described as aching, numbing, sharp, and stabbing sensation.   Radiation of Pain: The pain radiates into the hips, and lateral aspect of his thighs and into the right calf RT>LT  Pain intensity today: 5/10  Average pain intensity last week: 5/10  Pain intensity ranges from: 5/10 to 8/10  Aggravating factors: Pain increases with driving, sitting, bending, twisting, standing, walking. Patient describes neurogenic claudication.    Alleviating factors: Pain decreases with sitting, lying down     Associated Symptoms:   Patient reports numbness to the right thigh.   Patient denies any new bladder or bowel problems.   Patient reports difficulties with his balance but denies recent falls.   Pain interferes with regular activities, ADLs, and affects patient's quality of life  Pain interferes with general activities (ability to walk, stand, transition from different positions), perform activities of daily living  Pain interferes with sleep causing sleep fragmentation    Stiffness      Review of previous therapies and additional medical records:  Philippe Mixon has already failed the following measures, including:   Conservative Measures: Oral analgesics, topical analgesics, ice, heat, chiropractic therapy, physical therapy   Interventional Measures: Some injections with Dr. Batres more than 8 years ago  04/28/2021: DxTx cervical facet joint injections: bilateral C4-C5, bilateral C5-C6 combined with cervicothoracic interspinous bursa injection  10/27/2021: Bilateral cervical RFA at C4-C5 and C5-C6  10/16/2023: Bilateral cervical RFA at C4-C5 and C5-C6  11/22/2023: DxTx left L3-L4 and left L4-L5 transforaminal epidural steroid injection  Surgical Measures: No history of cervical spine. No history of lumbar or hip surgery. 2011 Left Rotator cuff repair. 2010 Right Rotator cuff repair.   Philippe Mixon has not undergone orthopedic spine or neurosurgical consultation for chronic pain condition   Philippe Mixon presents with significant comorbidities including RA, hypothyroidism  In terms of current analgesics, Philippe Mixon takes: Celebrex and methotrexate  I have reviewed [place] Report is consistent with medication reconciliation.  SOAPP: Low Risk    Global Pain Scale 04-01 2021 08-09 2021 09-19 2023        Pain 14 12 15        Feelings 0 0 1        Clinical outcomes 10 9 4        Activities 14 11 3        GPS Total: 38 32 23            The [place] Back Pain Disability Scale  DATE 09-19 2023 06-18 2024                Sleep through the night 2  2               Turn over in bed 1  1               Get out of bed 1  1               Make your bed 1  0               Put on socks (pantyhose) 0  1               Ride in a car 2  2               Sit in a chair for several hours 2  3               Stand up for 20-30 minutes 1  2               Climb one flight of stairs 2  1               Walk a few blocks (200-300 yards)  2  1               Walk several miles 4  3                Run one block (about 50 yards) 2  3               Take food out of the refrigerator 1  1               Reach up to high shelves 1  1               Move a chair 0  1               Pull or push heavy doors 1  1               Bend over to clean the bathtub 2  2               Throw a ball 2  2               Carry two bags of groceries 1  1               Lift and carry a heavy suitcase 1  2               Total score 29  32                   NECK PAIN DISABILITY INDEX QUESTIONNAIRE  DATE 09-19 2023            Pain intensity  0: No pain  1: Mild  2: Moderate  3: Fairly severe  4: very severe  5: Worst imaginable 3           Personal Care   0: I can look after myself normally without causing extra pain.  1: I can look after myself normally, but it causes extra pain.  2: It is painful to look after myself and I am slow and careful.  3: I need some help, but manage most of my personal care.  4: I need help every day in most aspects of self-care.  5: I do not get dressed; I wash with difficulty and stay in bed. 1            Lifting  0: I can lift heavy weights without extra pain.  1: I can lift heavy weights, but it gives extra pain.  2: Pain prevents me from lifting heavy weights off the floor but I can manage if they are conveniently positioned, for example, on a table.  3: Pain prevents me from lifting heavy weights, but I can manage light to medium weights if they are conveniently positioned.  4: I can lift very light weights.  5: I cannot lift or carry anything at all. 2            Reading  0: I can read as much as I want to with no pain in my neck.  1: I can read as much as I want to with slight pain in my neck.  2: I can read as much as I want to with moderate pain in my neck.  3: I cannot read as much as I want because of moderate pain in my neck.  4: I cannot read as much as I want because of severe pain in my neck.  5: I cannot read at all. 2            Headaches  0: I have no headaches at all.  1: I have slight  headaches which come infrequently.  2: I have moderate headaches which come infrequently.  3: I have moderate headaches which come frequently.  4: I have severe headaches which come frequently.  5: I have headaches almost all the time. 3            Concentration  0: I can concentrate fully when I want to with no difficulty.  1: I can concentrate fully when I want to with slight difficulty.  2: I have a fair degree of difficulty in concentrating when I want to.  3: I have a lot of difficulty in concentrating when I want to.  4: I have a great deal of difficulty in concentrating when I want to.  5: I cannot concentrate at all. 1            Work  0: I can do as much work as I want to.  1: I can only do my usual work, but no more.  2: I can do most of my usual work, but no more.  3: I cannot do my usual work.  4: I can hardly do any work at all.  5: I cannot do any work at all. 2            Driving  0: I can drive my car without any neck pain.  1: I can drive my car as long as I want with slight pain in my neck.  2: I can drive my car as long as I want with moderate pain in my   neck.  3: I cannot drive my car as long as I want because of moderate pain   in my neck.  4: I can hardly drive at all because of severe pain in my neck.  5: I cannot drive my car at all. 2            Sleeping  0: I have no trouble sleeping.  1: My sleep is slightly disturbed (less than 1 hour sleepless).  2: My sleep is mildly disturbed (1-2 hours sleepless).  3: My sleep is moderately disturbed (2-3 hours sleepless).  4: My sleep is greatly disturbed (3-5 hours sleepless).  5: My sleep is completely disturbed (5-7 hours) 1            Recreation  0: I am able to engage in all of my recreational activities with no neck pain at all.  1: I am able to engage in all of my recreational activities with some pain in my neck.  2: I am able to engage in most, but not all of my recreational activities because of pain in my neck.  3: I am able to engage in  a few of my recreational activities because of pain in my neck.  4: I can hardly do any recreational activities because of pain in my neck.  5: I cannot do any recreational activities at all. 3            TOTAL SCORE 20             Review of New Diagnostic Studies:  MRI of the lumbar spine without contrast 11/1/2023:  L1-L2: Facet hypertrophy with a mild disc bulge, no significant spinal canal neuroforaminal stenosis.  L2-L3: Facet hypertrophy with a mild disc bulge without significant spinal canal neuroforaminal stenosis.  L3-L4: Facet hypertrophy with a disc bulge, which creates mild central spinal stenosis with moderate left and mild right neuroforaminal stenosis.  L4-L5: Disc bulge with facet hypertrophy with mild central spinal stenosis and severe bilateral neuroforaminal stenosis.  L5-S1: Disc bulge with facet hypertrophy without significant spinal canal neuroforaminal stenosis.  Bilateral hip x-rays 9/19/2023: Mild osteoarthritis of the hips as well as the sacroiliac joints  Lumbar spine x-rays with flexion-extension views 9/19/2023: No evidence of listhesis or instability.  Multilevel spondylosis with areas of disc space height loss and facet arthritis.    Review of Diagnostic Studies:  MRI of the cervical spine without contrast 4/9/2022: I have reviewed the imaging and the radiology report.  Vertebral body heights are well-maintained without evidence of malalignment.  Multilevel spondylosis.  C2-C3: Disc osteophyte complex and facet arthritis.  Mild right neuroforaminal stenosis.  C3-C4: Disc osteophyte complex with bilateral facet arthritis.  Mild central spinal canal stenosis and moderate to severe bilateral neuroforaminal stenosis, greater on the right.  C4-C5: Disc osteophyte complex and facet arthritis.  Moderate central spinal canal stenosis and bilateral neuroforaminal stenosis.  C5-C6: Disc osteophyte complex with facet arthritis.  Moderate central spinal canal stenosis and moderate right and severe  left neuroforaminal stenosis.  C6-C7: Disc osteophyte complex with mild to moderate narrowing of the left spinal canal.  With mild bilateral neuroforaminal stenosis.      Review of Systems   Constitutional:  Positive for activity change.   Eyes:  Positive for photophobia and pain.   Musculoskeletal:  Positive for arthralgias, back pain, joint swelling, myalgias, neck pain and neck stiffness.   Neurological:  Positive for dizziness, weakness, light-headedness and headaches.   All other systems reviewed and are negative.        Patient Active Problem List   Diagnosis    Esophageal reflux    Hyperglycemia    Hypothyroidism    Vitamin D deficiency    Duodenitis    Dyslipidemia    Family history of prostate cancer in father    Cervical spondylosis without myelopathy    DDD (degenerative disc disease), cervical    Rheumatoid arthritis with positive rheumatoid factor    Myofascial pain    Occipital headache    Insomnia due to medical condition    Cervicothoracic interspinous bursitis    Deviated nasal septum    Epigastric abdominal tenderness    Finger injury    Impacted cerumen    Retinal defect       Past Medical History:   Diagnosis Date    Abdominal tenderness, epigastric     Arthritis     Finger injury     Headache     Hypothyroidism     Low back pain Unknown    Rheumatoid arthritis     Visual impairment          Past Surgical History:   Procedure Laterality Date    COLONOSCOPY  2/24/2023    EYE SURGERY      Repair    EYE SURGERY Left 2018    Cataract Removal    KNEE SURGERY Right 12/03/2021    right knee arthroscopy with partial medial [facility]iscectomy [facility]    RETINAL DETACHMENT REPAIR Left 2016    ROTATOR CUFF REPAIR Left 2010    [facility] leonid    ROTATOR CUFF REPAIR Right 2011    [facility] Orange    SHOULDER ARTHROSCOPY           Family History   Problem Relation Age of Onset    Arthritis Other     Cancer Other     Diabetes Other     Hypertension Other     Thyroid disease Other     Arthritis Mother     Diabetes  "Mother     Hyperlipidemia Mother     Thyroid disease Mother     Cancer Father     Cancer Sister     COPD Sister     Cancer Brother     Cancer Daughter          Social History     Socioeconomic History    Marital status:    Tobacco Use    Smoking status: Former     Current packs/day: 0.00     Average packs/day: 1.5 packs/day for 15.0 years (22.5 ttl pk-yrs)     Types: Cigarettes     Start date: 1975     Quit date: 1990     Years since quittin.6    Smokeless tobacco: Never   Vaping Use    Vaping status: Never Used   Substance and Sexual Activity    Alcohol use: No    Drug use: Never    Sexual activity: Yes     Partners: Female     Birth control/protection: Surgical           Current Outpatient Medications:     azelastine (ASTELIN) 0.1 % nasal spray, Every 12 (Twelve) Hours., Disp: , Rfl:     B Complex Vitamins (vitamin b complex) capsule capsule, Take  by mouth Daily., Disp: , Rfl:     celecoxib (CeleBREX) 400 MG capsule, Take 1 capsule by mouth Daily., Disp: , Rfl:     Cholecalciferol 25 MCG (1000 UT) capsule, Take 1 capsule by mouth Daily., Disp: , Rfl:     folic acid (FOLVITE) 1 MG tablet, Take 2 tablets by mouth Daily., Disp: , Rfl:     levothyroxine (SYNTHROID, LEVOTHROID) 75 MCG tablet, Take 1 tablet by mouth Daily., Disp: 90 tablet, Rfl: 3    methotrexate 2.5 MG tablet, TAKE 8 TABLETS BY MOUTH  WEEKLY, Disp: 48 tablet, Rfl: 3    omeprazole (priLOSEC) 20 MG capsule, Take 1 capsule by mouth Daily., Disp: 90 capsule, Rfl: 3    sildenafil (VIAGRA) 100 MG tablet, Take 1 tablet by mouth Daily As Needed for Erectile Dysfunction., Disp: 30 tablet, Rfl: 3      No Known Allergies      Ht 177.8 cm (70\")   Wt 85.5 kg (188 lb 9.6 oz)   BMI 27.06 kg/m²       Physical Exam:  Constitutional: Patient appears well-developed, well-nourished, well-hydrated  HEENT: Head: Normocephalic and atraumatic  Eyes: Conjunctivae and lids are normal  Pupils: Equal, round, reactive to light  Neck: Trachea normal. Neck " supple. No JVD present.   Lymphatic: No cervical adenopathy  Pulmonary: No increased work of breathing or signs of respiratory distress.   Musculoskeletal   Gait and station: Gait evaluation demonstrated a normal gait.    Cervical spine: Passive and active range of motion are somewhat limited with pain. Extension, lateral flexion, rotation of the cervical spine increased and reproduced pain. Cervical facet joint loading maneuvers are positive.  There is no pain upon palpation of the cervicothoracic interspinous bursa today.  Muscles: Presence of active trigger points at the levator scapulae   Shoulders: The range of motion of the glenohumeral joints is slightly limited bilaterally. Rotator cuff strength is 5/5.   Lumbar spine: Passive and active range of motion are limited secondary to pain. Extension, lateral flexion, rotation of the lumbar spine increased and reproduced pain. Lumbar facet joint loading maneuvers are positive.  Kris test and Gaenslen's test are negative.   Piriformis maneuvers are negative.    Palpation of the bilateral ischial tuberosities, unrevealing.    Palpation of the bilateral greater trochanter, unrevealing.    Examination of the Iliotibial band: unrevealing.    Hip joints: The range of motion of the hip joints is somewhat limited to flexion and internal rotation on the bilaterally but without significant pain.  Neurological:   Patient is alert and oriented to person, place, and time.   Speech: Normal.   Cortical function: Normal mental status.   Cranial nerves 2-12: intact.   Reflex Scores:  Right brachioradialis: 2+  Left brachioradialis: 2+  Right biceps: 2+  Left biceps: 2+  Right triceps: 2+  Left triceps: 2+  Right patellar: 1+  Left patellar: 1+  Right Achilles: 1+  Left Achilles: 1+  Motor strength: 5/5  Motor Tone: Normal .   Involuntary movements: None.   Superficial/Primitive Reflexes: Primitive reflexes were absent.   Right Bai: Absent  Left Bai: Absent  Right ankle  clonus: Absent  Left ankle clonus: Absent   Babinsky: Absent  Spurling sign: negative. Neck tornado test: Negative. Lhermitte sign: Negative. Long tract signs: Negative. Straight leg raising test: Positive on the right. Femoral stretch sign: Negative.   Sensory exam: Intact to light touch, intact pain and temperature sensation, intact vibration sensation and normal proprioception.   Coordination: Normal finger to nose and heel to shin. Normal balance and negative Romberg's sign   Skin and subcutaneous tissue: Skin is warm and intact. No rash noted. No cyanosis.   Psychiatric: Judgment and insight: Normal. Orientation to person, place and time: Normal. Recent and remote memory: Intact. Mood and affect: Normal.              ASSESSMENT:   1. Cervical spondylosis without myelopathy    2. Bilateral occipital neuralgia    3. DDD (degenerative disc disease), cervical    4. Spondylosis of lumbar region without myelopathy or radiculopathy    5. Intervertebral disc disorder with radiculopathy of lumbar region    6. Myofascial pain    7. Rheumatoid arthritis with positive rheumatoid factor, involving unspecified site            PLAN/MEDICAL DECISION MAKING:  Mr. Philippe Mixon, 64 y.o. male reports a several year history of neck pain.  MRI of the cervical spine without contrast on 4/9/2022 revealed diffuse cervical spondylosis and multilevel degenerative disc disease, and areas of central spinal canal stenosis as well as bilateral neuroforaminal stenosis. Patient has been diagnosed with rheumatoid arthritis and is treated by Dr. Vega.  He continues to have elements of mechanical/axial neck pain, with occipital headaches.  He denies any obvious radicular complaints.  He also has lower back difficulties. MRI of the lumbar spine demonstrates diffuse facet hypertrophy with areas of disc bulge.  At L3-L4, there is facet hypertrophy with a disc bulge which creates mild central spinal stenosis and moderate left and mild right  neuroforaminal stenosis.  At L4-L5 there is facet hypertrophy with disc bulging, which contributes to mild central spinal stenosis and severe bilateral neuroforaminal stenosis.  He was experiencing symptoms extending into his left lower extremity consistent with MRI findings.  Therefore on November 22, 2023 he underwent a diagnostic and therapeutic left L3-L4 and left L4-L5 transforaminal epidural steroid injection, from which he reports experiencing almost complete resolution of his left lower extremity pain.  He does continue with lower back difficulties, as well as numbness to his right thigh.  Additionally on October 16, 2023, he underwent bilateral cervical medial branch rhizotomies, from which overall he reports experiencing almost complete pain relief and functional improvement lasting 3 months, with an ongoing 50% lasting an additional 3 months, with gradual recurrence.  He has not really enthusiastic about undergoing more injections.  Therefore, I recommended he keep me updated with his ongoing progress.  I discussed with him conservative measures, such as use of TENS unit, physical therapy with myofascial release as well as dry needling.  Patient has failed to obtain pain relief with conservative measures including oral analgesics and physical therapy. I have reviewed all available patient's medical records as well as previous therapies as referenced above. I had a lengthy conversation with Mr. Philippe Mixon regarding his chronic pain condition and potential therapeutic options including risks, benefits, alternative therapies, to name a few. Therefore, I have proposed the following plan:  1. Pharmacological measures: Reviewed and discussed;   A. Patient takes Celebrex.  Patient has declined additional phonological measures.  2. Diagnostics:  A. EMG/NCV of the bilateral lower extremities  2. Interventional pain management measures:   A. Treatment of Neck Pain: None indicated this time.  If his neck  pain and headaches increase we discussed repeating bilateral cervical medial branch rhizotomies at C4, C5, C6; for bilateral cervical facet joints at C4-C5, C5-C6. Otherwise we may consider cervical epidural steroid injection by interlaminar approach.  Otherwise, we may consider diagnostic and therapeutic bilateral greater occipital nerve blocks by hydrodissection technique under ultrasound and fluoroscopic guidance if his headaches continue to be bothersome.  B. Treatment of Lower back pain: Addendum: If his left lower extremity pain recurs we may repeat left L3-L4 and left L4-L5 transforaminal epidural steroid injections.  We may repeat epidural depending on patient's outcome.  He also has elements of mechanical lower back pain, from which we may consider lumbar MBB's and lumbar rhizotomy.  3. Long-term rehabilitation efforts:  A. The patient does not have a history of falls. I did complete a risk assessment for falls  B. Patient will start a comprehensive physical therapy program for upper body strengthening/posture correction, core strengthening, gait and balance training, ultrasound, ASTYM, myofascial release, cupping and dry needling   C. Start an exercise program such as yoga, water therapy and swimming  D. Contrast therapy: Apply ice-packs for 15-20 minutes, followed by heating pads for 15-20 minutes to affected area   4. The patient has been instructed to contact my office with any questions or difficulties. The patient understands the plan and agrees to proceed accordingly.    Philippe Mixon reports a pain score of 6.  Given his pain assessment as noted, treatment options were discussed and the following options were decided upon as a follow-up plan to address the patient's pain: continuation of current treatment plan for pain, home exercises and therapy, patient declined analgesics, patient not interested in pharmacological measures, referral to Physical Therapy, and use of non-medical modalities  (ice, heat, stretching and/or behavior modifications).      Pain Medications               celecoxib (CeleBREX) 400 MG capsule Take 1 capsule by mouth Daily.              Patient Care Team:  Stephanie Livingston MD as PCP - General  Michelle Stahl APRN as Nurse Practitioner (Nurse Practitioner)     No orders of the defined types were placed in this encounter.        Future Appointments   Date Time Provider Department [place]   10/16/2024  8:00 AM Stephanie Livingston MD [facility]   4/23/2025  7:30 AM Stephanie Livingston MD [facility]         MORGAN Rosales

## 2024-10-16 ENCOUNTER — OFFICE VISIT (OUTPATIENT)
Dept: INTERNAL MEDICINE | Facility: CLINIC | Age: 64
End: 2024-10-16
Payer: MEDICARE

## 2024-10-16 ENCOUNTER — LAB (OUTPATIENT)
Dept: LAB | Facility: HOSPITAL | Age: 64
End: 2024-10-16
Payer: MEDICARE

## 2024-10-16 VITALS
BODY MASS INDEX: 26.92 KG/M2 | OXYGEN SATURATION: 99 % | WEIGHT: 188 LBS | DIASTOLIC BLOOD PRESSURE: 60 MMHG | SYSTOLIC BLOOD PRESSURE: 124 MMHG | HEIGHT: 70 IN | TEMPERATURE: 98.1 F

## 2024-10-16 DIAGNOSIS — R97.20 ELEVATED PSA: ICD-10-CM

## 2024-10-16 DIAGNOSIS — E03.8 OTHER SPECIFIED HYPOTHYROIDISM: ICD-10-CM

## 2024-10-16 DIAGNOSIS — B96.81 HELICOBACTER PYLORI GASTRITIS: ICD-10-CM

## 2024-10-16 DIAGNOSIS — K21.9 GASTROESOPHAGEAL REFLUX DISEASE, UNSPECIFIED WHETHER ESOPHAGITIS PRESENT: ICD-10-CM

## 2024-10-16 DIAGNOSIS — K21.9 GASTROESOPHAGEAL REFLUX DISEASE, UNSPECIFIED WHETHER ESOPHAGITIS PRESENT: Primary | ICD-10-CM

## 2024-10-16 DIAGNOSIS — K29.70 HELICOBACTER PYLORI GASTRITIS: ICD-10-CM

## 2024-10-16 DIAGNOSIS — K21.00 GASTROESOPHAGEAL REFLUX DISEASE WITH ESOPHAGITIS WITHOUT HEMORRHAGE: ICD-10-CM

## 2024-10-16 DIAGNOSIS — M47.812 CERVICAL SPONDYLOSIS WITHOUT MYELOPATHY: ICD-10-CM

## 2024-10-16 LAB
ALBUMIN SERPL-MCNC: 4.5 G/DL (ref 3.5–5.2)
ALBUMIN/GLOB SERPL: 1.9 G/DL
ALP SERPL-CCNC: 69 U/L (ref 39–117)
ALT SERPL W P-5'-P-CCNC: 38 U/L (ref 1–41)
ANION GAP SERPL CALCULATED.3IONS-SCNC: 8.8 MMOL/L (ref 5–15)
AST SERPL-CCNC: 41 U/L (ref 1–40)
BILIRUB SERPL-MCNC: 0.7 MG/DL (ref 0–1.2)
BUN SERPL-MCNC: 25 MG/DL (ref 8–23)
BUN/CREAT SERPL: 20 (ref 7–25)
CALCIUM SPEC-SCNC: 9.4 MG/DL (ref 8.6–10.5)
CHLORIDE SERPL-SCNC: 105 MMOL/L (ref 98–107)
CHOLEST SERPL-MCNC: 125 MG/DL (ref 0–200)
CO2 SERPL-SCNC: 26.2 MMOL/L (ref 22–29)
CREAT SERPL-MCNC: 1.25 MG/DL (ref 0.76–1.27)
EGFRCR SERPLBLD CKD-EPI 2021: 64.7 ML/MIN/1.73
GLOBULIN UR ELPH-MCNC: 2.4 GM/DL
GLUCOSE SERPL-MCNC: 91 MG/DL (ref 65–99)
HDLC SERPL-MCNC: 62 MG/DL (ref 40–60)
LDLC SERPL CALC-MCNC: 51 MG/DL (ref 0–100)
LDLC/HDLC SERPL: 0.85 {RATIO}
POTASSIUM SERPL-SCNC: 4.4 MMOL/L (ref 3.5–5.2)
PROT SERPL-MCNC: 6.9 G/DL (ref 6–8.5)
PSA SERPL-MCNC: 5.12 NG/ML (ref 0–4)
SODIUM SERPL-SCNC: 140 MMOL/L (ref 136–145)
TRIGL SERPL-MCNC: 50 MG/DL (ref 0–150)
TSH SERPL DL<=0.05 MIU/L-ACNC: 1.72 UIU/ML (ref 0.27–4.2)
VLDLC SERPL-MCNC: 12 MG/DL (ref 5–40)

## 2024-10-16 PROCEDURE — 1125F AMNT PAIN NOTED PAIN PRSNT: CPT | Performed by: INTERNAL MEDICINE

## 2024-10-16 PROCEDURE — 99214 OFFICE O/P EST MOD 30 MIN: CPT | Performed by: INTERNAL MEDICINE

## 2024-10-16 PROCEDURE — 84153 ASSAY OF PSA TOTAL: CPT

## 2024-10-16 PROCEDURE — 80061 LIPID PANEL: CPT

## 2024-10-16 PROCEDURE — 1160F RVW MEDS BY RX/DR IN RCRD: CPT | Performed by: INTERNAL MEDICINE

## 2024-10-16 PROCEDURE — 1159F MED LIST DOCD IN RCRD: CPT | Performed by: INTERNAL MEDICINE

## 2024-10-16 PROCEDURE — 84443 ASSAY THYROID STIM HORMONE: CPT

## 2024-10-16 PROCEDURE — 80053 COMPREHEN METABOLIC PANEL: CPT

## 2024-10-16 PROCEDURE — 83013 H PYLORI (C-13) BREATH: CPT

## 2024-10-16 RX ORDER — ONDANSETRON 4 MG/1
4 TABLET, FILM COATED ORAL EVERY 8 HOURS PRN
Qty: 30 TABLET | Refills: 3 | Status: SHIPPED | OUTPATIENT
Start: 2024-10-16

## 2024-10-16 RX ORDER — OMEPRAZOLE 40 MG/1
40 CAPSULE, DELAYED RELEASE ORAL DAILY
Qty: 90 CAPSULE | Refills: 1 | Status: SHIPPED | OUTPATIENT
Start: 2024-10-16 | End: 2024-10-19

## 2024-10-16 NOTE — PROGRESS NOTES
Hypothyroidism, Nausea (4 days a week, thinks MTX related), and Follow-up (Elevated PSA, fam hx pros cancer in dad)    Subjective   Philippe Mixon is a 64 y.o. male is here today for follow-up.    Hypothyroidism  Patient reports no diarrhea or palpitations.   Nausea  Associated symptoms include arthralgias, myalgias and nausea. Pertinent negatives include no chest pain, chills, coughing, fever, headaches, sore throat or vomiting.   Follow-upAssociated symptoms include: nausea and myalgias. Pertinent negatives include no chest pain, no confusion, no dizziness, no palpitations, no shortness of breath, no headaches, no cough, no sore throat and no diarrhea.   Hyperlipidemia: Exacerbating diseases: hypothyroidism.     Feels he is nauseated 4 days of the week, and started after being on MTX. Has not brought it up with Rheumatology yet.  On PPI but does not take it daily, just  prn.  Neck and back pain, takes celebrex, but unable to get epidurals, until Jan due to his deductibls.      Current Outpatient Medications:     azelastine (ASTELIN) 0.1 % nasal spray, Every 12 (Twelve) Hours., Disp: , Rfl:     B Complex Vitamins (vitamin b complex) capsule capsule, Take  by mouth Daily., Disp: , Rfl:     celecoxib (CeleBREX) 400 MG capsule, Take 1 capsule by mouth Daily., Disp: , Rfl:     Cholecalciferol 25 MCG (1000 UT) capsule, Take 1 capsule by mouth Daily., Disp: , Rfl:     folic acid (FOLVITE) 1 MG tablet, Take 2 tablets by mouth Daily., Disp: , Rfl:     levothyroxine (SYNTHROID, LEVOTHROID) 75 MCG tablet, Take 1 tablet by mouth Daily., Disp: 90 tablet, Rfl: 3    methotrexate 2.5 MG tablet, TAKE 8 TABLETS BY MOUTH  WEEKLY, Disp: 48 tablet, Rfl: 3    omeprazole (priLOSEC) 40 MG capsule, Take 1 capsule by mouth Daily. 30 min before breakfast, Disp: 90 capsule, Rfl: 1    sildenafil (VIAGRA) 100 MG tablet, Take 1 tablet by mouth Daily As Needed for Erectile Dysfunction., Disp: 30 tablet, Rfl: 3    ondansetron (Zofran) 4 MG  "tablet, Take 1 tablet by mouth Every 8 (Eight) Hours As Needed for Nausea or Vomiting., Disp: 30 tablet, Rfl: 3      The following portions of the patient's history were reviewed and updated as appropriate: allergies, current medications, past family history, past medical history, past social history, past surgical history and problem list.    Review of Systems   Constitutional: Negative.  Negative for chills and fever.   HENT:  Negative for ear discharge, ear pain, sinus pressure and sore throat.    Respiratory:  Negative for cough, chest tightness and shortness of breath.    Cardiovascular:  Negative for chest pain, palpitations and leg swelling.   Gastrointestinal:  Positive for nausea. Negative for diarrhea and vomiting.   Musculoskeletal:  Positive for arthralgias, back pain and myalgias.   Neurological:  Negative for dizziness, syncope and headaches.   Psychiatric/Behavioral:  Negative for confusion and sleep disturbance.        Objective   /60 (BP Location: Left arm, Patient Position: Sitting, Cuff Size: Adult)   Temp 98.1 °F (36.7 °C) (Temporal)   Ht 177 cm (69.69\")   Wt 85.3 kg (188 lb)   SpO2 99%   BMI 27.22 kg/m²   Physical Exam  Vitals and nursing note reviewed.   Constitutional:       Appearance: He is well-developed.   HENT:      Head: Normocephalic and atraumatic.      Right Ear: External ear normal.      Left Ear: External ear normal.      Mouth/Throat:      Pharynx: No oropharyngeal exudate.   Eyes:      Conjunctiva/sclera: Conjunctivae normal.      Pupils: Pupils are equal, round, and reactive to light.   Neck:      Thyroid: No thyromegaly.   Cardiovascular:      Rate and Rhythm: Normal rate and regular rhythm.      Pulses: Normal pulses.      Heart sounds: Normal heart sounds. No murmur heard.     No friction rub. No gallop.   Pulmonary:      Effort: Pulmonary effort is normal.      Breath sounds: Normal breath sounds.   Abdominal:      General: Bowel sounds are normal. There is no " distension.      Palpations: Abdomen is soft.      Tenderness: There is abdominal tenderness (minimal epigastric).   Musculoskeletal:         General: Tenderness present.      Cervical back: Neck supple.   Skin:     General: Skin is warm and dry.   Neurological:      Mental Status: He is alert and oriented to person, place, and time.      Cranial Nerves: No cranial nerve deficit.   Psychiatric:         Judgment: Judgment normal.                 Results for orders placed or performed in visit on 04/17/24   Hemoglobin A1c    Collection Time: 04/17/24  8:16 AM    Specimen: Blood    Blood  Release to rudy   Result Value Ref Range    Hemoglobin A1C 5.50 4.80 - 5.60 %   PSA DIAGNOSTIC    Collection Time: 04/17/24  8:16 AM    Specimen: Blood    Blood  Release to rudy   Result Value Ref Range    PSA 3.800 0.000 - 4.000 ng/mL   Vitamin B12    Collection Time: 04/17/24  8:16 AM    Specimen: Blood    Blood  Release to rudy   Result Value Ref Range    Vitamin B-12 752 211 - 946 pg/mL   Vitamin D,25-Hydroxy    Collection Time: 04/17/24  8:16 AM    Specimen: Blood    Blood  Release to rudy   Result Value Ref Range    25 Hydroxy, Vitamin D 39.6 30.0 - 100.0 ng/ml   TSH Rfx On Abnormal To Free T4    Collection Time: 04/17/24  8:16 AM    Specimen: Blood    Blood  Release to rudy   Result Value Ref Range    TSH 3.480 0.270 - 4.200 uIU/mL   Lipid Panel    Collection Time: 04/17/24  8:16 AM    Specimen: Blood    Blood  Release to rudy   Result Value Ref Range    Total Cholesterol 149 0 - 200 mg/dL    Triglycerides 88 0 - 150 mg/dL    HDL Cholesterol 72 (H) 40 - 60 mg/dL    VLDL Cholesterol Pipo 16 5 - 40 mg/dL    LDL Chol Calc (NIH) 61 0 - 100 mg/dL   Comprehensive Metabolic Panel    Collection Time: 04/17/24  8:16 AM    Specimen: Blood    Blood  Release to rudy   Result Value Ref Range    Glucose 96 65 - 99 mg/dL    BUN 27 (H) 8 - 23 mg/dL    Creatinine 1.04 0.76 - 1.27 mg/dL    EGFR Result 80.7 >60.0 mL/min/1.73    BUN/Creatinine  Ratio 26.0 (H) 7.0 - 25.0    Sodium 142 136 - 145 mmol/L    Potassium 4.6 3.5 - 5.2 mmol/L    Chloride 105 98 - 107 mmol/L    Total CO2 28.4 22.0 - 29.0 mmol/L    Calcium 9.9 8.6 - 10.5 mg/dL    Total Protein 6.7 6.0 - 8.5 g/dL    Albumin 4.5 3.5 - 5.2 g/dL    Globulin 2.2 gm/dL    A/G Ratio 2.0 g/dL    Total Bilirubin 0.5 0.0 - 1.2 mg/dL    Alkaline Phosphatase 79 39 - 117 U/L    AST (SGOT) 24 1 - 40 U/L    ALT (SGPT) 24 1 - 41 U/L   CBC (No Diff)    Collection Time: 04/17/24  8:16 AM    Specimen: Blood    Blood  Release to [facility]   Result Value Ref Range    WBC 7.20 3.40 - 10.80 10*3/mm3    RBC 5.09 4.14 - 5.80 10*6/mm3    Hemoglobin 15.8 13.0 - 17.7 g/dL    Hematocrit 46.7 37.5 - 51.0 %    MCV 91.7 79.0 - 97.0 fL    MCH 31.0 26.6 - 33.0 pg    MCHC 33.8 31.5 - 35.7 g/dL    RDW 13.4 12.3 - 15.4 %    Platelets 273 140 - 450 10*3/mm3             Assessment & Plan   Diagnoses and all orders for this visit:    Gastroesophageal reflux disease, unspecified whether esophagitis present  Comments:  likely causing is nausea, increase PPI and take daily  Orders:  -     Comprehensive Metabolic Panel; Future  -     H. Pylori Breath Test - Breath, Lung; Future    Other specified hypothyroidism  -     Lipid Panel; Future  -     TSH Rfx On Abnormal To Free T4; Future    Cervical spondylosis without myelopathy    Elevated PSA  Comments:  followed by urology  Orders:  -     PSA DIAGNOSTIC; Future    Gastroesophageal reflux disease with esophagitis without hemorrhage  -     omeprazole (priLOSEC) 40 MG capsule; Take 1 capsule by mouth Daily. 30 min before breakfast  -     ondansetron (Zofran) 4 MG tablet; Take 1 tablet by mouth Every 8 (Eight) Hours As Needed for Nausea or Vomiting.             Return for Next scheduled follow up, [employer] [facility].    Electronically signed by:    Stephanie Livingston MD

## 2024-10-17 LAB — UREA BREATH TEST QL: POSITIVE

## 2024-10-19 RX ORDER — OMEPRAZOLE 40 MG/1
CAPSULE, DELAYED RELEASE ORAL
Qty: 28 CAPSULE | Refills: 0 | Status: SHIPPED | OUTPATIENT
Start: 2024-10-19

## 2024-10-19 RX ORDER — CLARITHROMYCIN 500 MG
500 TABLET ORAL 2 TIMES DAILY
Qty: 28 TABLET | Refills: 0 | Status: SHIPPED | OUTPATIENT
Start: 2024-10-19

## 2024-10-19 RX ORDER — AMOXICILLIN 500 MG/1
1000 CAPSULE ORAL 2 TIMES DAILY
Qty: 56 CAPSULE | Refills: 0 | Status: SHIPPED | OUTPATIENT
Start: 2024-10-19

## 2024-10-21 ENCOUNTER — TELEPHONE (OUTPATIENT)
Dept: INTERNAL MEDICINE | Facility: CLINIC | Age: 64
End: 2024-10-21
Payer: MEDICARE

## 2024-10-21 NOTE — TELEPHONE ENCOUNTER
Spoke with patient regarding results and scheduled for follow up appointment.   Patient has questions regarding the avoidance of NSAIDs and taking celecoxib (CeleBREX) 400 MG Sig: Take 1 capsule by mouth Daily.  Is patient to discontinue any current medication?

## 2024-10-21 NOTE — TELEPHONE ENCOUNTER
----- Message from Stephanie Livingston sent at 10/19/2024  9:10 PM EDT -----  Please make sure patient has seen the following [employer] message:    Your H. pylori breath test was positive indicating that you have the ulcer causing bacteria H. pylori.  Please take the following medications as prescribed-clarithromycin, amoxicillin and omeprazole.  Follow-up in 6 weeks once completed to discuss further evaluation.  Your prostate enzyme is higher.  Therefore we will proceed with referral to the urologist as discussed at your visit.  You should get a call soon.  Rest of the labs look good, except for mildly elevated kidney function.  Hydrate well with plenty of fluids and avoid OTC NSAIDs.  Continue current regimen otherwise.

## 2024-10-21 NOTE — TELEPHONE ENCOUNTER
Celebrex should be okay as it is not an NSAID.  He does not need to come off of any of his medications except replace the higher dose omeprazole ( twice daily)with his current omeprazole

## 2024-10-22 ENCOUNTER — TELEPHONE (OUTPATIENT)
Dept: PAIN MEDICINE | Facility: CLINIC | Age: 64
End: 2024-10-22
Payer: MEDICARE

## 2024-10-22 NOTE — TELEPHONE ENCOUNTER
Caller: Oral Philippe Chadwick    Relationship: Self    Best call back number:     Who are you requesting to speak with (clinical staff, provider,  specific staff member): BRITNEY HOFFMAN    What was the call regarding: MR HECK WOULD LIKE TO SPEAK WITH BRITNEY HOFFMAN ABOUT GETTING INJECTIONS IN HIS BACK    Is it okay if the provider responds through MyChart: CALL

## 2024-10-23 DIAGNOSIS — M51.16 INTERVERTEBRAL DISC DISORDER WITH RADICULOPATHY OF LUMBAR REGION: Primary | ICD-10-CM

## 2024-10-25 ENCOUNTER — TELEPHONE (OUTPATIENT)
Dept: PAIN MEDICINE | Facility: CLINIC | Age: 64
End: 2024-10-25
Payer: MEDICARE

## 2024-10-25 NOTE — TELEPHONE ENCOUNTER
FOLLOW UP CALL AFTER PROCEDURE    I spoke with the patient regarding how he  is feeling after his procedure with Dr Hansen on 11/22/2023. Patient reports that he is now experiencing pain.    Philippe Mixon underwent a diagnostic and therapeutic left L3-L4 and left L4-L5 transforaminal epidural steroid injections  on 11/22/23. Patient reported 20% relief at the time of after procedure exam with Dr Hansen. In addition, patient experienced significant functional improvement (perforing activities without experiencing pain compared with examoination prior to procedure that produced these activities.)    Pain level before procedure: 8/10  Pain level after procedure: 8/10    Today, the patient reports 0% pain relief (pain level 10/10.)    Patient denies side effects or complications.  Patient does not have any questions or concerns at this time.    Patient stated he received 20% with a (pain level of 8/10 ) relief after his procedure that lasted  5-6 months and gradually went up to a 10/10.

## 2024-11-05 ENCOUNTER — TELEPHONE (OUTPATIENT)
Dept: PAIN MEDICINE | Facility: CLINIC | Age: 64
End: 2024-11-05
Payer: MEDICARE

## 2024-11-05 NOTE — TELEPHONE ENCOUNTER
Caller: Philippe Mixon Chadwick    Relationship: Self    Best call back number: [phone]    What is the best time to reach you: ANYTIME    Who are you requesting to speak with (clinical staff, provider,  specific staff member):       What was the call regarding: PT IS SCHEDULED FOR SURGERY ON 11.6.24 AND WOULD LIKE TO KNOW WHAT TIME IS SUPPOSED TO ARRIVE. MENTIONED THE 10:15 AM IN THE CHART- BUT THAT MAY NOT BE THE ARRIVAL TIME    PT HAS TO DRIVE 2 HOURS TO GET TO THE LOCATION- PLEASE ADVISE OF THE LOCATION- HE STATES THAT IT IS AT THE HOSPITAL ON [facility].    PLEASE CONFIRM LOCATION AND ARRIVAL TIME    TRIED TO WT- NO ANSWER

## 2024-11-06 ENCOUNTER — OUTSIDE FACILITY SERVICE (OUTPATIENT)
Dept: PAIN MEDICINE | Facility: CLINIC | Age: 64
End: 2024-11-06
Payer: MEDICARE

## 2024-11-06 PROCEDURE — 64483 NJX AA&/STRD TFRM EPI L/S 1: CPT | Performed by: ANESTHESIOLOGY

## 2024-11-08 ENCOUNTER — TELEPHONE (OUTPATIENT)
Dept: PAIN MEDICINE | Facility: CLINIC | Age: 64
End: 2024-11-08
Payer: MEDICARE

## 2024-11-08 NOTE — TELEPHONE ENCOUNTER
I spoke with the patient regarding how he is feeling after his procedure with Dr Hansen on 11/6/24. Patient reports that he is doing well. Patient also stated he would like to move forward with a neck injection.     Philippe Mixon underwent a TFESI on 11/6/24. Patient reported 100% relief at the time of after procedure exam with Dr Hansen. In addition, patient experienced significant functional improvement (perforing activities without experiencing pain compared with examoination prior to procedure that produced these activities.)    Pain level before procedure: 10/10  Pain level after procedure: 0/10    Today, the patient reports 70% pain relief (pain level 3/10.)    Patient denies side effects or complications.  Patient does not have any questions or concerns at this time.

## 2024-11-12 ENCOUNTER — TELEPHONE (OUTPATIENT)
Dept: PAIN MEDICINE | Facility: CLINIC | Age: 64
End: 2024-11-12
Payer: MEDICARE

## 2024-11-12 NOTE — TELEPHONE ENCOUNTER
Called patient to get him scheduled for a procedure. No answer, left voicemail for patient to call back.

## 2024-11-14 NOTE — TELEPHONE ENCOUNTER
Caller: Philippe Mixon    Relationship: Self    Best call back number: [phone]    What is the best time to reach you:     Who are you requesting to speak with (clinical staff, provider,  specific staff member): CLINICAL STAFF    Do you know the name of the person who called:     What was the call regarding:  OUT-PATIENT PROCEDURE    Is it okay if the provider responds through MyChart: CALL

## 2024-11-18 DIAGNOSIS — M47.812 CERVICAL SPONDYLOSIS WITHOUT MYELOPATHY: Primary | ICD-10-CM

## 2024-12-06 ENCOUNTER — TELEPHONE (OUTPATIENT)
Dept: PAIN MEDICINE | Facility: CLINIC | Age: 64
End: 2024-12-06
Payer: MEDICARE

## 2024-12-06 NOTE — TELEPHONE ENCOUNTER
I spoke with the patient regarding how much relief he received from his procedure with Dr. Hansen on 10/16/2023. Philippe Mixon underwent a bilateral cervical medial branch rhizotomies at C4, C5, C6; for bilateral cervical facet joints at C4-C5, C5-C6 on 10/16/2023. Patient reports that he sustained 50% pain relief for a duration of 6 months. In addition, patient experienced significant functional improvement (perforing activities without experiencing pain compared with examoination prior to procedure that produced these activities.)

## 2024-12-11 ENCOUNTER — OFFICE VISIT (OUTPATIENT)
Dept: INTERNAL MEDICINE | Facility: CLINIC | Age: 64
End: 2024-12-11
Payer: MEDICARE

## 2024-12-11 ENCOUNTER — OFFICE VISIT (OUTPATIENT)
Dept: UROLOGY | Facility: CLINIC | Age: 64
End: 2024-12-11
Payer: MEDICARE

## 2024-12-11 VITALS
OXYGEN SATURATION: 97 % | WEIGHT: 184.2 LBS | TEMPERATURE: 98.2 F | HEART RATE: 55 BPM | SYSTOLIC BLOOD PRESSURE: 136 MMHG | HEIGHT: 70 IN | BODY MASS INDEX: 26.37 KG/M2 | DIASTOLIC BLOOD PRESSURE: 70 MMHG

## 2024-12-11 VITALS — WEIGHT: 185 LBS | BODY MASS INDEX: 26.48 KG/M2 | HEIGHT: 70 IN

## 2024-12-11 DIAGNOSIS — R05.1 ACUTE COUGH: Primary | ICD-10-CM

## 2024-12-11 DIAGNOSIS — K29.70 HELICOBACTER PYLORI GASTRITIS: ICD-10-CM

## 2024-12-11 DIAGNOSIS — B96.81 HELICOBACTER PYLORI GASTRITIS: ICD-10-CM

## 2024-12-11 DIAGNOSIS — R97.20 ELEVATED PROSTATE SPECIFIC ANTIGEN (PSA): Primary | ICD-10-CM

## 2024-12-11 LAB
EXPIRATION DATE: NORMAL
FLUAV AG UPPER RESP QL IA.RAPID: NOT DETECTED
FLUBV AG UPPER RESP QL IA.RAPID: NOT DETECTED
INTERNAL CONTROL: NORMAL
Lab: NORMAL
SARS-COV-2 AG UPPER RESP QL IA.RAPID: NOT DETECTED

## 2024-12-11 PROCEDURE — 1159F MED LIST DOCD IN RCRD: CPT | Performed by: INTERNAL MEDICINE

## 2024-12-11 PROCEDURE — 87428 SARSCOV & INF VIR A&B AG IA: CPT | Performed by: INTERNAL MEDICINE

## 2024-12-11 PROCEDURE — 99213 OFFICE O/P EST LOW 20 MIN: CPT | Performed by: INTERNAL MEDICINE

## 2024-12-11 PROCEDURE — 1160F RVW MEDS BY RX/DR IN RCRD: CPT | Performed by: INTERNAL MEDICINE

## 2024-12-11 PROCEDURE — 1126F AMNT PAIN NOTED NONE PRSNT: CPT | Performed by: INTERNAL MEDICINE

## 2024-12-11 PROCEDURE — G2211 COMPLEX E/M VISIT ADD ON: HCPCS | Performed by: INTERNAL MEDICINE

## 2024-12-11 NOTE — PROGRESS NOTES
Hpylori (Feeling better ) and Cough (For 1 week)    Subjective   Philippe Mixon is a 64 y.o. male is here today for follow-up.    Cough  Associated symptoms include headaches and myalgias. Pertinent negatives include no chest pain, chills, fever, rash or sore throat.     Here for a follow up on his h.pylori gastritiws. Sxs better. Finished the regimen.  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      Current Outpatient Medications:     azelastine (ASTELIN) 0.1 % nasal spray, Every 12 (Twelve) Hours., Disp: , Rfl:     B Complex Vitamins (vitamin b complex) capsule capsule, Take  by mouth Daily., Disp: , Rfl:     celecoxib (CeleBREX) 400 MG capsule, Take 1 capsule by mouth Daily., Disp: , Rfl:     Cholecalciferol 25 MCG (1000 UT) capsule, Take 1 capsule by mouth Daily., Disp: , Rfl:     folic acid (FOLVITE) 1 MG tablet, Take 2 tablets by mouth Daily., Disp: , Rfl:     levothyroxine (SYNTHROID, LEVOTHROID) 75 MCG tablet, Take 1 tablet by mouth Daily., Disp: 90 tablet, Rfl: 3    methotrexate 2.5 MG tablet, TAKE 8 TABLETS BY MOUTH  WEEKLY, Disp: 48 tablet, Rfl: 3    omeprazole (priLOSEC) 40 MG capsule, 1 PO BID x 2 weeks then go back to qday DX- H.Pylori, Disp: 28 capsule, Rfl: 0    ondansetron (Zofran) 4 MG tablet, Take 1 tablet by mouth Every 8 (Eight) Hours As Needed for Nausea or Vomiting., Disp: 30 tablet, Rfl: 3    sildenafil (VIAGRA) 100 MG tablet, Take 1 tablet by mouth Daily As Needed for Erectile Dysfunction., Disp: 30 tablet, Rfl: 3      The following portions of the patient's history were reviewed and updated as appropriate: allergies, current medications, past family history, past medical history, past social history, past surgical history and problem list.    Review of Systems   Constitutional:  Negative for chills, diaphoresis, fatigue and fever.   HENT:  Negative for sore throat.    Respiratory:  Positive for cough.    Cardiovascular:  Negative for chest pain.   Gastrointestinal:   "Positive for nausea. Negative for abdominal pain and vomiting.   Genitourinary:  Negative for dysuria.   Musculoskeletal:  Positive for myalgias and neck pain.   Skin:  Negative for rash.   Neurological:  Positive for numbness and headaches. Negative for weakness.       Objective   /70   Pulse 55   Temp 98.2 °F (36.8 °C)   Ht 177 cm (69.69\")   Wt 83.6 kg (184 lb 3.2 oz)   SpO2 97% Comment: ra  BMI 26.67 kg/m²   Physical Exam  Vitals and nursing note reviewed.   Constitutional:       Appearance: He is well-developed.   HENT:      Head: Normocephalic and atraumatic.      Right Ear: External ear normal. Tympanic membrane is bulging.      Left Ear: External ear normal. Tympanic membrane is bulging.      Mouth/Throat:      Pharynx: Posterior oropharyngeal erythema present. No oropharyngeal exudate.      Tonsils: No tonsillar abscesses.   Eyes:      Conjunctiva/sclera: Conjunctivae normal.      Pupils: Pupils are equal, round, and reactive to light.   Neck:      Thyroid: No thyromegaly.   Cardiovascular:      Rate and Rhythm: Normal rate and regular rhythm.      Pulses: Normal pulses.      Heart sounds: Normal heart sounds. No murmur heard.     No friction rub. No gallop.   Pulmonary:      Effort: Pulmonary effort is normal.      Breath sounds: Normal breath sounds. No stridor.   Abdominal:      General: Bowel sounds are normal. There is no distension.      Palpations: Abdomen is soft.      Tenderness: There is no abdominal tenderness.   Musculoskeletal:      Cervical back: Normal range of motion and neck supple.   Lymphadenopathy:      Cervical: No cervical adenopathy.   Skin:     General: Skin is warm and dry.   Neurological:      Mental Status: He is alert and oriented to person, place, and time.      Cranial Nerves: No cranial nerve deficit.   Psychiatric:         Judgment: Judgment normal.         BMI is >= 25 and <30. (Overweight) The following options were offered after discussion;: exercise " counseling/recommendations and nutrition counseling/recommendations       Results for orders placed or performed in visit on 12/11/24   POCT SARS-CoV-2 Antigen RASHARD + Flu    Collection Time: 12/11/24  1:28 PM    Specimen: Swab   Result Value Ref Range    SARS Antigen Not Detected Not Detected, Presumptive Negative    Influenza A Antigen RASHARD Not Detected Not Detected    Influenza B Antigen RASHARD Not Detected Not Detected    Internal Control Passed Passed    Lot Number [id number]     Expiration Date 10/23/25              Assessment & Plan   Diagnoses and all orders for this visit:    Acute cough  -     POCT SARS-CoV-2 Antigen RASHARD + Flu    Helicobacter pylori gastritis  -     Ambulatory referral for Screening EGD    Continue omeprazole 40 mg daily.  Counseled the importance EGD to document eradication of H. pylori.    Guarding the cough, advised to take the medication and Mucinex and call if getting worse.         Return for Next scheduled follow up.    Electronically signed by:    Stephanie Livingston MD   Answers submitted by the patient for this visit:  Primary Reason for Visit (Submitted on 12/9/2024)  What is the primary reason for your visit?: Problem Not Listed

## 2024-12-11 NOTE — PROGRESS NOTES
Elevated PSA Office Visit      Patient Name: Philippe Mixon  : 1960   MRN: [id number]     Chief Complaint:  Elevated PSA.    Chief Complaint   Patient presents with    Elevated PSA       Referring Provider: Stephanie Livingston MD    History of Present Illness: Philippe Mixon is a 64 y.o. male who presents today with history of elevated PSA. Most recent PSA 5.12 (10/16/24), elevated from 3.8 (23).     He undergoes routine PSA testing with his PCP.  He has some mild baseline LUTS that is not bothersome.  This has not changed.  Denies any dysuria or hematuria or pelvic pain around the time of PSA.  He has never seen a urologist prior.  Father had prostate cancer and received XRT.  No other family history of , breast, GYN, or colorectal malignancy.    He has had MRI in past. Denies any metal, stents, or clips in body.    Subjective      Review of System: Review of Systems   Genitourinary:  Negative for decreased urine volume, difficulty urinating, dysuria, enuresis, flank pain, frequency, hematuria and urgency.      I have reviewed the ROS documented by my clinical staff, updated as appropriate and I agree. Ismael Lund MD    Past Medical History:   Past Medical History:   Diagnosis Date    Abdominal tenderness, epigastric     Arthritis     Elevated PSA     Erectile dysfunction 6    Finger injury     Headache     Hypothyroidism     Low back pain Unknown    Rheumatoid arthritis     Visual impairment        Past Surgical History:   Past Surgical History:   Procedure Laterality Date    COLONOSCOPY  2023    EYE SURGERY      Repair    EYE SURGERY Left     Cataract Removal    KNEE SURGERY Right 2021    right knee arthroscopy with partial medial meniscectomy Dr. Horan [facility]    RETINAL DETACHMENT REPAIR Left     ROTATOR CUFF REPAIR Left     yomaira leonid    ROTATOR CUFF REPAIR Right     Yomaira Leonid    SHOULDER ARTHROSCOPY      VASECTOMY  1986       Family History:    Family History   Problem Relation Age of Onset    Arthritis Other     Cancer Other     Diabetes Other     Hypertension Other     Thyroid disease Other     Arthritis Mother     Diabetes Mother     Hyperlipidemia Mother     Thyroid disease Mother     Cancer Father     Prostate cancer Father     Cancer Sister     COPD Sister     Cancer Brother     Cancer Daughter        Social History:   Social History     Socioeconomic History    Marital status:    Tobacco Use    Smoking status: Former     Current packs/day: 0.00     Average packs/day: 1.5 packs/day for 15.0 years (22.5 ttl pk-yrs)     Types: Cigarettes     Start date: 1975     Quit date: 1990     Years since quittin.1     Passive exposure: Past    Smokeless tobacco: Never   Vaping Use    Vaping status: Never Used   Substance and Sexual Activity    Alcohol use: No    Drug use: Never    Sexual activity: Yes     Partners: Female     Birth control/protection: Vasectomy, Surgical       Medications:     Current Outpatient Medications:     azelastine (ASTELIN) 0.1 % nasal spray, Every 12 (Twelve) Hours., Disp: , Rfl:     B Complex Vitamins (vitamin b complex) capsule capsule, Take  by mouth Daily., Disp: , Rfl:     celecoxib (CeleBREX) 400 MG capsule, Take 1 capsule by mouth Daily., Disp: , Rfl:     Cholecalciferol 25 MCG (1000 UT) capsule, Take 1 capsule by mouth Daily., Disp: , Rfl:     clarithromycin (BIAXIN) 500 MG tablet, Take 1 tablet by mouth 2 (Two) Times a Day. DX- H.Pylori, Disp: 28 tablet, Rfl: 0    folic acid (FOLVITE) 1 MG tablet, Take 2 tablets by mouth Daily., Disp: , Rfl:     levothyroxine (SYNTHROID, LEVOTHROID) 75 MCG tablet, Take 1 tablet by mouth Daily., Disp: 90 tablet, Rfl: 3    methotrexate 2.5 MG tablet, TAKE 8 TABLETS BY MOUTH  WEEKLY, Disp: 48 tablet, Rfl: 3    ondansetron (Zofran) 4 MG tablet, Take 1 tablet by mouth Every 8 (Eight) Hours As Needed for Nausea or Vomiting., Disp: 30 tablet, Rfl: 3    sildenafil (VIAGRA) 100  "MG tablet, Take 1 tablet by mouth Daily As Needed for Erectile Dysfunction., Disp: 30 tablet, Rfl: 3    amoxicillin (AMOXIL) 500 MG capsule, Take 2 capsules by mouth 2 (Two) Times a Day. DX- H.Pylori (Patient not taking: Reported on 12/11/2024), Disp: 56 capsule, Rfl: 0    omeprazole (priLOSEC) 40 MG capsule, 1 PO BID x 2 weeks then go back to qday DX- H.Pylori (Patient not taking: Reported on 12/11/2024), Disp: 28 capsule, Rfl: 0    Allergies:   No Known Allergies    IPSS Questionnaire (AUA-7):  Over the past month…    1)  Incomplete Emptying  How often have you had a sensation of not emptying your bladder?  2 - Less than half the time   2)  Frequency  How often have you had to urinate less than every two hours? 2 - Less than half the time   3)  Intermittency  How often have you found you stopped and started again several times when you urinated?  0 - Not at all   4) Urgency  How often have you found it difficult to postpone urination?  2 - Less than half the time   5) Weak Stream  How often have you had a weak urinary stream?  1 - Less than 1 time in 5   6) Straining  How often have you had to push or strain to begin urination?  2 - Less than half the time   7) Nocturia  How many times did you typically get up at night to urinate?  1 - 1 time   Total Score:  10       Quality of life due to urinary symptoms:  If you were to spend the rest of your life with your urinary condition the way it is now, how would you feel about that? 2-Mostly Satisfied   Urine Leakage (Incontinence) 0-No Leakage         Objective     Physical Exam:   Vital Signs:   Vitals:    12/11/24 1108   Weight: 83.9 kg (185 lb)   Height: 177 cm (69.69\")     Body mass index is 26.79 kg/m².     Physical Exam  GEN: NAD  HEENT: NCAT, EOMI  PULM: No respiratory distress  CV: Appears well perfused  ABD: Non-distended  : CARRIE smooth, non-tender prostate, R slightly larger than L but no obvious concerning findings  EXT: No obvious deformities  MSK: Normal " ROM BL UE  NEURO: No focal deficits, AOx3  PSYCH: Appropriate mood and affect      Labs:   Hematocrit (%)   Date Value   04/17/2024 46.7   04/14/2023 44.1   03/16/2022 47.3   09/13/2021 47.1   03/08/2021 46.7   09/16/2020 46.2   03/04/2020 44.8   02/22/2019 48.6       Lab Results   Component Value Date    PSA 5.120 (H) 10/16/2024    PSA 3.800 04/17/2024    PSA 3.130 04/14/2023       Images:   No Images in the past 120 days found..    Measures:   Tobacco:   Philippe Mixon  reports that he quit smoking about 34 years ago. His smoking use included cigarettes. He started smoking about 49 years ago. He has a 22.5 pack-year smoking history. He has been exposed to tobacco smoke. He has never used smokeless tobacco. I have educated him on the risk of diseases from using tobacco products.    Urine Incontinence: ( NOUI)  None     Assessment / Plan      Assessment:     Mr. Mixon is a 64 y.o. male with elevated PSA. Most recent PSA 5.12 (10/16/24), elevated from 3.8 (4/14/23). Father had prostate cancer and received XRT.      Diagnoses and all orders for this visit:    1. Elevated prostate specific antigen (PSA) (Primary)  -     PSA Total+% Free (Serial); Future       Patient Education:   Today I discussed with the patient the etiology and management of elevated PSA.  Discussed that PSA is a protein used as a marker for prostate cancer screening. We discussed in depth the role for prostate cancer screening.   We discussed that over 90% of prostate cancers are detected by screening.  We discussed that screening means a test performed on an asymptomatic patient to detect cancer at an earlier point in time.  We discussed the role of screening which includes both PSA and CARRIE.  We discussed the harms of prostate cancer screening including potential overdiagnosis and overtreatment.  Discussed the benefits of screening including diagnosis of cancer or lower staging grade.  Discussed that prostate imaging is not recommended for  prostate cancer screening.  Discussed that screening should be offered to him in of an appropriate age to have a life expectancy more than 10 years.  Discussed that PSA is not capable of diagnosing prostate cancer.  We discussed that a biopsy is indicated if PSA is elevated as a confirmation of cancer.  Discussed the decision to perform a biopsy is often based on many criteria not just a total PSA value.  Discussed that a single abnormal PSA should not prompt biopsy.  Abnormal PSA level should be verified after a few weeks.  We discussed the possible etiologies that can result in an elevated PSA test other test other than cancer.   I evaluated his PSA which was elevated at 5.12.  I discussed that at this time I would like to repeat his PSA to ensure its exact value.  If his PSA remains elevated we will further discuss obtaining an MRI and subsequent prostate biopsy.  Today we did discuss the procedural steps with regards to the risk and benefits of prostate biopsy.  Patient has family history of prostate cancer (father).     Follow Up:   Return in about 6 months (around 6/11/2025) for Recheck.    I spent approximately 45 minutes providing clinical care for this patient; including review of patient's chart and provider documentation, face to face time spent with patient in examination room (obtaining history, performing physical exam, discussing diagnosis and management options), placing orders, and completing patient documentation.     Chito Cerna MD  [facility] [place]

## 2024-12-12 PROBLEM — R97.20 ELEVATED PROSTATE SPECIFIC ANTIGEN (PSA): Status: ACTIVE | Noted: 2024-12-12

## 2024-12-18 ENCOUNTER — OUTSIDE FACILITY SERVICE (OUTPATIENT)
Dept: PAIN MEDICINE | Facility: CLINIC | Age: 64
End: 2024-12-18
Payer: MEDICARE

## 2024-12-18 PROCEDURE — 64634 DESTROY C/TH FACET JNT ADDL: CPT | Performed by: ANESTHESIOLOGY

## 2024-12-18 PROCEDURE — 64633 DESTROY CERV/THOR FACET JNT: CPT | Performed by: ANESTHESIOLOGY

## 2024-12-18 PROCEDURE — 99152 MOD SED SAME PHYS/QHP 5/>YRS: CPT | Performed by: ANESTHESIOLOGY

## 2024-12-19 ENCOUNTER — TELEPHONE (OUTPATIENT)
Dept: PAIN MEDICINE | Facility: CLINIC | Age: 64
End: 2024-12-19
Payer: MEDICARE

## 2024-12-19 NOTE — TELEPHONE ENCOUNTER
FOLLOW UP CALL AFTER PROCEDURE    I spoke with the patient regarding how he is feeling after his procedure with Dr Hansen on 12/18/24. Patient reports that he is doing well but feeling some numbness. Having a little pain and unsure if its the position he was lying in but is feel fine.     Philippe Mixon underwent a repeat bilateral cervical medial branch rhizotomies at C4, C5, C6; for bilateral cervical facet joints at C4-C5, C5-C6  on 12/18/24. Patient reported 60% relief at the time of after procedure exam with Dr Hansen. In addition, patient experienced significant functional improvement (perforing activities without experiencing pain compared with examoination prior to procedure that produced these activities.)    Pain level before procedure: 9/10  Pain level after procedure: 0/10    Today, the patient reports 60% pain relief (pain level 4/10.)    Patient denies side effects or complications.  Patient does not have any questions or concerns at this time.    Advised patient of follow up with MORGAN Huffman on 6/19/2025.

## 2024-12-26 ENCOUNTER — OUTSIDE FACILITY SERVICE (OUTPATIENT)
Dept: GASTROENTEROLOGY | Facility: CLINIC | Age: 64
End: 2024-12-26
Payer: MEDICARE

## 2025-02-07 DIAGNOSIS — B96.81 HELICOBACTER PYLORI GASTRITIS: ICD-10-CM

## 2025-02-07 DIAGNOSIS — K29.70 HELICOBACTER PYLORI GASTRITIS: ICD-10-CM

## 2025-02-07 RX ORDER — OMEPRAZOLE 40 MG/1
40 CAPSULE, DELAYED RELEASE ORAL DAILY
Qty: 30 CAPSULE | Refills: 0 | Status: SHIPPED | OUTPATIENT
Start: 2025-02-07

## 2025-02-24 ENCOUNTER — TELEPHONE (OUTPATIENT)
Dept: PAIN MEDICINE | Facility: CLINIC | Age: 65
End: 2025-02-24
Payer: MEDICARE

## 2025-02-24 DIAGNOSIS — M51.16 INTERVERTEBRAL DISC DISORDER WITH RADICULOPATHY OF LUMBAR REGION: Primary | ICD-10-CM

## 2025-02-24 NOTE — TELEPHONE ENCOUNTER
Called patient back to advise him that MORGAN Huffman has put in a order for a nerve study and that central scheduling will call and get him scheduled. Patient was understanding and had no further questions

## 2025-02-24 NOTE — TELEPHONE ENCOUNTER
Called patient back to get more information about the pain he is experiencing.    Patient stated the pain is in his lower back going down his right hip into his right leg. He described the pain as sharp, stabbing, throbbing, and burning. Stated the pain is constant and he unable to sleep. Laying down makes it worse, sitting up makes it better but he has to adjust himself. Patient has not had any recent falls or accidental movements and is not using a cane or walker.     He stated the last injection he had in his back only lasted for about 4-5 days.  Today, patient reports a pain level of 7/10 (stated plenty of times its been higher than seven)  Average pain intensity last week: 7/10    I advised patient once I speak with MORGAN Huffman I will give him a call back. Patient was understanding and had no further questions.

## 2025-02-24 NOTE — TELEPHONE ENCOUNTER
Provider: BRITNEY HOFFMAN    Caller: Philippe Mixon    Relationship to Patient: Self    Phone Number: [phone]    Reason for Call: PATIENT CALLED REQUESTING TO SPEAK TO OFFICE IN REGARDS TO HIS 06/19/25 APPOINTMENT WITH BRITNEY HOFFMAN. REQUESTING A CALL BACK. PLEASE ADVISE.

## 2025-03-04 ENCOUNTER — TELEPHONE (OUTPATIENT)
Dept: PAIN MEDICINE | Facility: CLINIC | Age: 65
End: 2025-03-04
Payer: MEDICARE

## 2025-03-04 NOTE — TELEPHONE ENCOUNTER
Hub staff attempted to follow warm transfer process and was unsuccessful     Caller: Philippe Mixon    Relationship to patient: Self    Best call back number: [phone]      Patient is needing: PATIENT CALLED IN REQUESTING TO SPEAK TO OFFICE. PATIENT STATES HE HAS NOT HEARD FROM THE OFFICE HE WAS REFERRED TO.

## 2025-03-04 NOTE — TELEPHONE ENCOUNTER
S/w patient and provided him with central scheduling phone number to schedule EMG. Patient was thankful for the call back.

## 2025-03-10 DIAGNOSIS — B96.81 HELICOBACTER PYLORI GASTRITIS: ICD-10-CM

## 2025-03-10 DIAGNOSIS — K29.70 HELICOBACTER PYLORI GASTRITIS: ICD-10-CM

## 2025-03-10 RX ORDER — OMEPRAZOLE 40 MG/1
40 CAPSULE, DELAYED RELEASE ORAL DAILY
Qty: 30 CAPSULE | Refills: 0 | Status: SHIPPED | OUTPATIENT
Start: 2025-03-10

## 2025-03-11 ENCOUNTER — TELEPHONE (OUTPATIENT)
Dept: PAIN MEDICINE | Facility: CLINIC | Age: 65
End: 2025-03-11
Payer: MEDICARE

## 2025-03-11 NOTE — TELEPHONE ENCOUNTER
Provider:  BRITNEY MORA    Caller: TALI HECK    Relationship to Patient: SELF    Pharmacy: [facility] [phone]    Phone Number: [phone]    Reason for Call: PATIENT HAS EMG ORDERED AND DOES NOT HAVE APPOINTMENT FOR THAT UNTIL 5/21/25. PATIENT SAYS HE IS HAVING SUCH BAD LEG PAIN AND DOESN'T THINK HE CAN WAIT THAT LONG. ASKING ABOUT THE PAIN BUT SAYS HE ISN'T ASKING FOR MEDICINE. ASKED TO SPEAK WITH BRITNEY HOFFMAN.    When was the patient last seen: 12/18/24

## 2025-03-18 NOTE — TELEPHONE ENCOUNTER
Caller: Philippe Mixon    Relationship: Self    Best call back number: [phone]    What is the best time to reach you: ANY     Who are you requesting to speak with (clinical staff, provider,  specific staff member): BRITNEY HOFFMAN     Do you know the name of the person who called: YES

## 2025-03-24 RX ORDER — CELECOXIB 400 MG/1
400 CAPSULE ORAL DAILY
Qty: 90 CAPSULE | OUTPATIENT
Start: 2025-03-24

## 2025-03-24 RX ORDER — FOLIC ACID 1 MG/1
2000 TABLET ORAL DAILY
Qty: 180 TABLET | OUTPATIENT
Start: 2025-03-24

## 2025-04-03 NOTE — TELEPHONE ENCOUNTER
S/w Dr. Stewart's office and got patient scheduled for 04/11/2025 at 01:30 PM.   S/w central scheduling cancelled 05/21/2025 EMG test with [facility].     Patient is aware of date, time and location of facility.     Faxing order to [phone] Closing TE.

## 2025-04-07 PROBLEM — Z79.1 LONG TERM (CURRENT) USE OF NON-STEROIDAL ANTI-INFLAMMATORIES (NSAID): Status: ACTIVE | Noted: 2025-04-07

## 2025-04-07 PROBLEM — Z79.899 HIGH RISK MEDICATION USE: Status: ACTIVE | Noted: 2025-04-07

## 2025-04-07 PROBLEM — M15.0 PRIMARY OSTEOARTHRITIS INVOLVING MULTIPLE JOINTS: Status: ACTIVE | Noted: 2025-04-07

## 2025-04-07 NOTE — ASSESSMENT & PLAN NOTE
* 9/16/20: RF 17.1 (<14), CBC normal, TSH normal, T4 normal, Direct JUAN positive, RNP 3.3 (<0.2), DS DNA normal, Smith normal, SCL 70 normal, SSA and SSB Normal, Chromatin normal, Centromere normal, Isabel 1 normal, ESR 1.0  * 11/2/2020: Rf IgG 54 (20), RF IgM 51 9250, JUAN + 1:80 midbody pattern  * Medications/treatments/interventions tried include: Tylenol, ibuprofen, Celebrex, Diclofenac, Flexeril, rotator cuff surgery, he has done some physical therapy, he has seen orthopaedic surgeons, prednisone, MTX/folic acid    1. Continue MTX/folic acid.   2. Refill medication today  3. Check labs  4. Today he rates his pain as 6/10 in severity. He has declined starting biologics numerous times.   5. Follow up in 4 months  6.  His prognosis is somewhat guarded. He has chronic pain   7. Continue/refill Celebrex PRN   8. We last saw him 4/2024 with last labs being 10/24.  Lab order given today.  9. Occasional 1st toe and 1st finger pain.  No redness or inflammation at the time of pain.

## 2025-04-07 NOTE — ASSESSMENT & PLAN NOTE
* Celebrex 200 mg PO BID PRN For joint pain relief       Do not take over-the counter anti-inflammatory medicines, such as ibuprofen or naproxen (Advil, Motrin, Aleve and others), as they may cause problems when combined with your prescription medications.  In general, low dose daily aspirin for the treatment or prevention of heart disease or stroke is safe to take.  Acetaminophen (Tylenol) is generally safe to take as directed for headaches, cramps or other aches and pains or fever reduction

## 2025-04-07 NOTE — ASSESSMENT & PLAN NOTE
* Medications/treatments/interventions tried include: Tylenol, ibuprofen, Celebrex, Diclofenac, Flexeril, rotator cuff surgery, he has done some physical therapy, he has seen orthopaedic surgeons, prednisone, MTX/folic acid     1.  He has seen orthopaedic surgeons   2. He has tried various NSAIDS. Currently on Celebrex PRN   3. Tylenol PRN is ok as directed.   4. He has had rotator cuff surgery on both shoulders.   5. He has done some physical therapy.   6. He has tried muscle relaxers PRN

## 2025-04-07 NOTE — ASSESSMENT & PLAN NOTE
* Methotrexate 20 mg PO once/week for RA   * Started 12/2020    1. CBC and CMP every 8-12 weeks to monitor for medication toxicity.   2. Take folate supplements daily.  3. No recent serious infections.  4. Refill today

## 2025-04-08 ENCOUNTER — OFFICE VISIT (OUTPATIENT)
Age: 65
End: 2025-04-08
Payer: MEDICARE

## 2025-04-08 ENCOUNTER — LAB (OUTPATIENT)
Facility: HOSPITAL | Age: 65
End: 2025-04-08
Payer: MEDICARE

## 2025-04-08 ENCOUNTER — TELEPHONE (OUTPATIENT)
Age: 65
End: 2025-04-08

## 2025-04-08 VITALS
HEIGHT: 70 IN | HEART RATE: 55 BPM | BODY MASS INDEX: 26.93 KG/M2 | DIASTOLIC BLOOD PRESSURE: 72 MMHG | WEIGHT: 188.1 LBS | SYSTOLIC BLOOD PRESSURE: 132 MMHG | TEMPERATURE: 97.4 F

## 2025-04-08 DIAGNOSIS — Z79.899 HIGH RISK MEDICATION USE: ICD-10-CM

## 2025-04-08 DIAGNOSIS — M05.9 RHEUMATOID ARTHRITIS WITH POSITIVE RHEUMATOID FACTOR, INVOLVING UNSPECIFIED SITE: ICD-10-CM

## 2025-04-08 DIAGNOSIS — M05.9 RHEUMATOID ARTHRITIS WITH POSITIVE RHEUMATOID FACTOR, INVOLVING UNSPECIFIED SITE: Primary | ICD-10-CM

## 2025-04-08 DIAGNOSIS — M15.0 PRIMARY OSTEOARTHRITIS INVOLVING MULTIPLE JOINTS: ICD-10-CM

## 2025-04-08 DIAGNOSIS — Z79.1 LONG TERM (CURRENT) USE OF NON-STEROIDAL ANTI-INFLAMMATORIES (NSAID): ICD-10-CM

## 2025-04-08 LAB
ALBUMIN SERPL-MCNC: 4.2 G/DL (ref 3.5–5.2)
ALBUMIN/GLOB SERPL: 1.4 G/DL
ALP SERPL-CCNC: 73 U/L (ref 39–117)
ALT SERPL W P-5'-P-CCNC: 14 U/L (ref 1–41)
ANION GAP SERPL CALCULATED.3IONS-SCNC: 8.1 MMOL/L (ref 5–15)
AST SERPL-CCNC: 23 U/L (ref 1–40)
BILIRUB SERPL-MCNC: 0.3 MG/DL (ref 0–1.2)
BUN SERPL-MCNC: 18 MG/DL (ref 8–23)
BUN/CREAT SERPL: 16.7 (ref 7–25)
CALCIUM SPEC-SCNC: 9.8 MG/DL (ref 8.6–10.5)
CHLORIDE SERPL-SCNC: 106 MMOL/L (ref 98–107)
CO2 SERPL-SCNC: 24.9 MMOL/L (ref 22–29)
CREAT SERPL-MCNC: 1.08 MG/DL (ref 0.76–1.27)
CRP SERPL-MCNC: <0.3 MG/DL (ref 0–0.5)
DEPRECATED RDW RBC AUTO: 44.7 FL (ref 37–54)
EGFRCR SERPLBLD CKD-EPI 2021: 77.1 ML/MIN/1.73
ERYTHROCYTE [DISTWIDTH] IN BLOOD BY AUTOMATED COUNT: 13.4 % (ref 12.3–15.4)
ERYTHROCYTE [SEDIMENTATION RATE] IN BLOOD: <1 MM/HR (ref 0–20)
GLOBULIN UR ELPH-MCNC: 2.9 GM/DL
GLUCOSE SERPL-MCNC: 93 MG/DL (ref 65–99)
HCT VFR BLD AUTO: 44.8 % (ref 37.5–51)
HGB BLD-MCNC: 15.1 G/DL (ref 13–17.7)
MCH RBC QN AUTO: 31.1 PG (ref 26.6–33)
MCHC RBC AUTO-ENTMCNC: 33.7 G/DL (ref 31.5–35.7)
MCV RBC AUTO: 92.2 FL (ref 79–97)
PLATELET # BLD AUTO: 253 10*3/MM3 (ref 140–450)
PMV BLD AUTO: 9.5 FL (ref 6–12)
POTASSIUM SERPL-SCNC: 5.1 MMOL/L (ref 3.5–5.2)
PROT SERPL-MCNC: 7.1 G/DL (ref 6–8.5)
RBC # BLD AUTO: 4.86 10*6/MM3 (ref 4.14–5.8)
SODIUM SERPL-SCNC: 139 MMOL/L (ref 136–145)
WBC NRBC COR # BLD AUTO: 8.66 10*3/MM3 (ref 3.4–10.8)

## 2025-04-08 PROCEDURE — 85027 COMPLETE CBC AUTOMATED: CPT

## 2025-04-08 PROCEDURE — G2211 COMPLEX E/M VISIT ADD ON: HCPCS | Performed by: NURSE PRACTITIONER

## 2025-04-08 PROCEDURE — 86140 C-REACTIVE PROTEIN: CPT

## 2025-04-08 PROCEDURE — 36415 COLL VENOUS BLD VENIPUNCTURE: CPT

## 2025-04-08 PROCEDURE — 99214 OFFICE O/P EST MOD 30 MIN: CPT | Performed by: NURSE PRACTITIONER

## 2025-04-08 PROCEDURE — 80053 COMPREHEN METABOLIC PANEL: CPT

## 2025-04-08 PROCEDURE — 85652 RBC SED RATE AUTOMATED: CPT

## 2025-04-08 RX ORDER — CELECOXIB 200 MG/1
200 CAPSULE ORAL 2 TIMES DAILY
Qty: 180 CAPSULE | Refills: 1 | Status: SHIPPED | OUTPATIENT
Start: 2025-04-08

## 2025-04-08 RX ORDER — METHOTREXATE 2.5 MG/1
20 TABLET ORAL WEEKLY
Qty: 76 TABLET | Refills: 1 | Status: SHIPPED | OUTPATIENT
Start: 2025-04-08

## 2025-04-08 RX ORDER — FOLIC ACID 1 MG/1
2000 TABLET ORAL DAILY
Qty: 90 TABLET | Refills: 1 | Status: SHIPPED | OUTPATIENT
Start: 2025-04-08

## 2025-04-08 NOTE — PROGRESS NOTES
Office Visit       Date: 04/08/2025   Patient Name: Philippe Mixon  MRN: [id number]  YOB: 1960    Referring Physician: No ref. provider found     Chief Complaint:   Chief Complaint   Patient presents with    Rheumatoid Arthritis    Osteoarthritis       History of Present Illness: Philippe Mixon is a 64 y.o. male who is here today with arthritis.  Today he reports feeling the same.  He rates his pain 5.5 out of 10, global 5.5 out of 10 and 1 out of morning stiffness.  He would like refill of all medications today.      Subjective   Review of Systems:  Positive for nausea, joint pain, back pain, joint swelling, muscle pain, neck pain, neck stiffness, headaches and memory problems.  All other review of symptoms are negative.    Past Medical History:   Past Medical History:   Diagnosis Date    Abdominal tenderness, epigastric     Arthritis     Elevated PSA 4/24    Erectile dysfunction 6    Finger injury     Headache     Hypothyroidism     Low back pain Unknown    Rheumatoid arthritis     Visual impairment        Past Surgical History:   Past Surgical History:   Procedure Laterality Date    COLONOSCOPY  2/24/2023    EYE SURGERY      Repair    EYE SURGERY Left 2018    Cataract Removal    KNEE SURGERY Right 12/03/2021    right knee arthroscopy with partial medial meniscectomy Dr. Horan [facility]    RETINAL DETACHMENT REPAIR Left 2016    ROTATOR CUFF REPAIR Left 2010    [facility]    ROTATOR CUFF REPAIR Right 2011    [facility] [place]    SHOULDER ARTHROSCOPY      VASECTOMY  1986       Family History:   Family History   Problem Relation Age of Onset    Arthritis Other     Cancer Other     Diabetes Other     Hypertension Other     Thyroid disease Other     Arthritis Mother     Diabetes Mother     Hyperlipidemia Mother     Thyroid disease Mother     Cancer Father     Prostate cancer Father     Cancer Sister     COPD Sister     Cancer Brother     Cancer Daughter        Social History:   Social  History     Socioeconomic History    Marital status:    Tobacco Use    Smoking status: Former     Current packs/day: 0.00     Average packs/day: 1.5 packs/day for 15.0 years (22.5 ttl pk-yrs)     Types: Cigarettes     Start date: 1975     Quit date: 1990     Years since quittin.4     Passive exposure: Past    Smokeless tobacco: Never   Vaping Use    Vaping status: Never Used   Substance and Sexual Activity    Alcohol use: No    Drug use: Never    Sexual activity: Yes     Partners: Female     Birth control/protection: Vasectomy, Surgical       Medications:   Current Outpatient Medications:     azelastine (ASTELIN) 0.1 % nasal spray, Every 12 (Twelve) Hours., Disp: , Rfl:     B Complex Vitamins (vitamin b complex) capsule capsule, Take  by mouth Daily., Disp: , Rfl:     Cholecalciferol 25 MCG (1000 UT) capsule, Take 1 capsule by mouth Daily., Disp: , Rfl:     levothyroxine (SYNTHROID, LEVOTHROID) 75 MCG tablet, Take 1 tablet by mouth Daily., Disp: 90 tablet, Rfl: 3    omeprazole (priLOSEC) 40 MG capsule, TAKE 1 CAPSULE BY MOUTH DAILY, Disp: 30 capsule, Rfl: 0    ondansetron (Zofran) 4 MG tablet, Take 1 tablet by mouth Every 8 (Eight) Hours As Needed for Nausea or Vomiting., Disp: 30 tablet, Rfl: 3    sildenafil (VIAGRA) 100 MG tablet, Take 1 tablet by mouth Daily As Needed for Erectile Dysfunction., Disp: 30 tablet, Rfl: 3    celecoxib (CeleBREX) 200 MG capsule, Take 1 capsule by mouth 2 (Two) Times a Day., Disp: 180 capsule, Rfl: 1    folic acid (FOLVITE) 1 MG tablet, Take 2 tablets by mouth Daily., Disp: 90 tablet, Rfl: 1    methotrexate 2.5 MG tablet, Take 8 tablets by mouth 1 (One) Time Per Week., Disp: 76 tablet, Rfl: 1    Allergies: No Known Allergies    I have reviewed and updated the patient's chief complaint, history of present illness, review of systems, past medical history, surgical history, family history, social history, medications and allergy list as appropriate.     Objective   "  Vital Signs:   Vitals:    04/08/25 1312   BP: 132/72   BP Location: Left arm   Patient Position: Sitting   Cuff Size: Adult   Pulse: 55   Temp: 97.4 °F (36.3 °C)   Weight: 85.3 kg (188 lb 1.6 oz)   Height: 177 cm (69.69\")   PainSc: 6      Body mass index is 27.23 kg/m².           Physical Exam:  Physical Exam  Vitals reviewed.   Constitutional:       Appearance: Normal appearance.   HENT:      Head: Normocephalic and atraumatic.      Mouth/Throat:      Mouth: Mucous membranes are moist.   Eyes:      Conjunctiva/sclera: Conjunctivae normal.   Cardiovascular:      Rate and Rhythm: Normal rate and regular rhythm.      Pulses: Normal pulses.      Heart sounds: Normal heart sounds.   Pulmonary:      Effort: Pulmonary effort is normal.      Breath sounds: Normal breath sounds.   Musculoskeletal:         General: Normal range of motion.      Cervical back: Normal range of motion and neck supple.      Comments: OA changes bilateral hands  No synovitis  Both shoulders have had rotator cuff repair  Crepitus bilateral knees   Skin:     General: Skin is warm and dry.   Neurological:      General: No focal deficit present.      Mental Status: He is alert and oriented to person, place, and time. Mental status is at baseline.   Psychiatric:         Mood and Affect: Mood normal.         Behavior: Behavior normal.         Thought Content: Thought content normal.         Judgment: Judgment normal.          Results Review:   Imaging Results (Last 24 Hours)       ** No results found for the last 24 hours. **            Procedures    Assessment / Plan    Assessment/Plan:   Diagnoses and all orders for this visit:    1. Rheumatoid arthritis with positive rheumatoid factor, involving unspecified site (Primary)  Assessment & Plan:  * 9/16/20: RF 17.1 (<14), CBC normal, TSH normal, T4 normal, Direct JUAN positive, RNP 3.3 (<0.2), DS DNA normal, Smith normal, SCL 70 normal, SSA and SSB Normal, Chromatin normal, Centromere normal, Isabel 1 " normal, ESR 1.0  * 11/2/2020: Rf IgG 54 (20), RF IgM 51 9250, JUAN + 1:80 midbody pattern  * Medications/treatments/interventions tried include: Tylenol, ibuprofen, Celebrex, Diclofenac, Flexeril, rotator cuff surgery, he has done some physical therapy, he has seen orthopaedic surgeons, prednisone, MTX/folic acid    1. Continue MTX/folic acid.   2. Refill medication today  3. Check labs  4. Today he rates his pain as 6/10 in severity. He has declined starting biologics numerous times.   5. Follow up in 4 months  6.  His prognosis is somewhat guarded. He has chronic pain   7. Continue/refill Celebrex PRN   8. We last saw him 4/2024 with last labs being 10/24.  Lab order given today.  9. Occasional 1st toe and 1st finger pain.  No redness or inflammation at the time of pain.    Orders:  -     C-reactive Protein; Standing  -     Sedimentation Rate; Standing  -     Comprehensive Metabolic Panel; Standing  -     CBC (No Diff); Future    2. High risk medication use  Assessment & Plan:  * Methotrexate 20 mg PO once/week for RA   * Started 12/2020    1. CBC and CMP every 8-12 weeks to monitor for medication toxicity.   2. Take folate supplements daily.  3. No recent serious infections.  4. Refill today    Orders:  -     C-reactive Protein; Standing  -     Sedimentation Rate; Standing  -     Comprehensive Metabolic Panel; Standing  -     CBC (No Diff); Future    3. Primary osteoarthritis involving multiple joints  Assessment & Plan:  * Medications/treatments/interventions tried include: Tylenol, ibuprofen, Celebrex, Diclofenac, Flexeril, rotator cuff surgery, he has done some physical therapy, he has seen orthopaedic surgeons, prednisone, MTX/folic acid     1.  He has seen orthopaedic surgeons   2. He has tried various NSAIDS. Currently on Celebrex PRN   3. Tylenol PRN is ok as directed.   4. He has had rotator cuff surgery on both shoulders.   5. He has done some physical therapy.   6. He has tried muscle relaxers  PRN        4. Long term (current) use of non-steroidal anti-inflammatories (nsaid)  Assessment & Plan:  * Celebrex 200 mg PO BID PRN For joint pain relief       Do not take over-the counter anti-inflammatory medicines, such as ibuprofen or naproxen (Advil, Motrin, Aleve and others), as they may cause problems when combined with your prescription medications.  In general, low dose daily aspirin for the treatment or prevention of heart disease or stroke is safe to take.  Acetaminophen (Tylenol) is generally safe to take as directed for headaches, cramps or other aches and pains or fever reduction      Other orders  -     methotrexate 2.5 MG tablet; Take 8 tablets by mouth 1 (One) Time Per Week.  Dispense: 76 tablet; Refill: 1  -     folic acid (FOLVITE) 1 MG tablet; Take 2 tablets by mouth Daily.  Dispense: 90 tablet; Refill: 1  -     celecoxib (CeleBREX) 200 MG capsule; Take 1 capsule by mouth 2 (Two) Times a Day.  Dispense: 180 capsule; Refill: 1        Follow Up:   Return in about 4 months (around 8/8/2025) for Dr. Vega.        MORGAN Nuñez   [facility]

## 2025-04-23 ENCOUNTER — LAB (OUTPATIENT)
Dept: LAB | Facility: HOSPITAL | Age: 65
End: 2025-04-23
Payer: MEDICARE

## 2025-04-23 ENCOUNTER — OFFICE VISIT (OUTPATIENT)
Dept: INTERNAL MEDICINE | Facility: CLINIC | Age: 65
End: 2025-04-23
Payer: MEDICARE

## 2025-04-23 VITALS
SYSTOLIC BLOOD PRESSURE: 136 MMHG | HEIGHT: 70 IN | WEIGHT: 180.2 LBS | DIASTOLIC BLOOD PRESSURE: 64 MMHG | TEMPERATURE: 98.2 F | BODY MASS INDEX: 25.8 KG/M2 | HEART RATE: 83 BPM | OXYGEN SATURATION: 97 %

## 2025-04-23 DIAGNOSIS — E55.9 VITAMIN D DEFICIENCY: ICD-10-CM

## 2025-04-23 DIAGNOSIS — M25.50 ARTHRALGIA, UNSPECIFIED JOINT: ICD-10-CM

## 2025-04-23 DIAGNOSIS — M05.9 RHEUMATOID ARTHRITIS WITH POSITIVE RHEUMATOID FACTOR, INVOLVING UNSPECIFIED SITE: ICD-10-CM

## 2025-04-23 DIAGNOSIS — E03.9 ACQUIRED HYPOTHYROIDISM: ICD-10-CM

## 2025-04-23 DIAGNOSIS — K29.70 HELICOBACTER PYLORI GASTRITIS: ICD-10-CM

## 2025-04-23 DIAGNOSIS — E88.2 LIPOMATOSIS: ICD-10-CM

## 2025-04-23 DIAGNOSIS — R73.9 HYPERGLYCEMIA: ICD-10-CM

## 2025-04-23 DIAGNOSIS — B96.81 HELICOBACTER PYLORI GASTRITIS: ICD-10-CM

## 2025-04-23 DIAGNOSIS — K21.00 GASTROESOPHAGEAL REFLUX DISEASE WITH ESOPHAGITIS WITHOUT HEMORRHAGE: ICD-10-CM

## 2025-04-23 DIAGNOSIS — N52.9 ERECTILE DYSFUNCTION, UNSPECIFIED ERECTILE DYSFUNCTION TYPE: ICD-10-CM

## 2025-04-23 DIAGNOSIS — Z00.00 MEDICARE ANNUAL WELLNESS VISIT, SUBSEQUENT: Primary | ICD-10-CM

## 2025-04-23 LAB
25(OH)D3 SERPL-MCNC: 40.6 NG/ML (ref 30–100)
HBA1C MFR BLD: 5.3 % (ref 4.8–5.6)
T4 FREE SERPL-MCNC: 1.31 NG/DL (ref 0.92–1.68)
TESTOST SERPL-MCNC: 671 NG/DL (ref 193–740)
TSH SERPL DL<=0.05 MIU/L-ACNC: 2.56 UIU/ML (ref 0.27–4.2)
VIT B12 BLD-MCNC: 634 PG/ML (ref 211–946)

## 2025-04-23 PROCEDURE — 83036 HEMOGLOBIN GLYCOSYLATED A1C: CPT

## 2025-04-23 PROCEDURE — 84439 ASSAY OF FREE THYROXINE: CPT

## 2025-04-23 PROCEDURE — 82306 VITAMIN D 25 HYDROXY: CPT

## 2025-04-23 PROCEDURE — 82607 VITAMIN B-12: CPT

## 2025-04-23 PROCEDURE — 84443 ASSAY THYROID STIM HORMONE: CPT

## 2025-04-23 PROCEDURE — 84403 ASSAY OF TOTAL TESTOSTERONE: CPT

## 2025-04-23 RX ORDER — ONDANSETRON 4 MG/1
4 TABLET, FILM COATED ORAL EVERY 8 HOURS PRN
Qty: 30 TABLET | Refills: 3 | Status: SHIPPED | OUTPATIENT
Start: 2025-04-23

## 2025-04-23 RX ORDER — SILDENAFIL 100 MG/1
100 TABLET, FILM COATED ORAL DAILY PRN
Qty: 30 TABLET | Refills: 3 | Status: SHIPPED | OUTPATIENT
Start: 2025-04-23

## 2025-04-23 RX ORDER — LEVOTHYROXINE SODIUM 75 UG/1
75 TABLET ORAL DAILY
Qty: 90 TABLET | Refills: 3 | Status: SHIPPED | OUTPATIENT
Start: 2025-04-23

## 2025-04-23 RX ORDER — OMEPRAZOLE 40 MG/1
40 CAPSULE, DELAYED RELEASE ORAL DAILY
Qty: 30 CAPSULE | Refills: 0 | Status: SHIPPED | OUTPATIENT
Start: 2025-04-23

## 2025-04-23 RX ORDER — OMEPRAZOLE 40 MG/1
40 CAPSULE, DELAYED RELEASE ORAL DAILY
Qty: 90 CAPSULE | Refills: 3 | Status: CANCELLED | OUTPATIENT
Start: 2025-04-23

## 2025-04-23 NOTE — PROGRESS NOTES
Subjective   The ABCs of the Annual Wellness Visit  Medicare Wellness Visit      Philippe Mixon is a 65 y.o. patient who presents for a Medicare Wellness Visit.    The following portions of the patient's history were reviewed and   updated as appropriate: allergies, current medications, past family history, past medical history, past social history, past surgical history, and problem list.    Compared to one year ago, the patient's physical   health is the same.  Compared to one year ago, the patient's mental   health is the same.    Recent Hospitalizations:  He was not admitted to the hospital during the last year.     Current Medical Providers:  Patient Care Team:  Stephanie Livingston MD as PCP - General  Michelle Stahl APRN as Nurse Practitioner (Nurse Practitioner)    Outpatient Medications Prior to Visit   Medication Sig Dispense Refill    azelastine (ASTELIN) 0.1 % nasal spray Every 12 (Twelve) Hours.      B Complex Vitamins (vitamin b complex) capsule capsule Take  by mouth Daily.      celecoxib (CeleBREX) 200 MG capsule Take 1 capsule by mouth 2 (Two) Times a Day. 180 capsule 1    Cholecalciferol 25 MCG (1000 UT) capsule Take 1 capsule by mouth Daily.      folic acid (FOLVITE) 1 MG tablet Take 2 tablets by mouth Daily. 90 tablet 1    levothyroxine (SYNTHROID, LEVOTHROID) 75 MCG tablet Take 1 tablet by mouth Daily. 90 tablet 3    methotrexate 2.5 MG tablet Take 8 tablets by mouth 1 (One) Time Per Week. 76 tablet 1    omeprazole (priLOSEC) 40 MG capsule TAKE 1 CAPSULE BY MOUTH DAILY 30 capsule 0    ondansetron (Zofran) 4 MG tablet Take 1 tablet by mouth Every 8 (Eight) Hours As Needed for Nausea or Vomiting. 30 tablet 3    sildenafil (VIAGRA) 100 MG tablet Take 1 tablet by mouth Daily As Needed for Erectile Dysfunction. 30 tablet 3     No facility-administered medications prior to visit.     No opioid medication identified on active medication list. I have reviewed chart for other potential  high risk  "medication/s and harmful drug interactions in the elderly.      Aspirin is not on active medication list.  Aspirin use is not indicated based on review of current medical condition/s. Risk of harm outweighs potential benefits.  .    Patient Active Problem List   Diagnosis    Esophageal reflux    Hyperglycemia    Hypothyroidism    Vitamin D deficiency    Duodenitis    Dyslipidemia    Family history of prostate cancer in father    Cervical spondylosis without myelopathy    DDD (degenerative disc disease), cervical    Rheumatoid arthritis with positive rheumatoid factor    Myofascial pain    Occipital headache    Insomnia due to medical condition    Cervicothoracic interspinous bursitis    Deviated nasal septum    Epigastric abdominal tenderness    Finger injury    Impacted cerumen    Retinal defect    Elevated prostate specific antigen (PSA)    High risk medication use    Primary osteoarthritis involving multiple joints    Long term (current) use of non-steroidal anti-inflammatories (nsaid)     Advance Care Planning Advance Directive is not on file.  ACP discussion was held with the patient during this visit. Patient has an advance directive (not in EMR), copy requested.            Objective   Vitals:    04/23/25 0724   BP: 136/64   Pulse: 83   Temp: 98.2 °F (36.8 °C)   SpO2: 97%  Comment: ra   Weight: 81.7 kg (180 lb 3.2 oz)   Height: 177 cm (69.69\")   PainSc: 6    PainLoc: Generalized       Estimated body mass index is 26.09 kg/m² as calculated from the following:    Height as of this encounter: 177 cm (69.69\").    Weight as of this encounter: 81.7 kg (180 lb 3.2 oz).                Does the patient have evidence of cognitive impairment? No  Lab Results   Component Value Date    HGBA1C 5.30 04/23/2025                                                                                                Health  Risk Assessment    Smoking Status:  Social History     Tobacco Use   Smoking Status Former    Current packs/day: " 0.00    Average packs/day: 1.5 packs/day for 15.0 years (22.5 ttl pk-yrs)    Types: Cigarettes    Start date: 1975    Quit date: 1990    Years since quittin.5    Passive exposure: Past   Smokeless Tobacco Never     Alcohol Consumption:  Social History     Substance and Sexual Activity   Alcohol Use No       Fall Risk Screen  KASEYADI Fall Risk Assessment was completed, and patient is at LOW risk for falls.Assessment completed on:2025    Depression Screening   Little interest or pleasure in doing things? Not at all   Feeling down, depressed, or hopeless? Not at all   PHQ-2 Total Score 0      Health Habits and Functional and Cognitive Screenin/23/2025     7:29 AM   Functional & Cognitive Status   Do you have difficulty preparing food and eating? No   Do you have difficulty bathing yourself, getting dressed or grooming yourself? No   Do you have difficulty using the toilet? No   Do you have difficulty moving around from place to place? No   Do you have trouble with steps or getting out of a bed or a chair? Yes   Current Diet Well Balanced Diet   Dental Exam Up to date   Eye Exam Not up to date   Exercise (times per week) 4 times per week   Current Exercises Include Walking   Do you need help using the phone?  No   Are you deaf or do you have serious difficulty hearing?  No   Do you need help to go to places out of walking distance? No   Do you need help shopping? No   Do you need help preparing meals?  No   Do you need help with housework?  No   Do you need help with laundry? No   Do you need help taking your medications? No   Do you need help managing money? No   Do you ever drive or ride in a car without wearing a seat belt? No   Have you felt unusual stress, anger or loneliness in the last month? No   Who do you live with? Spouse   If you need help, do you have trouble finding someone available to you? No   Have you been bothered in the last four weeks by sexual problems? No   Do you have  difficulty concentrating, remembering or making decisions? Yes           Age-appropriate Screening Schedule:  Refer to the list below for future screening recommendations based on patient's age, sex and/or medical conditions. Orders for these recommended tests are listed in the plan section. The patient has been provided with a written plan.    Health Maintenance List  Health Maintenance   Topic Date Due    ZOSTER VACCINE (1 of 2) Never done    COVID-19 Vaccine (4 - 2024-25 season) 05/07/2025 (Originally 9/1/2024)    INFLUENZA VACCINE  07/01/2025    TDAP/TD VACCINES (3 - Td or Tdap) 09/14/2025    ANNUAL WELLNESS VISIT  04/23/2026    COLORECTAL CANCER SCREENING  02/27/2028    HEPATITIS C SCREENING  Completed    Pneumococcal Vaccine 50+  Completed                                                                                                                                                CMS Preventative Services Quick Reference  Risk Factors Identified During Encounter  Chronic Pain: Pain Management Referral Ordered  Immunizations Discussed/Encouraged: Shingrix    The above risks/problems have been discussed with the patient.  Pertinent information has been shared with the patient in the After Visit Summary.  An After Visit Summary and PPPS were made available to the patient.    Follow Up:   Next Medicare Wellness visit to be scheduled in 1 year.         Additional E&M Note during same encounter follows:  Patient has additional, significant, and separately identifiable condition(s)/problem(s) that require work above and beyond the Medicare Wellness Visit     Chief Complaint  Medicare Wellness-subsequent    Subjective   HPI  Philippe is also being seen today for an annual adult preventative physical exam.     He is concerned about being on MTX and having to see Rheum appx 4 times a year.  He feels that the MTX is not helping, and on occasion still has flare ups of her pain. Also has nausea. Hits him suddenly, unsure if  "on an empty stomach or full stomach.  Also notes has not taken his celebrex daily, has not taken in the last week.  S/p EMG/NCV done by Dr. Stewart, and was told that he had a pinched nerve in his back.  Rt leg and hip are also hurting.        Review of Systems   Constitutional:  Negative for chills and fever.   HENT:  Negative for congestion, ear pain and sore throat.    Eyes:  Negative for pain, redness and visual disturbance.   Respiratory:  Negative for cough and shortness of breath.    Cardiovascular:  Negative for chest pain, palpitations and leg swelling.   Gastrointestinal:  Negative for abdominal pain, diarrhea and nausea.   Endocrine: Negative for cold intolerance and heat intolerance.   Genitourinary:  Negative for flank pain and urgency.   Musculoskeletal:  Positive for arthralgias (rt hip, hands, and feet), back pain, gait problem and myalgias.   Skin:  Negative for pallor and rash.   Neurological:  Negative for dizziness and weakness.   Psychiatric/Behavioral:  Negative for dysphoric mood and sleep disturbance. The patient is not nervous/anxious.         Objective   Vital Signs:  /64   Pulse 83   Temp 98.2 °F (36.8 °C)   Ht 177 cm (69.69\")   Wt 81.7 kg (180 lb 3.2 oz)   SpO2 97% Comment: ra  BMI 26.09 kg/m²   Physical Exam  Constitutional:       General: He is not in acute distress.     Appearance: Normal appearance.   HENT:      Head: Normocephalic and atraumatic.      Right Ear: Tympanic membrane and external ear normal.      Left Ear: Tympanic membrane and external ear normal.      Nose: Nose normal.      Mouth/Throat:      Mouth: Mucous membranes are moist.   Eyes:      General: No scleral icterus.  Neck:      Vascular: No carotid bruit.   Cardiovascular:      Rate and Rhythm: Normal rate and regular rhythm.      Pulses: Normal pulses.      Heart sounds: Normal heart sounds. No murmur heard.     No friction rub. No gallop.   Pulmonary:      Effort: Pulmonary effort is normal.      Breath " "sounds: Normal breath sounds. No rhonchi or rales.   Chest:   Breasts:     Right: No tenderness.      Left: Mass present. No tenderness.          Comments: Multiple 1/2- 1\" sized lipomas over chest - left breast 3, 5 o clock.  Mid chest.  Abdominal:      General: Bowel sounds are normal. There is no distension.      Palpations: Abdomen is soft.      Tenderness: There is no right CVA tenderness, left CVA tenderness, guarding or rebound.      Hernia: No hernia is present.   Musculoskeletal:         General: No tenderness. Normal range of motion.      Cervical back: Normal range of motion.      Right lower leg: No edema.      Left lower leg: No edema.   Lymphadenopathy:      Cervical: No cervical adenopathy.      Upper Body:      Right upper body: No supraclavicular, axillary or pectoral adenopathy.      Left upper body: No supraclavicular, axillary or pectoral adenopathy.   Skin:     General: Skin is warm.      Findings: No rash.   Neurological:      General: No focal deficit present.      Mental Status: He is alert and oriented to person, place, and time. Mental status is at baseline.      Cranial Nerves: No cranial nerve deficit.      Sensory: No sensory deficit.      Coordination: Coordination normal.      Gait: Gait normal.      Deep Tendon Reflexes: Reflexes normal.      Comments: Balance wnl   Psychiatric:         Mood and Affect: Mood normal.         Behavior: Behavior normal.         The following data was reviewed by: Stephanie Livingston MD on 04/23/2025:           Assessment and Plan         Current Outpatient Medications:     azelastine (ASTELIN) 0.1 % nasal spray, Every 12 (Twelve) Hours., Disp: , Rfl:     B Complex Vitamins (vitamin b complex) capsule capsule, Take  by mouth Daily., Disp: , Rfl:     celecoxib (CeleBREX) 200 MG capsule, Take 1 capsule by mouth 2 (Two) Times a Day., Disp: 180 capsule, Rfl: 1    Cholecalciferol 25 MCG (1000 UT) capsule, Take 1 capsule by mouth Daily., Disp: , Rfl:     folic " "acid (FOLVITE) 1 MG tablet, Take 2 tablets by mouth Daily., Disp: 90 tablet, Rfl: 1    levothyroxine (SYNTHROID, LEVOTHROID) 75 MCG tablet, Take 1 tablet by mouth Daily., Disp: 90 tablet, Rfl: 3    omeprazole (priLOSEC) 40 MG capsule, Take 1 capsule by mouth Daily., Disp: 30 capsule, Rfl: 0    ondansetron (Zofran) 4 MG tablet, Take 1 tablet by mouth Every 8 (Eight) Hours As Needed for Nausea or Vomiting., Disp: 30 tablet, Rfl: 3    sildenafil (VIAGRA) 100 MG tablet, Take 1 tablet by mouth Daily As Needed for Erectile Dysfunction., Disp: 30 tablet, Rfl: 3    methotrexate 2.5 MG tablet, TAKE 8 TABLETS BY MOUTH ONCE WEEKLY, Disp: 96 tablet, Rfl: 1      The following portions of the patient's history were reviewed and updated as appropriate: allergies, current medications, past family history, past medical history, past social history, past surgical history and problem list.       Objective   /64   Pulse 83   Temp 98.2 °F (36.8 °C)   Ht 177 cm (69.69\")   Wt 81.7 kg (180 lb 3.2 oz)   SpO2 97% Comment: ra  BMI 26.09 kg/m²         Results for orders placed or performed in visit on 04/08/25   Comprehensive Metabolic Panel    Collection Time: 04/08/25  2:07 PM    Specimen: Blood   Result Value Ref Range    Glucose 93 65 - 99 mg/dL    BUN 18 8 - 23 mg/dL    Creatinine 1.08 0.76 - 1.27 mg/dL    Sodium 139 136 - 145 mmol/L    Potassium 5.1 3.5 - 5.2 mmol/L    Chloride 106 98 - 107 mmol/L    CO2 24.9 22.0 - 29.0 mmol/L    Calcium 9.8 8.6 - 10.5 mg/dL    Total Protein 7.1 6.0 - 8.5 g/dL    Albumin 4.2 3.5 - 5.2 g/dL    ALT (SGPT) 14 1 - 41 U/L    AST (SGOT) 23 1 - 40 U/L    Alkaline Phosphatase 73 39 - 117 U/L    Total Bilirubin 0.3 0.0 - 1.2 mg/dL    Globulin 2.9 gm/dL    A/G Ratio 1.4 g/dL    BUN/Creatinine Ratio 16.7 7.0 - 25.0    Anion Gap 8.1 5.0 - 15.0 mmol/L    eGFR 77.1 >60.0 mL/min/1.73   Sedimentation Rate    Collection Time: 04/08/25  2:07 PM    Specimen: Blood   Result Value Ref Range    Sed Rate <1 0 - 20 " mm/hr   C-reactive Protein    Collection Time: 04/08/25  2:07 PM    Specimen: Blood   Result Value Ref Range    C-Reactive Protein <0.30 0.00 - 0.50 mg/dL   CBC (No Diff)    Collection Time: 04/08/25  2:07 PM    Specimen: Blood   Result Value Ref Range    WBC 8.66 3.40 - 10.80 10*3/mm3    RBC 4.86 4.14 - 5.80 10*6/mm3    Hemoglobin 15.1 13.0 - 17.7 g/dL    Hematocrit 44.8 37.5 - 51.0 %    MCV 92.2 79.0 - 97.0 fL    MCH 31.1 26.6 - 33.0 pg    MCHC 33.7 31.5 - 35.7 g/dL    RDW 13.4 12.3 - 15.4 %    RDW-SD 44.7 37.0 - 54.0 fl    MPV 9.5 6.0 - 12.0 fL    Platelets 253 140 - 450 10*3/mm3             Assessment & Plan   Diagnoses and all orders for this visit:    Medicare annual wellness visit, subsequent    Vitamin D deficiency  -     Vitamin B12; Future  -     Vitamin D,25-Hydroxy; Future    Hyperglycemia  -     Hemoglobin A1c; Future    Acquired hypothyroidism  -     levothyroxine (SYNTHROID, LEVOTHROID) 75 MCG tablet; Take 1 tablet by mouth Daily.  -     TSH; Future  -     T4, Free; Future    Arthralgia, unspecified joint  -     Testosterone; Future    Lipomatosis  Comments:  chest and abd- monitor.    Erectile dysfunction, unspecified erectile dysfunction type  -     sildenafil (VIAGRA) 100 MG tablet; Take 1 tablet by mouth Daily As Needed for Erectile Dysfunction.    Gastroesophageal reflux disease with esophagitis without hemorrhage  -     ondansetron (Zofran) 4 MG tablet; Take 1 tablet by mouth Every 8 (Eight) Hours As Needed for Nausea or Vomiting.    Helicobacter pylori gastritis  -     omeprazole (priLOSEC) 40 MG capsule; Take 1 capsule by mouth Daily.    Rheumatoid arthritis with positive rheumatoid factor, involving unspecified site  Comments:  Followed by rheumatology and pain management.    Should reports that his chest and abdomen masses have not changed in several years which makes it consistent with lipomas in his breast.  Advised if he should notice any changes she would notify us ASAP so we can get a  mammogram.  Advised that men can get breast cancer as well.    He is concerned about his multiple labs done at rheumatology and here.  Advised we will try not to let it overlap.  He will would like all his labs to be done here instead.  Advised that he will need to follow-up with rheumatology as scheduled.           PHQ-2/PHQ-9 Depression screening reviewed with patient . Total time spent today for depression screening  with Philippe Mixon  was 15 minutes in counseling, along with plans for any diagnositc work-up and treatment.    Advice and education were given regarding cardiovascular risk reduction, healthy dietary habits, Seatbelt and helmet use and self skin examination.          Electronically signed by:    Stephanie Livingston MD        Additional age appropriate preventative wellness advice topics were discussed during today's preventative wellness exam(some topics already addressed during AWV portion of the note above):    Physical Activity: Advised cardiovascular activity 150 minutes per week as tolerated. (example brisk walk for 30 minutes, 5 days a week).     Nutrition: Discussed nutrition plan with patient. Information shared in after visit summary. Goal is for a well balanced diet to enhance overall health.     Healthy Weight: Discussed current and goal BMI with patient. Steps to attain this goal discussed. Information shared in after visit summary.     Motor Vehicle Safety Discussion:  Wearing Seatbelt While in Motor Vehicle recommendation. Adhering to posted speed limit recommendation.               Follow Up   Return in about 1 year (around 4/23/2026) for Medicare Wellness.  Patient was given instructions and counseling regarding his condition or for health maintenance advice. Please see specific information pulled into the AVS if appropriate.

## 2025-04-24 RX ORDER — METHOTREXATE 2.5 MG/1
20 TABLET ORAL WEEKLY
Qty: 96 TABLET | Refills: 1 | Status: SHIPPED | OUTPATIENT
Start: 2025-04-24

## 2025-05-21 DIAGNOSIS — B96.81 HELICOBACTER PYLORI GASTRITIS: ICD-10-CM

## 2025-05-21 DIAGNOSIS — K29.70 HELICOBACTER PYLORI GASTRITIS: ICD-10-CM

## 2025-05-21 RX ORDER — OMEPRAZOLE 40 MG/1
40 CAPSULE, DELAYED RELEASE ORAL DAILY
Qty: 90 CAPSULE | Refills: 2 | Status: SHIPPED | OUTPATIENT
Start: 2025-05-21

## 2025-06-05 ENCOUNTER — LAB (OUTPATIENT)
Facility: HOSPITAL | Age: 65
End: 2025-06-05
Payer: MEDICARE

## 2025-06-05 DIAGNOSIS — R97.20 ELEVATED PROSTATE SPECIFIC ANTIGEN (PSA): ICD-10-CM

## 2025-06-05 PROCEDURE — 36415 COLL VENOUS BLD VENIPUNCTURE: CPT

## 2025-06-05 PROCEDURE — 84154 ASSAY OF PSA FREE: CPT

## 2025-06-05 PROCEDURE — 84153 ASSAY OF PSA TOTAL: CPT

## 2025-06-09 LAB
PSA FREE MFR SERPL: 24.4 %
PSA FREE SERPL-MCNC: 0.88 NG/ML
PSA SERPL-MCNC: 3.6 NG/ML (ref 0–4)

## 2025-06-11 ENCOUNTER — OFFICE VISIT (OUTPATIENT)
Dept: UROLOGY | Facility: CLINIC | Age: 65
End: 2025-06-11
Payer: MEDICARE

## 2025-06-11 VITALS — BODY MASS INDEX: 25.77 KG/M2 | WEIGHT: 180 LBS | HEIGHT: 70 IN

## 2025-06-11 DIAGNOSIS — R97.20 ELEVATED PROSTATE SPECIFIC ANTIGEN (PSA): Primary | ICD-10-CM

## 2025-06-11 PROCEDURE — 99214 OFFICE O/P EST MOD 30 MIN: CPT | Performed by: UROLOGY

## 2025-06-11 PROCEDURE — 1159F MED LIST DOCD IN RCRD: CPT | Performed by: UROLOGY

## 2025-06-11 PROCEDURE — 1160F RVW MEDS BY RX/DR IN RCRD: CPT | Performed by: UROLOGY

## 2025-06-11 NOTE — PROGRESS NOTES
Elevated PSA Office Visit      Patient Name: Philippe Mixon  : 1960   MRN: [id number]     Chief Complaint:  Elevated PSA.    Chief Complaint   Patient presents with    Elevated PSA       History of Present Illness: Philippe Mixon is a 65 y.o. male who presents today with history of elevated PSA.  Patient returns today for 6-month follow-up with recheck PSA after elevated value of 5.1 and 10/2024.  Current PSA value has decreased to 3.6.  This is in line with his prior stable and normal trend.  No additional urinary complaints today    Subjective      Review of System: Review of Systems   Genitourinary:  Negative for decreased urine volume, difficulty urinating, dysuria, enuresis, flank pain, frequency, hematuria and urgency.      I have reviewed the ROS documented by my clinical staff, updated as appropriate and I agree. Chito Cerna MD    Past Medical History:   Past Medical History:   Diagnosis Date    Abdominal tenderness, epigastric     Arthritis     Cervical disc disorder Unknown    Elevated PSA     Erectile dysfunction 6    Finger injury     Headache     Hypothyroidism     Low back pain Unknown    Lumbosacral disc disease Unknown    Peripheral neuropathy Unknown    Rheumatoid arthritis     Visual impairment        Past Surgical History:   Past Surgical History:   Procedure Laterality Date    COLONOSCOPY  2023    EYE SURGERY      Repair    EYE SURGERY Left 2018    Cataract Removal    KNEE SURGERY Right 2021    right knee arthroscopy with partial medial meniscectomy Dr. [facility]    RETINAL DETACHMENT REPAIR Left     ROTATOR CUFF REPAIR Left     yomaira leonid    ROTATOR CUFF REPAIR Right     Yomaira Fraser    SHOULDER ARTHROSCOPY      TRIGGER POINT INJECTION  Unknown    VASECTOMY         Family History:   Family History   Problem Relation Age of Onset    Arthritis Other     Cancer Other     Diabetes Other     Hypertension Other     Thyroid disease Other      Arthritis Mother     Diabetes Mother     Hyperlipidemia Mother     Thyroid disease Mother     Cancer Father     Prostate cancer Father     Cancer Sister     COPD Sister     Cancer Brother     Cancer Daughter     Cancer Brother     Cancer Daughter        Social History:   Social History     Socioeconomic History    Marital status:    Tobacco Use    Smoking status: Former     Current packs/day: 0.00     Average packs/day: 1.5 packs/day for 15.0 years (22.5 ttl pk-yrs)     Types: Cigarettes     Start date: 1975     Quit date: 1990     Years since quittin.6     Passive exposure: Past    Smokeless tobacco: Never   Vaping Use    Vaping status: Never Used   Substance and Sexual Activity    Alcohol use: No    Drug use: Never    Sexual activity: Yes     Partners: Female     Birth control/protection: Vasectomy, Surgical       Medications:     Current Outpatient Medications:     azelastine (ASTELIN) 0.1 % nasal spray, Every 12 (Twelve) Hours., Disp: , Rfl:     B Complex Vitamins (vitamin b complex) capsule capsule, Take  by mouth Daily., Disp: , Rfl:     celecoxib (CeleBREX) 200 MG capsule, Take 1 capsule by mouth 2 (Two) Times a Day., Disp: 180 capsule, Rfl: 1    Cholecalciferol 25 MCG (1000 UT) capsule, Take 1 capsule by mouth Daily., Disp: , Rfl:     folic acid (FOLVITE) 1 MG tablet, Take 2 tablets by mouth Daily., Disp: 90 tablet, Rfl: 1    levothyroxine (SYNTHROID, LEVOTHROID) 75 MCG tablet, Take 1 tablet by mouth Daily., Disp: 90 tablet, Rfl: 3    methotrexate 2.5 MG tablet, TAKE 8 TABLETS BY MOUTH ONCE WEEKLY, Disp: 96 tablet, Rfl: 1    omeprazole (priLOSEC) 40 MG capsule, TAKE 1 CAPSULE BY MOUTH DAILY, Disp: 90 capsule, Rfl: 2    ondansetron (Zofran) 4 MG tablet, Take 1 tablet by mouth Every 8 (Eight) Hours As Needed for Nausea or Vomiting., Disp: 30 tablet, Rfl: 3    sildenafil (VIAGRA) 100 MG tablet, Take 1 tablet by mouth Daily As Needed for Erectile Dysfunction., Disp: 30 tablet, Rfl:  "3    Allergies:   No Known Allergies    IPSS Questionnaire (AUA-7):  Over the past month…    1)  Incomplete Emptying  How often have you had a sensation of not emptying your bladder?  1 - Less than 1 time in 5   2)  Frequency  How often have you had to urinate less than every two hours? 1 - Less than 1 time in 5   3)  Intermittency  How often have you found you stopped and started again several times when you urinated?  1 - Less than 1 time in 5   4) Urgency  How often have you found it difficult to postpone urination?  0 - Not at all   5) Weak Stream  How often have you had a weak urinary stream?  0 - Not at all   6) Straining  How often have you had to push or strain to begin urination?  1 - Less than 1 time in 5   7) Nocturia  How many times did you typically get up at night to urinate?  1 - 1 time   Total Score:  5       Quality of life due to urinary symptoms:  If you were to spend the rest of your life with your urinary condition the way it is now, how would you feel about that? 1-Pleased   Urine Leakage (Incontinence) 0-No Leakage         Objective     Physical Exam:   Vital Signs:   Vitals:    06/11/25 0859   Weight: 81.6 kg (180 lb)   Height: 177 cm (69.69\")     Body mass index is 26.06 kg/m².     Physical Exam  Vitals and nursing note reviewed.   Constitutional:       Appearance: Normal appearance.   HENT:      Head: Normocephalic and atraumatic.   Cardiovascular:      Comments: Well perfused  Pulmonary:      Effort: Pulmonary effort is normal.   Abdominal:      General: Abdomen is flat.      Palpations: Abdomen is soft.   Musculoskeletal:         General: Normal range of motion.   Skin:     General: Skin is warm and dry.   Neurological:      General: No focal deficit present.      Mental Status: He is alert and oriented to person, place, and time. Mental status is at baseline.   Psychiatric:         Mood and Affect: Mood normal.         Behavior: Behavior normal.         Thought Content: Thought content " normal.         Judgment: Judgment normal.           Labs:   Testosterone, Total (ng/dL)   Date Value   04/23/2025 671.00     Hematocrit (%)   Date Value   04/08/2025 44.8   04/17/2024 46.7   04/14/2023 44.1   03/16/2022 47.3   09/13/2021 47.1   03/08/2021 46.7   09/16/2020 46.2   03/04/2020 44.8       Lab Results   Component Value Date    PSA 3.6 06/05/2025    PSA 5.120 (H) 10/16/2024    PSA 3.800 04/17/2024       Images:   No Images in the past 120 days found..    Measures:   Tobacco:   Philippe Mixon  reports that he quit smoking about 34 years ago. His smoking use included cigarettes. He started smoking about 49 years ago. He has a 22.5 pack-year smoking history. He has been exposed to tobacco smoke. He has never used smokeless tobacco. I have educated him on the risk of diseases from using tobacco product    Assessment / Plan      Assessment:     Mr. Mixon is a 65 y.o. male with elevated PSA.  Current PSA 3.6 on 6-month recheck value has decreased from single elevated value in 10/2024 -this is in line with his prior stable trend.  Will continue to monitor.  Follow-up in 6 months for next recheck    Diagnoses and all orders for this visit:    1. Elevated prostate specific antigen (PSA) (Primary)  -     PSA Total+% Free (Serial); Future             Follow Up:   Return in about 6 months (around 12/11/2025) for Recheck.    I spent approximately 30 minutes providing clinical care for this patient; including review of patient's chart and provider documentation, face to face time spent with patient in examination room (obtaining history, performing physical exam, discussing diagnosis and management options), placing orders, and completing patient documentation.     Chito Cerna MD  [facility] [place]

## 2025-06-23 ENCOUNTER — OFFICE VISIT (OUTPATIENT)
Dept: PAIN MEDICINE | Facility: CLINIC | Age: 65
End: 2025-06-23
Payer: MEDICARE

## 2025-06-23 VITALS — WEIGHT: 178 LBS | HEIGHT: 70 IN | BODY MASS INDEX: 25.48 KG/M2

## 2025-06-23 DIAGNOSIS — M51.16 INTERVERTEBRAL DISC DISORDER WITH RADICULOPATHY OF LUMBAR REGION: ICD-10-CM

## 2025-06-23 DIAGNOSIS — M54.81 BILATERAL OCCIPITAL NEURALGIA: ICD-10-CM

## 2025-06-23 DIAGNOSIS — M79.18 MYOFASCIAL PAIN: ICD-10-CM

## 2025-06-23 DIAGNOSIS — M05.9 RHEUMATOID ARTHRITIS WITH POSITIVE RHEUMATOID FACTOR, INVOLVING UNSPECIFIED SITE: ICD-10-CM

## 2025-06-23 DIAGNOSIS — M50.30 DDD (DEGENERATIVE DISC DISEASE), CERVICAL: ICD-10-CM

## 2025-06-23 DIAGNOSIS — M54.16 LUMBAR RADICULOPATHY: ICD-10-CM

## 2025-06-23 DIAGNOSIS — M47.812 CERVICAL SPONDYLOSIS WITHOUT MYELOPATHY: ICD-10-CM

## 2025-06-23 PROCEDURE — 1160F RVW MEDS BY RX/DR IN RCRD: CPT | Performed by: NURSE PRACTITIONER

## 2025-06-23 PROCEDURE — 99214 OFFICE O/P EST MOD 30 MIN: CPT | Performed by: NURSE PRACTITIONER

## 2025-06-23 PROCEDURE — 1125F AMNT PAIN NOTED PAIN PRSNT: CPT | Performed by: NURSE PRACTITIONER

## 2025-06-23 PROCEDURE — 1159F MED LIST DOCD IN RCRD: CPT | Performed by: NURSE PRACTITIONER

## 2025-06-23 NOTE — PROGRESS NOTES
"Chief Complaint: \"Right extremity pain.\"       History of Present Illness:  Mr. Philippe Mixon, 65 y.o. male originally referred by Dr. Stephanie Livingston in consultation for chronic intractable neck pain and headaches. Patient reports a several year history of neck pain, which began without incident.  He also has a history of lower back difficulties.  MRI of the lumbar spine demonstrates diffuse facet hypertrophy with areas of disc bulge.  At L3-L4, there is facet hypertrophy with a disc bulge which creates mild central spinal stenosis and moderate left and mild right neuroforaminal stenosis.  At L4-L5 there is facet hypertrophy with disc bulging, which contributes to mild central spinal stenosis and severe bilateral neuroforaminal stenosis.  He was experiencing symptoms extending into his left lower extremity consistent with MRI findings.  Therefore on November 22, 2023 he underwent a diagnostic and therapeutic left L3-L4 and left L4-L5 transforaminal epidural steroid injection, from which he reports experiencing almost complete resolution of his left lower extremity pain.  He did continue with lower back difficulties, as well as numbness to his right thigh.  Additionally on October 16, 2023, he underwent bilateral cervical medial branch rhizotomies, from which overall he reported experiencing almost complete pain relief and functional improvement lasting 3 months, with an ongoing 50% lasting an additional 3 months, with gradual recurrence.  He called the office several months after I saw him in follow-up in 2024, reporting a recurrence of his neck and lower extremity pain.  He was reporting he began to experience bilateral lower extremity pain, versus prior was only the left.  Therefore on 11/6/2024, he underwent bilateral L4-L5 transforaminal epidural steroid injection, from which he reported experiencing 70% pain relief lasting about 3 months.  Additionally, on 12/18/2024 he underwent repeat bilateral cervical " medial branch rhizotomies at C4, C5 and C6, for which overall he reports experiencing about 60% pain relief and functional improvement lasting 6 months.  He did call the office there in February 2025, reporting an ongoing pain in his right lower extremity extending into his right foot.  I recommended an EMG to determine the source of his ongoing symptoms.  He underwent electrodiagnostic studies performed by Dr. Stewart on 4/11/2025, which demonstrates a mild right L5-S1 chronic radiculopathy.  He is here today for reevaluation of his neck pain, and lower back pain.      Problem #1: Neck Pain:  Pain Description: constant neck pain and occipital headaches with intermittent exacerbation, described as aching, stabbing, throbbing, and sharp sensation.   Radiation of Pain: The pain radiates into the occipital region causing headaches and into the bilateral shoulders.   Pain intensity today: 5/10  Average pain intensity last week: 5/10  Pain intensity ranges from: 5/10 to 9/10  Aggravating factors: Pain increases with extension, rotation of the cervical spine, flexion   Alleviating factors: Pain decreases with oral analgesics   Associated Symptoms:   Patient denies pain, numbness, or weakness in the upper extremities.  He does report some pain extending into his arms stopping above at the elbows, from which he relates to prior rotator cuff surgeries.  Patient denies any new bladder or bowel problems.   Patient reports difficulties with his balance but denies recent falls (related to his chronic back pain).   Patient reports bilateral cervicogenic and occipital headaches 3-4x a week lasting several hours.    Problem #1: Lower back and lower extremity pain:  Pain History: Patient reports a longstanding history of greater than 10 years of lower back pain.  He tells me he has previously seen Dr. Rodríguez in the past, and was found not to be a surgical candidate.    Pain Description: constant pain with intermittent exacerbation,  described as aching, numbing, sharp, and stabbing sensation.   Radiation of Pain: The pain radiates into the hips, and lateral aspect of his thighs and into the right calf RT>LT  Pain intensity today: 7/10  Average pain intensity last week: 7/10  Pain intensity ranges from: 5/10 to 9/10  Aggravating factors: Pain increases with driving, sitting, bending, twisting, standing, walking. Patient describes neurogenic claudication.    Alleviating factors: Pain decreases with sitting, lying down     Associated Symptoms:   Patient reports pain and numbness to the right lower extremity.   Patient denies any new bladder or bowel problems.   Patient reports difficulties with his balance but denies recent falls.   Pain interferes with regular activities, ADLs, and affects patient's quality of life  Pain interferes with general activities (ability to walk, stand, transition from different positions), perform activities of daily living  Pain interferes with sleep causing sleep fragmentation   Stiffness      Review of previous therapies and additional medical records:  Philippe Mixon has already failed the following measures, including:   Conservative Measures: Oral analgesics, topical analgesics, ice, heat, chiropractic therapy, physical therapy   Interventional Measures: Some injections with Dr. Batres more than 8 years ago  04/28/2021: DxTx cervical facet joint injections: bilateral C4-C5, bilateral C5-C6 combined with cervicothoracic interspinous bursa injection  10/27/2021: Bilateral cervical RFA at C4-C5 and C5-C6  10/16/2023: Bilateral cervical RFA at C4-C5 and C5-C6  11/22/2023: DxTx left L3-L4 and left L4-L5 transforaminal epidural steroid injection  11/06/2024: DxTx bilateral L4-L5 transforaminal epidural steroid injections  12/18/2024: Bilateral cervical RFA at C4-C5 and C5-C6  Surgical Measures: No history of cervical spine. No history of lumbar or hip surgery. 2011 Left Rotator cuff repair. 2010 Right Rotator cuff  repair.   Philippe Mixon has not undergone orthopedic spine or neurosurgical consultation for chronic pain condition   Philippe Mixon presents with significant comorbidities including RA, hypothyroidism  In terms of current analgesics, Philippe Mixon takes: Celebrex and methotrexate  I have reviewed [place] Report is consistent with medication reconciliation.  SOAPP: Low Risk    Global Pain Scale 04-01 2021 08-09 2021 09-19 2023 06-23 2025       Pain 14 12 15 17       Feelings 0 0 1 2       Clinical outcomes 10 9 4 7       Activities 14 11 3 1       GPS Total: 38 32 23 27           The [place] Back Pain Disability Scale  DATE 09-19 2023 06-18 2024                Sleep through the night 2  2               Turn over in bed 1  1               Get out of bed 1  1               Make your bed 1  0               Put on socks (pantyhose) 0  1               Ride in a car 2  2               Sit in a chair for several hours 2  3               Stand up for 20-30 minutes 1  2               Climb one flight of stairs 2  1               Walk a few blocks (200-300 yards)  2  1               Walk several miles 4  3               Run one block (about 50 yards) 2  3               Take food out of the refrigerator 1  1               Reach up to high shelves 1  1               Move a chair 0  1               Pull or push heavy doors 1  1               Bend over to clean the bathtub 2  2               Throw a ball 2  2               Carry two bags of groceries 1  1               Lift and carry a heavy suitcase 1  2               Total score 29  32                   NECK PAIN DISABILITY INDEX QUESTIONNAIRE  DATE 09-19 2023            Pain intensity  0: No pain  1: Mild  2: Moderate  3: Fairly severe  4: very severe  5: Worst imaginable 3           Personal Care   0: I can look after myself normally without causing extra pain.  1: I can look after myself normally, but it causes extra pain.  2: It is painful to look after myself  and I am slow and careful.  3: I need some help, but manage most of my personal care.  4: I need help every day in most aspects of self-care.  5: I do not get dressed; I wash with difficulty and stay in bed. 1            Lifting  0: I can lift heavy weights without extra pain.  1: I can lift heavy weights, but it gives extra pain.  2: Pain prevents me from lifting heavy weights off the floor but I can manage if they are conveniently positioned, for example, on a table.  3: Pain prevents me from lifting heavy weights, but I can manage light to medium weights if they are conveniently positioned.  4: I can lift very light weights.  5: I cannot lift or carry anything at all. 2            Reading  0: I can read as much as I want to with no pain in my neck.  1: I can read as much as I want to with slight pain in my neck.  2: I can read as much as I want to with moderate pain in my neck.  3: I cannot read as much as I want because of moderate pain in my neck.  4: I cannot read as much as I want because of severe pain in my neck.  5: I cannot read at all. 2            Headaches  0: I have no headaches at all.  1: I have slight headaches which come infrequently.  2: I have moderate headaches which come infrequently.  3: I have moderate headaches which come frequently.  4: I have severe headaches which come frequently.  5: I have headaches almost all the time. 3            Concentration  0: I can concentrate fully when I want to with no difficulty.  1: I can concentrate fully when I want to with slight difficulty.  2: I have a fair degree of difficulty in concentrating when I want to.  3: I have a lot of difficulty in concentrating when I want to.  4: I have a great deal of difficulty in concentrating when I want to.  5: I cannot concentrate at all. 1            Work  0: I can do as much work as I want to.  1: I can only do my usual work, but no more.  2: I can do most of my usual work, but no more.  3: I cannot do my usual  work.  4: I can hardly do any work at all.  5: I cannot do any work at all. 2            Driving  0: I can drive my car without any neck pain.  1: I can drive my car as long as I want with slight pain in my neck.  2: I can drive my car as long as I want with moderate pain in my   neck.  3: I cannot drive my car as long as I want because of moderate pain   in my neck.  4: I can hardly drive at all because of severe pain in my neck.  5: I cannot drive my car at all. 2            Sleeping  0: I have no trouble sleeping.  1: My sleep is slightly disturbed (less than 1 hour sleepless).  2: My sleep is mildly disturbed (1-2 hours sleepless).  3: My sleep is moderately disturbed (2-3 hours sleepless).  4: My sleep is greatly disturbed (3-5 hours sleepless).  5: My sleep is completely disturbed (5-7 hours) 1            Recreation  0: I am able to engage in all of my recreational activities with no neck pain at all.  1: I am able to engage in all of my recreational activities with some pain in my neck.  2: I am able to engage in most, but not all of my recreational activities because of pain in my neck.  3: I am able to engage in a few of my recreational activities because of pain in my neck.  4: I can hardly do any recreational activities because of pain in my neck.  5: I cannot do any recreational activities at all. 3            TOTAL SCORE 20             Review of New Diagnostic Studies:  EMG/NCV of the bilateral lower extremities 4/11/2025: Mild right L5-S1 radiculopathy    Review of Diagnostic Studies:  MRI of the lumbar spine without contrast 11/1/2023:  L1-L2: Facet hypertrophy with a mild disc bulge, no significant spinal canal neuroforaminal stenosis.  L2-L3: Facet hypertrophy with a mild disc bulge without significant spinal canal neuroforaminal stenosis.  L3-L4: Facet hypertrophy with a disc bulge, which creates mild central spinal stenosis with moderate left and mild right neuroforaminal stenosis.  L4-L5: Disc  bulge with facet hypertrophy with mild central spinal stenosis and severe bilateral neuroforaminal stenosis.  L5-S1: Disc bulge with facet hypertrophy without significant spinal canal neuroforaminal stenosis.  Bilateral hip x-rays 9/19/2023: Mild osteoarthritis of the hips as well as the sacroiliac joints  Lumbar spine x-rays with flexion-extension views 9/19/2023: No evidence of listhesis or instability.  Multilevel spondylosis with areas of disc space height loss and facet arthritis.  MRI of the cervical spine without contrast 4/9/2022: I have reviewed the imaging and the radiology report.  Vertebral body heights are well-maintained without evidence of malalignment.  Multilevel spondylosis.  C2-C3: Disc osteophyte complex and facet arthritis.  Mild right neuroforaminal stenosis.  C3-C4: Disc osteophyte complex with bilateral facet arthritis.  Mild central spinal canal stenosis and moderate to severe bilateral neuroforaminal stenosis, greater on the right.  C4-C5: Disc osteophyte complex and facet arthritis.  Moderate central spinal canal stenosis and bilateral neuroforaminal stenosis.  C5-C6: Disc osteophyte complex with facet arthritis.  Moderate central spinal canal stenosis and moderate right and severe left neuroforaminal stenosis.  C6-C7: Disc osteophyte complex with mild to moderate narrowing of the left spinal canal.  With mild bilateral neuroforaminal stenosis.      Review of Systems   Constitutional:  Positive for activity change.   Musculoskeletal:  Positive for arthralgias, back pain, joint swelling, myalgias, neck pain and neck stiffness.   Neurological:  Positive for headaches.   All other systems reviewed and are negative.        Patient Active Problem List   Diagnosis    Esophageal reflux    Hyperglycemia    Hypothyroidism    Vitamin D deficiency    Duodenitis    Dyslipidemia    Family history of prostate cancer in father    Cervical spondylosis without myelopathy    DDD (degenerative disc disease),  cervical    Rheumatoid arthritis with positive rheumatoid factor    Myofascial pain    Occipital headache    Insomnia due to medical condition    Cervicothoracic interspinous bursitis    Deviated nasal septum    Epigastric abdominal tenderness    Finger injury    Impacted cerumen    Retinal defect    Elevated prostate specific antigen (PSA)    High risk medication use    Primary osteoarthritis involving multiple joints    Long term (current) use of non-steroidal anti-inflammatories (nsaid)       Past Medical History:   Diagnosis Date    Abdominal tenderness, epigastric     Arthritis     Cervical disc disorder Unknown    Elevated PSA 4/24    Erectile dysfunction 6    Finger injury     Headache     Hypothyroidism     Low back pain Unknown    Lumbosacral disc disease Unknown    Peripheral neuropathy Unknown    Rheumatoid arthritis     Visual impairment          Past Surgical History:   Procedure Laterality Date    COLONOSCOPY  2/24/2023    EYE SURGERY      Repair    EYE SURGERY Left 2018    Cataract Removal    KNEE SURGERY Right 12/03/2021    right knee arthroscopy with partial medial meniscectomy Dr. [facility]an [facility]    RETINAL DETACHMENT REPAIR Left 2016    ROTATOR CUFF REPAIR Left 2010    yomaira leonid    ROTATOR CUFF REPAIR Right 2011    Yomaira Leonid    SHOULDER ARTHROSCOPY      TRIGGER POINT INJECTION  Unknown    VASECTOMY  1986         Family History   Problem Relation Age of Onset    Arthritis Other     Cancer Other     Diabetes Other     Hypertension Other     Thyroid disease Other     Arthritis Mother     Diabetes Mother     Hyperlipidemia Mother     Thyroid disease Mother     Cancer Father     Prostate cancer Father     Cancer Sister     COPD Sister     Cancer Brother     Cancer Daughter     Cancer Brother     Cancer Daughter          Social History     Socioeconomic History    Marital status:    Tobacco Use    Smoking status: Former     Current packs/day: 0.00     Average packs/day: 1.5 packs/day for 15.0  "years (22.5 ttl pk-yrs)     Types: Cigarettes     Start date: 1975     Quit date: 1990     Years since quittin.6     Passive exposure: Past    Smokeless tobacco: Never   Vaping Use    Vaping status: Never Used   Substance and Sexual Activity    Alcohol use: No    Drug use: Never    Sexual activity: Yes     Partners: Female     Birth control/protection: Vasectomy, Surgical           Current Outpatient Medications:     azelastine (ASTELIN) 0.1 % nasal spray, Every 12 (Twelve) Hours., Disp: , Rfl:     B Complex Vitamins (vitamin b complex) capsule capsule, Take  by mouth Daily., Disp: , Rfl:     celecoxib (CeleBREX) 200 MG capsule, Take 1 capsule by mouth 2 (Two) Times a Day., Disp: 180 capsule, Rfl: 1    Cholecalciferol 25 MCG (1000 UT) capsule, Take 1 capsule by mouth Daily., Disp: , Rfl:     folic acid (FOLVITE) 1 MG tablet, Take 2 tablets by mouth Daily., Disp: 90 tablet, Rfl: 1    levothyroxine (SYNTHROID, LEVOTHROID) 75 MCG tablet, Take 1 tablet by mouth Daily., Disp: 90 tablet, Rfl: 3    methotrexate 2.5 MG tablet, TAKE 8 TABLETS BY MOUTH ONCE WEEKLY, Disp: 96 tablet, Rfl: 1    omeprazole (priLOSEC) 40 MG capsule, TAKE 1 CAPSULE BY MOUTH DAILY, Disp: 90 capsule, Rfl: 2    ondansetron (Zofran) 4 MG tablet, Take 1 tablet by mouth Every 8 (Eight) Hours As Needed for Nausea or Vomiting., Disp: 30 tablet, Rfl: 3    sildenafil (VIAGRA) 100 MG tablet, Take 1 tablet by mouth Daily As Needed for Erectile Dysfunction., Disp: 30 tablet, Rfl: 3      No Known Allergies      Ht 177 cm (69.69\")   Wt 80.7 kg (178 lb)   BMI 25.77 kg/m²       Physical Exam:  Constitutional: Patient appears well-developed, well-nourished, well-hydrated  HEENT: Head: Normocephalic and atraumatic  Eyes: Conjunctivae and lids are normal  Pupils: Equal, round, reactive to light  Neck: Trachea normal. Neck supple. No JVD present.   Lymphatic: No cervical adenopathy  Pulmonary: No increased work of breathing or signs of respiratory " distress.   Musculoskeletal   Gait and station: Gait evaluation demonstrated a normal gait.    Cervical spine: Passive and active range of motion are somewhat limited with pain. Extension, lateral flexion, rotation of the cervical spine increased and reproduced pain. Cervical facet joint loading maneuvers are positive.  There is pain upon palpation of the cervicothoracic interspinous bursa today.  Muscles: Presence of active trigger points at the levator scapulae   Shoulders: The range of motion of the glenohumeral joints is slightly limited bilaterally. Rotator cuff strength is 5/5.   Lumbar spine: Passive and active range of motion are limited secondary to pain. Extension, lateral flexion, rotation of the lumbar spine increased and reproduced pain. Lumbar facet joint loading maneuvers are positive.  Kris test and Gaenslen's test are negative.   Piriformis maneuvers are negative.    Palpation of the bilateral ischial tuberosities, unrevealing.    Palpation of the bilateral greater trochanter, unrevealing.    Examination of the Iliotibial band: unrevealing.    Hip joints: The range of motion of the hip joints is somewhat limited to flexion and internal rotation on the bilaterally but without significant pain.  Neurological:   Patient is alert and oriented to person, place, and time.   Speech: Normal.   Cortical function: Normal mental status.   Cranial nerves 2-12: intact.   Reflex Scores:  Right brachioradialis: 2+  Left brachioradialis: 2+  Right biceps: 2+  Left biceps: 2+  Right triceps: 2+  Left triceps: 2+  Right patellar: 1+  Left patellar: 1+  Right Achilles: 1+  Left Achilles: 1+  Motor strength: 5/5  Motor Tone: Normal .   Involuntary movements: None.   Superficial/Primitive Reflexes: Primitive reflexes were absent.   Right Bai: Absent  Left Bai: Absent  Right ankle clonus: Absent  Left ankle clonus: Absent   Babinsky: Absent  Spurling sign: negative. Neck tornado test: Negative. Lhermitte sign:  Negative. Long tract signs: Negative. Straight leg raising test: Positive on the right. Femoral stretch sign: Negative.   Sensory exam: Intact to light touch, intact pain and temperature sensation, intact vibration sensation and normal proprioception.   Coordination: Normal finger to nose and heel to shin. Normal balance and negative Romberg's sign   Skin and subcutaneous tissue: Skin is warm and intact. No rash noted. No cyanosis.   Psychiatric: Judgment and insight: Normal. Orientation to person, place and time: Normal. Recent and remote memory: Intact. Mood and affect: Normal.              ASSESSMENT:   1. Intervertebral disc disorder with radiculopathy of lumbar region    2. Cervical spondylosis without myelopathy    3. Lumbar radiculopathy    4. Bilateral occipital neuralgia    5. Myofascial pain    6. DDD (degenerative disc disease), cervical    7. Rheumatoid arthritis with positive rheumatoid factor, involving unspecified site              PLAN/MEDICAL DECISION MAKING:  Mr. Philippe Mixon, 65 y.o. male reports a several year history of neck pain.  MRI of the cervical spine without contrast on 4/9/2022 revealed diffuse cervical spondylosis and multilevel degenerative disc disease, and areas of central spinal canal stenosis as well as bilateral neuroforaminal stenosis. Patient has been diagnosed with rheumatoid arthritis and is treated by Dr. Vega.  He continues to have elements of mechanical/axial neck pain, with occipital headaches.  He denies any obvious radicular complaints.  He also has lower back difficulties. MRI of the lumbar spine demonstrates diffuse facet hypertrophy with areas of disc bulge.  At L3-L4, there is facet hypertrophy with a disc bulge which creates mild central spinal stenosis and moderate left and mild right neuroforaminal stenosis.  At L4-L5 there is facet hypertrophy with disc bulging, which contributes to mild central spinal stenosis and severe bilateral neuroforaminal  stenosis.  He was experiencing symptoms extending into his left lower extremity consistent with MRI findings.  Therefore on November 22, 2023 he underwent a diagnostic and therapeutic left L3-L4 and left L4-L5 transforaminal epidural steroid injection, from which he reports experiencing almost complete resolution of his left lower extremity pain.  He does continue with lower back difficulties, as well as numbness to his right thigh.  Additionally on October 16, 2023, he underwent bilateral cervical medial branch rhizotomies, from which overall he reports experiencing almost complete pain relief and functional improvement lasting 3 months, with an ongoing 50% lasting an additional 3 months, with gradual recurrence. He called the office several months after I saw him in follow-up in 2024, reporting a recurrence of his neck and lower extremity pain.  He was reporting he began to experience bilateral lower extremity pain, versus prior was only the left.  Therefore on 11/6/2024, he underwent bilateral L4-L5 transforaminal epidural steroid injection, from which he reported experiencing 70% pain relief lasting.  Additionally, on 12/18/2024 he underwent repeat bilateral cervical medial branch rhizotomies at C4, C5 and C6, for which overall he reports experiencing pain relief and functional improvement lasting.  He did call the office there in February 2025, reporting an ongoing pain in his right lower extremity extending into his right foot.  I recommended an EMG to determine the source of his ongoing symptoms.  He underwent electrodiagnostic studies performed by Dr. Stewart on 4/11/2025, which demonstrates a mild right L5-S1 chronic radiculopathy.  Patient has failed to obtain pain relief with conservative measures including oral analgesics and physical therapy. I have reviewed all available patient's medical records as well as previous therapies as referenced above. I had a lengthy conversation with Mr. Philippe Mixon  regarding his chronic pain condition and potential therapeutic options including risks, benefits, alternative therapies, to name a few. Therefore, I have proposed the following plan:  1. Pharmacological measures: Reviewed and discussed;   A. Patient takes Celebrex.  Patient also takes methotrexate.  Patient has declined additional phonological measures.  2. Interventional pain management measures:   A. Treatment of Neck Pain: None indicated this time.  If his neck pain and headaches increase we discussed repeating bilateral cervical medial branch rhizotomies at C4, C5, C6; for bilateral cervical facet joints at C4-C5, C5-C6. Otherwise we may consider cervical epidural steroid injection by interlaminar approach.  Otherwise, we may consider diagnostic and therapeutic bilateral greater occipital nerve blocks by hydrodissection technique under ultrasound and fluoroscopic guidance if his headaches continue to be bothersome.  B. Treatment of Lower back pain: Patient will be scheduled for diagnostic and therapeutic right L4-L5 and right L5-S1 transforaminal epidural steroid injections.  If his left lower extremity pain recurs we may repeat left L3-L4 and left L4-L5 transforaminal epidural steroid injections.  We may repeat epidural depending on patient's outcome.  He also has elements of mechanical lower back pain, from which we may consider lumbar MBB's and lumbar rhizotomy.  I also did briefly discuss neuromodulation, for his chronic lumbar radiculopathy.  3. Long-term rehabilitation efforts:  A. The patient does not have a history of falls. I did complete a risk assessment for falls  B. Patient will start a comprehensive physical therapy program for upper body strengthening/posture correction, core strengthening, gait and balance training, ultrasound, ASTYM, myofascial release, cupping and dry needling   C. Start an exercise program such as yoga, water therapy and swimming  D. Contrast therapy: Apply ice-packs for 15-20  minutes, followed by heating pads for 15-20 minutes to affected area   4. The patient has been instructed to contact my office with any questions or difficulties. The patient understands the plan and agrees to proceed accordingly.    Philippe Mixon reports a pain score of 7.  Given his pain assessment as noted, treatment options were discussed and the following options were decided upon as a follow-up plan to address the patient's pain: continuation of current treatment plan for pain, home exercises and therapy, patient declined analgesics, patient not interested in pharmacological measures, referral to Physical Therapy, and use of non-medical modalities (ice, heat, stretching and/or behavior modifications).      Pain Medications              celecoxib (CeleBREX) 200 MG capsule Take 1 capsule by mouth 2 (Two) Times a Day.              Patient Care Team:  Stephanie Livingston MD as PCP - General  Michelle Stahl APRN as Nurse Practitioner (Nurse Practitioner)     No orders of the defined types were placed in this encounter.        Future Appointments   Date Time Provider Department Center   9/23/2025  8:30 AM Fernie Vega DO [facility]   12/10/2025  8:30 AM Chito Cerna MD [facility]   4/27/2026  8:00 AM Stephanie Livingston MD [facility]         [facility] Rosales     Any copied data in any portion of my note from previous notes included in the HPI, PE, MDM and/or assessment and plan has been reviewed by myself and accurate as of this date.   The 21st Century Cures Act makes medical notes like this available to patients in the interest of transparency. This is a medical document intended as peer to peer communication. It is written in medical language and may contain abbreviations or verbiage that are unfamiliar. It may appear blunt or direct. Medical documents are intended to carry relevant information, facts as evident, and the clinical opinion of the practitioner.

## 2025-07-02 ENCOUNTER — OUTSIDE FACILITY SERVICE (OUTPATIENT)
Dept: PAIN MEDICINE | Facility: CLINIC | Age: 65
End: 2025-07-02
Payer: MEDICARE

## 2025-07-02 ENCOUNTER — DOCUMENTATION (OUTPATIENT)
Dept: PAIN MEDICINE | Facility: CLINIC | Age: 65
End: 2025-07-02

## 2025-07-02 PROCEDURE — 64484 NJX AA&/STRD TFRM EPI L/S EA: CPT | Performed by: ANESTHESIOLOGY

## 2025-07-02 PROCEDURE — 64483 NJX AA&/STRD TFRM EPI L/S 1: CPT | Performed by: ANESTHESIOLOGY

## 2025-07-02 NOTE — PROGRESS NOTES
PROCEDURE DATE: 07/02/2025      PREOPERATIVE DIAGNOSES  1. Intervertebral disc disorder with radiculopathy of lumbar region   2. Lumbar radiculopathy   4. Cervical spondylosis without myelopathy   3. Bilateral occipital neuralgia   5. Myofascial pain   6. Rheumatoid arthritis     POSTOPERATIVE DIAGNOSES  1. Intervertebral disc disorder with radiculopathy of lumbar region   2. Lumbar radiculopathy   4. Cervical spondylosis without myelopathy   3. Bilateral occipital neuralgia   5. Myofascial pain   6. Rheumatoid arthritis     PROCEDURE:   Diagnostic and therapeutic right L4-L5 transforaminal epidural steroid injection  Diagnostic and therapeutic right L5-S1 transforaminal epidural steroid injection    ANESTHESIA: Local anesthesia plus IV sedation    COMPLICATIONS: None    EBL: 0    MEDICAL NECESSITY: A comprehensive evaluation, including history and physical exam and pertinent physiological and functional assessment, was performed. The patient presents with intractable pain due to the diagnoses listed above. The patient has failed to respond to conservative modalities including the impact of patient's moderate-to-severe pain contributing to significant impairment in daily activities, ADLs, and a negative impact on quality of life, as referenced under HPI. Supporting diagnostic studies of patient's chronic pain condition have been reviewed. We have discussed using a stepwise approach starting with the shortest or least intense level of treatment, care, or service as determined by the extent required to diagnose and or treat a patient's condition. The treatments proposed are consistent with the patient's medical condition and are known to be as safe and effective by current guidelines and standard of care. See OV note for additional details.     PROCEDURE SUMMARY: After explaining all the risks and benefits of the procedure, an informed consent was obtained.  The patient's surgical site was confirmed with the patient  and marked in the holding room accordingly. The patient was transferred to the procedure room and placed on the operating room table in the prone position. Time out was completed. Non-invasive monitors were applied. Administration of IV sedation was performed by a nurse, who monitored the patient's level of consciousness and physiological status. Pertinent information was reported on a sedation flow sheet, which is part of the patient's permanent medical records. Start sedation time: 08:39 AM. The patient's vital signs remained within normal limits. The lumbar spine area was prepped and draped in a sterile fashion.  Using C-arm fluoroscopic guidance, the six o'clock area of the right L4 and right L5 pedicles (targets) were identified.  The skin and tissues overlying the targets were anesthetized with five ml of 1% lidocaine. Then, 22-gauge styleted needles were advanced into the most posterior and superior portion of the right L4-L5 and right L5-S1 neural foramina without difficulties. The patient did not experience paresthesia. AP and lateral X-ray views were obtained confirming appropriate needle placement. Aspiration was negative for blood and/or CSF. One ml of Omnipaque-240 was injected per lumbar level and contrast dye was seen bathing the right L4 and right L5 nerve roots with spread into the epidural space. X-rays were obtained and scanned in the patient's chart. Two ml of 0.25% bupivacaine with 4 mg of dexamethasone were injected per lumbar level. Fluoroscopy was utilized using low dose of radiation applying collimation, pulsed mode, and shielding following ALARA recommendations.  Fluoroscopy time: 16 seconds. End sedation time: 08:42 AM. The patient tolerated the procedure well and without incident.  Upon completion of the procedure, the patient was transferred to the recovery room in stable condition. The patient was [facility] from [facility] neurologically intact and with appropriate discharge  instructions. Pain level before procedure: 9/10. Pain level after procedure: 0/10.    PLAN:   Follow-up with Michelle in 10-12 weeks.   The patient has been instructed to contact my office with any questions or difficulties. The patient understands the plan and agrees to proceed accordingly.      Please note that portions of this note were completed with a voice recognition program.         Signatures  Electronically signed by: Donny Hansen M.D.; JULY 02, 2025, 11:31 AM EST (Author)

## 2025-07-07 ENCOUNTER — TELEPHONE (OUTPATIENT)
Dept: PAIN MEDICINE | Facility: CLINIC | Age: 65
End: 2025-07-07
Payer: MEDICARE

## 2025-07-07 NOTE — TELEPHONE ENCOUNTER
FOLLOW-UP CALL AFTER PROCEDURE    I spoke with the patient regarding how they are feeling after his/her procedure with Dr. Hansen. Patient reports that they are doing well.     Philippe Mixon underwent a Intervertebral disc disorder with radiculopathy of lumbar region  on 7/2/2025 . Patient reported 100% pain relief at the time of after procedure exam with Dr. Hansen. In addition, patient experienced significant functional improvement (performing activities without experiencing pain in comparison with examination prior to the procedure that reproduced pain with those activities).     Pain level before procedure: 9/10   Pain level after procedure: 0/10    Patient reports that the pain relief lasted for multiple hours.   Today, the patient reports 50 % ongoing pain relief pain (pain level 5/10)     Patient denies side effects or complications  Patient does not have any questions or concerns at this time.    Advised patient of next follow up with MORGAN Huffman on 11/6/2025.

## 2025-07-08 RX ORDER — FOLIC ACID 1 MG/1
2000 TABLET ORAL DAILY
Qty: 90 TABLET | Refills: 1 | Status: SHIPPED | OUTPATIENT
Start: 2025-07-08

## 2025-08-07 DIAGNOSIS — M51.16 INTERVERTEBRAL DISC DISORDER WITH RADICULOPATHY OF LUMBAR REGION: ICD-10-CM
